# Patient Record
Sex: MALE | Race: WHITE | Employment: FULL TIME | URBAN - METROPOLITAN AREA
[De-identification: names, ages, dates, MRNs, and addresses within clinical notes are randomized per-mention and may not be internally consistent; named-entity substitution may affect disease eponyms.]

---

## 2017-06-27 ENCOUNTER — OFFICE VISIT (OUTPATIENT)
Dept: INTERNAL MEDICINE CLINIC | Age: 52
End: 2017-06-27

## 2017-06-27 VITALS
BODY MASS INDEX: 29.41 KG/M2 | DIASTOLIC BLOOD PRESSURE: 78 MMHG | HEIGHT: 69 IN | OXYGEN SATURATION: 97 % | SYSTOLIC BLOOD PRESSURE: 118 MMHG | TEMPERATURE: 98.2 F | HEART RATE: 69 BPM | RESPIRATION RATE: 16 BRPM | WEIGHT: 198.6 LBS

## 2017-06-27 DIAGNOSIS — R73.01 IFG (IMPAIRED FASTING GLUCOSE): ICD-10-CM

## 2017-06-27 DIAGNOSIS — I10 ESSENTIAL HYPERTENSION: ICD-10-CM

## 2017-06-27 DIAGNOSIS — N18.31 CHRONIC KIDNEY DISEASE (CKD) STAGE G3A/A3, MODERATELY DECREASED GLOMERULAR FILTRATION RATE (GFR) BETWEEN 45-59 ML/MIN/1.73 SQUARE METER AND ALBUMINURIA CREATININE RATIO GREATER THAN 300 MG/G (HCC): Primary | ICD-10-CM

## 2017-06-27 NOTE — PROGRESS NOTES
49 y.o. white male who presents for evaluation. He's here basically asking for referral to nutrition/dietician to learn about renal diet. He's been doing well, continuing on w the wt loss and staying on the exercise program.  No other complaints    Past Medical History:   Diagnosis Date    Arthritis 1/16    djd on t and l spine films YOBANI    Back pain     Bipolar disorder (HCC)     Dr. Stefany Hatch since 1984    Chronic renal insufficiency     Lithium induced, Dr. Ramiro Pickens 2007    Colon adenoma     and diverticulosis 7/15 Dr Abdirizak Aragon    Dyslipidemia     calculated 10 yr risk score was 5.9% (4/14); 5.4% (11/14); 4.3% (5/15); 6.7% (5/16); 7.3% (11/16)    Hypertension     Myofascial pain 2016    Dr Doug Travis Obesity     peak weight 219 lbs, bmi 32.3 from 11/14    OTIS on CPAP     Dr Darrius Renae Proteinuria     minimal 2011    Subacromial bursitis     right Dr. Tolu Claros     Current Outpatient Prescriptions   Medication Sig    OXcarbazepine (TRILEPTAL) 300 mg tablet Take  by mouth.  OLANZapine (ZYPREXA) 10 mg tablet Take 5 mg by mouth nightly.  losartan (COZAAR) 100 mg tablet Take 50 mg by mouth daily.  polyethylene glycol (MIRALAX) 17 gram packet Take 17 g by mouth daily.  multivitamin (ONE A DAY) tablet Take 1 Tab by mouth daily.  Cholecalciferol, Vitamin D3, (VITAMIN D) 2,000 unit Cap Take  by mouth.  lamotrigine (LAMICTAL) 100 mg tablet Take 100 mg by mouth as directed. Pt takes 100mg in the am and 200mg in the pm         No current facility-administered medications for this visit. No Known Allergies    Visit Vitals    /78 (BP 1 Location: Left arm, BP Patient Position: Sitting)    Pulse 69    Temp 98.2 °F (36.8 °C) (Oral)    Resp 16    Ht 5' 9\" (1.753 m)    Wt 198 lb 9.6 oz (90.1 kg)    SpO2 97%    BMI 29.33 kg/m2   no exam was done    Assessment and plan:  1.  Renal.  Will send to nutrition      RTC 11/17 as previously scheduled

## 2017-06-27 NOTE — PROGRESS NOTES
Chief Complaint   Patient presents with    Diet Concern    Other     referral request to nutrition/dietician     Patient stated that he would like to be referred to a dietician regarding kidney disease. 1. Have you been to the ER, urgent care clinic since your last visit? Hospitalized since your last visit? No    2. Have you seen or consulted any other health care providers outside of the [address] [place] [address] since your last visit? Include any pap smears or colon screening.  No

## 2017-06-27 NOTE — MR AVS SNAPSHOT
Visit Information Date & Time Provider Department Dept. Phone Encounter #  
 6/27/2017  8:15 AM Elton Gutierrez MD Internist of [address] [place] [address] [phone] Your Appointments 11/10/2017  8:05 AM  
LAB with [facility] NURSE VISIT Internist of [facility] ([facility]) Appt Note: labs 1yr rd  
 [address] [facility], [address] [facility] [address] [place]  
  
   
 [address], [address]  
  
    
 11/17/2017  8:00 AM  
Office Visit with Elton Gutierrez MD  
[facility] [address] [place] [address] [facility] Appt Note: ov 1year rd  
 [address] [facility], [address] [facility] [address] [place]  
  
   
 [address], [address] Upcoming Health Maintenance Date Due INFLUENZA AGE 9 TO ADULT 8/1/2017 COLONOSCOPY 6/24/2020 DTaP/Tdap/Td series (2 - Td) 11/28/2026 Allergies as of 6/27/2017  Review Complete On: 6/27/2017 By: Smita Romero LPN No Known Allergies Current Immunizations  Reviewed on 12/26/2015 Name Date Influenza Vaccine 9/19/2015, 10/31/2014, 10/30/2013 Tdap 11/28/2016 Not reviewed this visit Vitals BP Pulse Temp Resp Height(growth percentile) Weight(growth percentile) 118/78 (BP 1 Location: Left arm, BP Patient Position: Sitting) 69 98.2 °F (36.8 °C) (Oral) 16 5' 9\" (1.753 m) 198 lb 9.6 oz (90.1 kg) SpO2 BMI Smoking Status 97% 29.33 kg/m2 Never Smoker BMI and BSA Data Body Mass Index Body Surface Area  
 29.33 kg/m 2 2.09 m 2 Preferred Pharmacy Pharmacy Name Phone [facility] [address] [facility], [address] [facility] [phone] Your Updated Medication List  
  
   
This list is accurate as of: 6/27/17  8:33 AM.  Always use your most recent med list.  
  
  
  
  
 LaMICtal 100 mg tablet Generic drug:  lamoTRIgine Take 100 mg by mouth as directed. Pt takes 100mg in the am and 200mg in the pm  
  
 losartan 100 mg tablet Commonly known as:  COZAAR Take 50 mg by mouth daily. MIRALAX 17 gram packet Generic drug:  polyethylene glycol Take 17 g by mouth daily. multivitamin tablet Commonly known as:  ONE A DAY Take 1 Tab by mouth daily. TRILEPTAL 300 mg tablet Generic drug:  OXcarbazepine Take  by mouth. VITAMIN D 2,000 unit Cap capsule Generic drug:  Cholecalciferol (Vitamin D3) Take  by mouth. ZyPREXA 10 mg tablet Generic drug:  OLANZapine Take 5 mg by mouth nightly. Introducing [facility] & HEALTH SERVICES! Dear [facility] Staff: Thank you for requesting a [employer] account. Our records indicate that you already have an active Ob[employer] account. You can access your account anytime at [email or web address] Did you know that you can access your hospital and ER discharge instructions at any time in Obatech? You can also review all of your test results from your hospital stay or ER visit. Additional Information If you have questions, please visit the Frequently Asked Questions section of the [employer] website at [email or web address] Remember, Ob[employer] is NOT to be used for urgent needs. For medical emergencies, dial 911. Now available from your iPhone and Android! Please provide this summary of care documentation to your next provider. Your primary care clinician is listed as Paras Bean. If you have any questions after today's visit, please call [phone].

## 2017-08-10 ENCOUNTER — OFFICE VISIT (OUTPATIENT)
Dept: INTERNAL MEDICINE CLINIC | Age: 52
End: 2017-08-10

## 2017-08-10 VITALS
HEART RATE: 74 BPM | WEIGHT: 200 LBS | RESPIRATION RATE: 14 BRPM | DIASTOLIC BLOOD PRESSURE: 82 MMHG | HEIGHT: 69 IN | OXYGEN SATURATION: 98 % | TEMPERATURE: 98.1 F | SYSTOLIC BLOOD PRESSURE: 122 MMHG | BODY MASS INDEX: 29.62 KG/M2

## 2017-08-10 DIAGNOSIS — N52.9 ERECTILE DYSFUNCTION, UNSPECIFIED ERECTILE DYSFUNCTION TYPE: Primary | ICD-10-CM

## 2017-08-10 RX ORDER — SILDENAFIL 100 MG/1
100 TABLET, FILM COATED ORAL AS NEEDED
Qty: 10 TAB | Refills: 11 | Status: ON HOLD | OUTPATIENT
Start: 2017-08-10 | End: 2021-10-25

## 2017-08-10 NOTE — PROGRESS NOTES
1. Have you been to the ER, urgent care clinic or hospitalized since your last visit? NO.     2. Have you seen or consulted any other health care providers outside of the [facility] since your last visit (Include any pap smears or colon screening)? YES    Kidney specialist.   Stephany Lee     Do you have an Advanced Directive? NO    Would you like information on Advanced Directives?  YES

## 2017-08-10 NOTE — PROGRESS NOTES
47 y.o. white male who presents for evaluation. He has not had sexual relations with anyone for a while until he met someone about a month ago. When they tried to have sex, he had erections which quickly dissipated. No problems when he masturbated prior to meeting her, he has morning erections periodically still. No interpersonal issues w his current partner although he admits to concern regarding her having genital herpes. She apparently takes meds to suppress breakouts. No new meds, stress levels about the same    Past Medical History:   Diagnosis Date    Arthritis 1/16    djd on t and l spine films YOBANI    Back pain     Bipolar disorder (HCC)     Dr. Flex Mcleod since 1984    Chronic renal insufficiency     Lithium induced, Dr. Kyle Nolasco 2007    Colon adenoma     and diverticulosis 7/15 Dr Ray Morin    Dyslipidemia     calculated 10 yr risk score was 5.9% (4/14); 5.4% (11/14); 4.3% (5/15); 6.7% (5/16); 7.3% (11/16)    Erectile dysfunction 8/10/2017    Hypertension     Myofascial pain 2016    Dr Elisha Rubin Obesity     peak weight 219 lbs, bmi 32.3 from 11/14    OITS on CPAP     Dr Tiffanie Starr Proteinuria     minimal 2011    Subacromial bursitis     right Dr. Pratik Swanson     Current Outpatient Prescriptions   Medication Sig    sildenafil citrate (VIAGRA) 100 mg tablet Take 1 Tab by mouth as needed.  OXcarbazepine (TRILEPTAL) 300 mg tablet Take  by mouth.  OLANZapine (ZYPREXA) 10 mg tablet Take 10 mg by mouth nightly.  losartan (COZAAR) 100 mg tablet Take 50 mg by mouth daily.  polyethylene glycol (MIRALAX) 17 gram packet Take 17 g by mouth daily.  multivitamin (ONE A DAY) tablet Take 1 Tab by mouth daily.  Cholecalciferol, Vitamin D3, (VITAMIN D) 2,000 unit Cap Take  by mouth.  lamotrigine (LAMICTAL) 100 mg tablet Take 100 mg by mouth as directed. Pt takes 100mg in the am and 200mg in the pm         No current facility-administered medications for this visit.       No Known Allergies Visit Vitals    /82 (BP 1 Location: Right arm, BP Patient Position: Sitting)    Pulse 74    Temp 98.1 °F (36.7 °C) (Oral)    Resp 14    Ht 5' 9\" (1.753 m)    Wt 200 lb (90.7 kg)    SpO2 98%    BMI 29.53 kg/m2   normal male genitalia, normal cremasteric reflex    Assessment and plan:  1. ED. Long discussion. Trial of viagra after discussing possible sfx, consider cialis if it works and no sfx, we briefly talked about benefits of [facility] pharmacies also. Quick discussion about avoidance measures for hsv exposure      Above conditions discussed at length and patient vocalized understanding.   All questions answered to patient satisfaction

## 2017-08-10 NOTE — MR AVS SNAPSHOT
Visit Information Date & Time Provider Department Dept. Phone Encounter #  
 8/10/2017  8:15 AM Jarrett Ramirez MD Internist of [address] [place] [address] [phone] [id number] Your Appointments 11/10/2017  8:05 AM  
LAB with [facility] NURSE VISIT Internist of [facility] ([facility]) Appt Note: labs 1yr rd  
 [address] [facility], [address] [facility] [address] [place]  
  
   
 [address], [address]  
  
    
 11/17/2017  8:00 AM  
Office Visit with Jarrett Ramirez MD  
Internist of [address] [place] [address] [facility] Appt Note: ov 1year rd  
 [address] [facility], [address] [address] [address] [place]  
  
   
 [address], [address] Upcoming Health Maintenance Date Due INFLUENZA AGE 9 TO ADULT 8/1/2017 COLONOSCOPY 6/24/2020 DTaP/Tdap/Td series (2 - Td) 11/28/2026 Allergies as of 8/10/2017  Review Complete On: 8/10/2017 By: Robert Magana No Known Allergies Current Immunizations  Reviewed on 12/26/2015 Name Date Influenza Vaccine 9/19/2015, 10/31/2014, 10/30/2013 Tdap 11/28/2016 Not reviewed this visit Vitals BP Pulse Temp Resp Height(growth percentile) Weight(growth percentile) 122/82 (BP 1 Location: Right arm, BP Patient Position: Sitting) 74 98.1 °F (36.7 °C) (Oral) 14 5' 9\" (1.753 m) 200 lb (90.7 kg) SpO2 BMI Smoking Status 98% 29.53 kg/m2 Never Smoker Vitals History BMI and BSA Data Body Mass Index Body Surface Area  
 29.53 kg/m 2 2.1 m 2 Preferred Pharmacy Pharmacy Name Phone [facility] [address] [facility], [address] [phone] Your Updated Medication List  
  
   
This list is accurate as of: 8/10/17  9:02 AM.  Always use your most recent med list.  
  
  
  
  
 LaMICtal 100 mg tablet Generic drug:  lamoTRIgine Take 100 mg by mouth as directed. Pt takes 100mg in the am and 200mg in the pm  
  
 losartan 100 mg tablet Commonly known as:  COZAAR Take 50 mg by mouth daily. MIRALAX 17 gram packet Generic drug:  polyethylene glycol Take 17 g by mouth daily. multivitamin tablet Commonly known as:  ONE A DAY Take 1 Tab by mouth daily. TRILEPTAL 300 mg tablet Generic drug:  OXcarbazepine Take  by mouth. VITAMIN D 2,000 unit Cap capsule Generic drug:  Cholecalciferol (Vitamin D3) Take  by mouth. ZyPREXA 10 mg tablet Generic drug:  OLANZapine Take 10 mg by mouth nightly. To-Do List   
 08/30/2017 9:00 AM  
  Appointment with Robel Melgar RD at [facility] Patient Instructions Advance Directives: Care Instructions Your Care Instructions An advance directive is a legal way to state your wishes at the end of your life. It tells your family and your doctor what to do if you can no longer say what you want. There are two main types of advance directives. You can change them any time that your wishes change. · A living will tells your family and your doctor your wishes about life support and other treatment. · A durable power of  for health care lets you name a person to make treatment decisions for you when you can't speak for yourself. This person is called a health care agent. If you do not have an advance directive, decisions about your medical care may be made by a doctor or a  who doesn't know you. It may help to think of an advance directive as a gift to the people who care for you. If you have one, they won't have to make tough decisions by themselves. Follow-up care is a key part of your treatment and safety. Be sure to make and go to all appointments, and call your doctor if you are having problems. It's also a good idea to know your test results and keep a list of the medicines you take. How can you care for yourself at home? · Discuss your wishes with your loved ones and your doctor. This way, there are no surprises. · Many states have a unique form. Or you might use a universal form that has been approved by many states. This kind of form can sometimes be completed and stored online. Your electronic copy will then be available wherever you have a connection to the Internet. In most cases, doctors will respect your wishes even if you have a form from a different state. · You don't need a  to do an advance directive. But you may want to get legal advice. · Think about these questions when you prepare an advance directive: ¨ Who do you want to make decisions about your medical care if you are not able to? Many people choose a family member or close friend. ¨ Do you know enough about life support methods that might be used? If not, talk to your doctor so you understand. ¨ What are you most afraid of that might happen? You might be afraid of having pain, losing your independence, or being kept alive by machines. ¨ Where would you prefer to die? Choices include your home, a hospital, or a nursing home. ¨ Would you like to have information about hospice care to support you and your family? ¨ Do you want to donate organs when you die? ¨ Do you want certain Hoahaoism practices performed before you die? If so, put your wishes in the advance directive. · Read your advance directive every year, and make changes as needed. When should you call for help? Be sure to contact your doctor if you have any questions. Where can you learn more? Go to http://emma-jj.info/. Enter R264 in the search box to learn more about \"Advance Directives: Care Instructions. \" Current as of: November 17, 2016 Content Version: 11.3 © 9016-8475 Spondo.  Care instructions adapted under license by [employer] (which disclaims liability or warranty for this information). If you have questions about a medical condition or this instruction, always ask your healthcare professional. [address] any warranty or liability for your use of this information. Introducing [facility] & HEALTH SERVICES! Dear Sg Gonzalez: Thank you for requesting a [employer] account. Our records indicate that you already have an active [employer] account. You can access your account anytime at [email or web address] Did you know that you can access your hospital and ER discharge instructions at any time in [employer]? You can also review all of your test results from your hospital stay or ER visit. Additional Information If you have questions, please visit the Frequently Asked Questions section of the [employer] website at [email or web address] Remember, [employer] is NOT to be used for urgent needs. For medical emergencies, dial 911. Now available from your iPhone and Android! Please provide this summary of care documentation to your next provider. Your primary care clinician is listed as Sudhakar Nieto. If you have any questions after today's visit, please call [phone].

## 2017-08-10 NOTE — PATIENT INSTRUCTIONS
Advance Directives: Care Instructions  Your Care Instructions  An advance directive is a legal way to state your wishes at the end of your life. It tells your family and your doctor what to do if you can no longer say what you want. There are two main types of advance directives. You can change them any time that your wishes change. · A living will tells your family and your doctor your wishes about life support and other treatment. · A durable power of  for health care lets you name a person to make treatment decisions for you when you can't speak for yourself. This person is called a health care agent. If you do not have an advance directive, decisions about your medical care may be made by a doctor or a  who doesn't know you. It may help to think of an advance directive as a gift to the people who care for you. If you have one, they won't have to make tough decisions by themselves. Follow-up care is a key part of your treatment and safety. Be sure to make and go to all appointments, and call your doctor if you are having problems. It's also a good idea to know your test results and keep a list of the medicines you take. How can you care for yourself at home? · Discuss your wishes with your loved ones and your doctor. This way, there are no surprises. · Many states have a unique form. Or you might use a universal form that has been approved by many states. This kind of form can sometimes be completed and stored online. Your electronic copy will then be available wherever you have a connection to the Internet. In most cases, doctors will respect your wishes even if you have a form from a different state. · You don't need a  to do an advance directive. But you may want to get legal advice. · Think about these questions when you prepare an advance directive:  ¨ Who do you want to make decisions about your medical care if you are not able to?  Many people choose a family member or close friend. ¨ Do you know enough about life support methods that might be used? If not, talk to your doctor so you understand. ¨ What are you most afraid of that might happen? You might be afraid of having pain, losing your independence, or being kept alive by machines. ¨ Where would you prefer to die? Choices include your home, a hospital, or a nursing home. ¨ Would you like to have information about hospice care to support you and your family? ¨ Do you want to donate organs when you die? ¨ Do you want certain Quaker practices performed before you die? If so, put your wishes in the advance directive. · Read your advance directive every year, and make changes as needed. When should you call for help? Be sure to contact your doctor if you have any questions. Where can you learn more? Go to http://emma-jj.info/. Enter R264 in the search box to learn more about \"Advance Directives: Care Instructions. \"  Current as of: November 17, 2016  Content Version: 11.3  © 4170-0478 Healthwise, Incorporated. Care instructions adapted under license by [employer] (which disclaims liability or warranty for this information). If you have questions about a medical condition or this instruction, always ask your healthcare professional. [address] any warranty or liability for your use of this information.

## 2017-09-28 ENCOUNTER — HOSPITAL ENCOUNTER (OUTPATIENT)
Dept: NUTRITION | Age: 52
Discharge: HOME | End: 2017-09-28
Payer: COMMERCIAL

## 2017-09-28 PROCEDURE — 97802 MEDICAL NUTRITION INDIV IN: CPT

## 2017-09-28 NOTE — PROGRESS NOTES
[address] [facility]  [address], [place], [address] - Phone: [phone]     Nutrition Assessment  Medical Nutrition Therapy   Outpatient  Initial Evaluation         Patient Name: Selwyn Vale : 1965   Treatment Diagnosis: Impaired Fasting Glucose; Stage 3B CKD Onset Date:    Referral Source: Ramirez Dixon MD Start of Care [facility]): 2017     Ht:  69 in  cm Wt: 201  lb  kg   BMI: 29.7  BMR male    BMR female      Diagnosis:         Medications of Nutritional Significance:   See med list in chart     Subjective/Assessment: Pt is a 48 yo male who is seeking education for blood sugar control. Pt also has stage 3B CKD. His last A1C was 5.1 Last GFR was 34, Cr: 2.15H, Pt has not been put on any dietary restrictions per doctor or renal disease. Did review high and low potassium foods as well as focuse don lower protein consumption (not exceeding 0.7gm per pound of body wt). Pt works FT as a . He lives alone and eats out /cooks for himself ~50%/50% of the time. He does have a girlfriend which discussed meeting up for outings not envolving food if already eaten dinner. Educated pt on insulin resistance and the rationale for dietary modification and increased activity. Discussed meal timing (follow a timeline), composition, and portion control. Discussed the plate method and how to better balance meals and snacks. Eat 3 meals + 1-2 optional high protein low-no carb snacks each day. (within carbohydrate limits 30-45 gm per meal and 10-15 gm per snack). Discussed the importance of drinking water and Sugar Free beverages only. Complete at least 120 mins of physical activity each week, divided as schedule permits. Pt expressed comprehension, high motivation, and compliance is expected.        Current Eating Patterns:  B: Skips or Donut, coffee w/ splenda  L: lean cuisine, pecan chicken and rice  S: m&Ms or crackers  D: Pasta w/ meat and marinara sauce  Pt drinks 4 bottles of water per day, 3-4 cups coff, non-alcoholis beer, ~1 beer per month, 2-3 diet sodas per day     Handouts/  Information Provided: [x]  Carbohydrates  [x]  Protein  []  Fiber  [x]  Serving Sizes  [x]  Meal and Snack Ideas  [x]  Food Journals [x]  Diabetes  []  Cholesterol  []  Sodium  [x]  Gen Nutr Guidelines  []  SBGM Guidelines  [x]  Others: Paul   Estimate Needs:   Calories: 1800 Protein: 153 Carbs: 162 Fat: 60   Kcal/day  g/day  g/day  g/day         percent: 34 Percent: 36 percent: 30     Nutritional Goal - To promote lifestyle changes to result in:    [x]  weight loss  [x]  Improved diabetic control  []  Decreased cholesterol levels  []  Decreased blood pressure  []  weight maintenance []  Preventing any interactions associated with food allergies  []  Adequate weight gain toward goal weight  []  Other:          Recommendations: 1. Follow consistent and appropriate meal timing; follow a structured timeline  2. Focus on good protein sources; Never eat carbs alone, always pair them with protein,   Carb Limits:   3. Use the plate method for portion contol and balance  4. Exercise for a minimum of 30 min 3-4 times per week, advance to daily.      Information Reviewed with: Patient   Potential Barriers to Learning: None     Dietitian Signature: Suki Burrell MA RD Date: 9/28/2017   Follow-up: 8 NOV @ 8540 Time: 12:43 PM

## 2017-11-10 ENCOUNTER — LAB ONLY (OUTPATIENT)
Dept: INTERNAL MEDICINE CLINIC | Age: 52
End: 2017-11-10

## 2017-11-10 DIAGNOSIS — E78.5 DYSLIPIDEMIA: ICD-10-CM

## 2017-11-10 DIAGNOSIS — I10 ESSENTIAL HYPERTENSION: ICD-10-CM

## 2017-11-10 DIAGNOSIS — R73.01 IFG (IMPAIRED FASTING GLUCOSE): Primary | ICD-10-CM

## 2017-11-15 NOTE — PROGRESS NOTES
47 y.o. white male who presents for RPE    No cardiovascular complaints. He works out doing light toning once a week on average, more motivational than anything else. He did speak with the nutritionist earlier this year and has been trying to stick with the diet. He has largely maintained the wt loss from a year ago     Vitals 11/17/2017 8/10/2017 6/27/2017 11/28/2016 5/26/2016   Weight 209 lb 3.2 oz 200 lb 198 lb 9.6 oz 205 lb 207 lb     No gi or gu complaints. He never ended up using the viagra that we gave, it was costing $75 a pill!     No new psych issues, continues to see Dr Byron Staley sees him yearly    Remains on cpap and doing wel    Past Medical History:   Diagnosis Date    Arthritis 1/16    djd on t and l spine films YOBANI    Back pain     Bipolar disorder ([facility] [address]     Dr. Byron Valero since 1984    Chronic renal insufficiency     Lithium induced, Dr. Esmer Staley 2007    Colon adenoma     and diverticulosis 7/15 Dr Annamaria Cevallos    Dyslipidemia     calculated 10 yr risk score was 5.9% (4/14); 5.4% (11/14); 4.3% (5/15); 6.7% (5/16); 7.3% (11/16)    Erectile dysfunction 8/10/2017    Hypertension     Myofascial pain 2016    Dr Chilango Ferguson    Obesity     peak weight 219 lbs, bmi 32.3 from 11/14    OTIS on CPAP     Dr Rajni Whitehead Proteinuria     minimal 2011    Subacromial bursitis     right Dr. Truong Cordova     Past Surgical History:   Procedure Laterality Date    CARDIAC SURG PROCEDURE UNLIST      ETT negative 2004, 2009    HX COLONOSCOPY      Dr Ananmaria Cevallos 7/15 adenoma and divertics     Social History     Social History    Marital status: UNKNOWN     Spouse name: N/A    Number of children: 1    Years of education: N/A     Occupational History    [address]      Social History Main Topics    Smoking status: Never Smoker    Smokeless tobacco: Never Used    Alcohol use Yes      Comment: social    Drug use: No    Sexual activity: Not on file     Other Topics Concern    Not on file     Social History Narrative     Current Outpatient Prescriptions   Medication Sig    OXcarbazepine (TRILEPTAL) 300 mg tablet Take  by mouth.  OLANZapine (ZYPREXA) 10 mg tablet Take 10 mg by mouth nightly.  losartan (COZAAR) 100 mg tablet Take 50 mg by mouth daily.  polyethylene glycol (MIRALAX) 17 gram packet Take 17 g by mouth daily.  multivitamin (ONE A DAY) tablet Take 1 Tab by mouth daily.  Cholecalciferol, Vitamin D3, (VITAMIN D) 2,000 unit Cap Take  by mouth.  lamotrigine (LAMICTAL) 100 mg tablet Take 100 mg by mouth as directed. Pt takes 100mg in the am and 200mg in the pm        sildenafil citrate (VIAGRA) 100 mg tablet Take 1 Tab by mouth as needed. No current facility-administered medications for this visit. No Known Allergies    REVIEW OF SYSTEMS: colo 2015 Dr Michelle Plata  Ophtho  no vision change or eye pain  Oral  no mouth pain, tongue or tooth problems  Ears  no hearing loss, ear pain, fullness, no swallowing problems  Cardiac  no CP, PND, orthopnea, edema, palpitations or syncope  Chest  no breast masses  Resp  no wheezing, chronic coughing, dyspnea  GI  no heartburn, nausea, vomiting, change in bowel habits, bleeding, hemorrhoids  Urinary  no dysuria, hematuria, flank pain, urgency, frequency  Constitutional  no wt loss, night sweats, unexplained fevers  Neuro  no focal weakness, numbness, paresthesias, incoordination, ataxia, involuntary movements  Endo - no polyuria, polydipsia, nocturia, hot flashes    Visit Vitals    /80 (BP 1 Location: Left arm, BP Patient Position: Sitting)    Pulse 71    Temp 98.1 °F (36.7 °C) (Oral)    Resp 14    Ht 5' 9\" (1.753 m)    Wt 209 lb 3.2 oz (94.9 kg)    SpO2 98%    BMI 30.89 kg/m2   A&O x3  Affect is appropriate. Mood stable  No apparent distress  Anicteric, no JVD, adenopathy or thyromegaly. No carotid bruits or radiated murmur  Lungs clear to auscultation, no wheezes or rales  Heart showed regular rate and rhythm.  No murmur, rubs, gallops  Abdomen soft nontender, no hepatosplenomegaly or masses. Rectal showed guaiac neg brown stool, normal tone  Prostate without asymmetry, nodularity, tenderness  Extremities without edema. Pulses 1-2+ symmetrically    LABS  From 11/09 showed gluc 102, cr 1.80, gfr 44,          chol 215, tg 350, hdl 34, ldl-c 111  From 1/10 showed       alt 30,         chol 200, tg 240, hdl 33, ldl-c 119  From 6/11 showed   gluc 86,   cr 2.30,    alt 17,         chol 198, tg 134, hdl 38, ldl-c 133, wbc 2.8, hb 12.4, plt 190,        psa 0.4  From 12/11 showed gluc 95,   cr 2.01, gfr 39,   ldl-p 1438, chol 201, tg 144, hdl 48, ldl-c 124,            24 hr U pro 474  From 6/12 showed       ldl-p 1611, chol 228, tg 231, hdl 39, ldl-c 143  From 5/14 showed   gluc 96,   cr 1.95, gfr 37, alt 13,         chol 242, tg 450, hdl 32, ldl-c na,   wbc 6.1, hb 13.2, plt 229, ua 1+ pro, tsh 1.83, psa 0.3, vit d 34.7  From 11/14 showed              chol 250, tg 340, hdl 35, ldl-c 147  From 5/15 showed   gluc 97,   cr 2.29, gfr 31,   hba1c 5.7, chol 215, tg 255, hdl 36, ldl-c 128  From 11/15 showed gluc 104, cr 2.14, gfr 35,   hba1c 5.6,            tsh 1.62  From 5/16 showed   gluc 92,   cr 2.11, gfr 34,          chol 252, tg 438, hdl 35, ldl-c na,   wbc 5.2, hb 13.6, plt 233  From 11/16 showed       hba1c 5.1, chol 254, tg 321, hdl 39, ldl-c 151    Labs pending    Patient Active Problem List   Diagnosis Code    Bipolar disorder Dr. Star Sherman F31.9    Dyslipidemia E78.5    Primary hypertension I10    OTIS on CPAP Dr Caroline Moss G47.33, Z99.89    Colon adenoma 7/15 Dr Vania Viramontes D12.6    IFG (impaired fasting glucose) R73.01    Obesity (BMI 30-39. 9) E66.9    Chronic kidney disease (CKD) Lesley Bal Dr Kathrin Schirmer N18.3    Erectile dysfunction N52.9     Assessment and plan:  1. BPD. Continue current and f/u Dr. Russ Isabel  2. CRI and mild proteinuria. Continue cozaar and f/u Dr. Kathrin Schirmer  3. Dyslipidemia.   Previously gave him the [employer] sheet, inc exercise as above  4. HTN. On ARB  5. Obesity. Dietary nad lifestyle measures. Brief discussion on demetrio supp, not interested due to concerns about sfx  6. Prediabetes. As per #5  7. OTIS on cpap. F/U Dr Lali Urbina  8. Colon adenoma. Fiber, colo 2020  9. Flu given, pvx in future        RPE 11/18    Above conditions discussed at length and patient vocalized understanding.   All questions answered to patient satifaction

## 2017-11-17 ENCOUNTER — OFFICE VISIT (OUTPATIENT)
Dept: INTERNAL MEDICINE CLINIC | Age: 52
End: 2017-11-17

## 2017-11-17 VITALS
BODY MASS INDEX: 30.98 KG/M2 | OXYGEN SATURATION: 98 % | SYSTOLIC BLOOD PRESSURE: 108 MMHG | WEIGHT: 209.2 LBS | DIASTOLIC BLOOD PRESSURE: 80 MMHG | HEART RATE: 71 BPM | HEIGHT: 69 IN | TEMPERATURE: 98.1 F | RESPIRATION RATE: 14 BRPM

## 2017-11-17 DIAGNOSIS — G47.33 OSA ON CPAP: ICD-10-CM

## 2017-11-17 DIAGNOSIS — Z00.00 PHYSICAL EXAM: Primary | ICD-10-CM

## 2017-11-17 DIAGNOSIS — E78.5 DYSLIPIDEMIA: ICD-10-CM

## 2017-11-17 DIAGNOSIS — N52.9 ERECTILE DYSFUNCTION, UNSPECIFIED ERECTILE DYSFUNCTION TYPE: ICD-10-CM

## 2017-11-17 DIAGNOSIS — F31.78 BIPOLAR DISORDER, IN FULL REMISSION, MOST RECENT EPISODE MIXED (HCC): ICD-10-CM

## 2017-11-17 DIAGNOSIS — D12.6 COLON ADENOMA: ICD-10-CM

## 2017-11-17 DIAGNOSIS — R73.01 IFG (IMPAIRED FASTING GLUCOSE): ICD-10-CM

## 2017-11-17 DIAGNOSIS — E66.9 OBESITY (BMI 30-39.9): ICD-10-CM

## 2017-11-17 DIAGNOSIS — Z23 ENCOUNTER FOR IMMUNIZATION: ICD-10-CM

## 2017-11-17 DIAGNOSIS — Z99.89 OSA ON CPAP: ICD-10-CM

## 2017-11-17 DIAGNOSIS — N18.31 CHRONIC KIDNEY DISEASE (CKD) STAGE G3A/A3, MODERATELY DECREASED GLOMERULAR FILTRATION RATE (GFR) BETWEEN 45-59 ML/MIN/1.73 SQUARE METER AND ALBUMINURIA CREATININE RATIO GREATER THAN 300 MG/G (HCC): ICD-10-CM

## 2017-11-17 DIAGNOSIS — I10 PRIMARY HYPERTENSION: ICD-10-CM

## 2017-11-17 NOTE — PROGRESS NOTES
1. Have you been to the ER, urgent care clinic or hospitalized since your last visit? NO.     2. Have you seen or consulted any other health care providers outside of the [facility] since your last visit (Include any pap smears or colon screening)? NO      Do you have an Advanced Directive? NO    Would you like information on Advanced Directives?  NO

## 2017-11-17 NOTE — PROGRESS NOTES
VO from Dr. Jerez Memory for Regular Influenza. Regular Influenza given in Left Deltoid. No pain or reactions noted at injection site.

## 2017-11-17 NOTE — MR AVS SNAPSHOT
Visit Information Date & Time Provider Department Dept. Phone Encounter #  
 11/17/2017  8:00 AM Blanco Mas MD [facility]  Follow-up Instructions Return in about 1 year (around 11/17/2018). Your Appointments 11/6/2018  7:55 AM  
LAB with [facility] NURSE VISIT [facility] ([address]) Appt Note: labs 1yr rd  
 [address] [facility], [address] [place] [address] [facility] [address] [place]  
  
   
 [address], [facility]  
  
    
 11/20/2018  8:20 AM  
PHYSICAL with Blanco Mas MD  
[facility] [address] [facility]) Appt Note: ov 1yr rd; ov rpe 1yr  rd  
 [address] [facility], [address] [facility] [address] [place]  
  
   
 [address], [facility] Upcoming Health Maintenance Date Due Influenza Age 5 to Adult 8/1/2017 COLONOSCOPY 6/24/2020 DTaP/Tdap/Td series (2 - Td) 11/28/2026 Allergies as of 11/17/2017  Review Complete On: 11/17/2017 By: Blanco Mas MD  
 No Known Allergies Current Immunizations  Reviewed on 11/17/2017 Name Date Influenza Vaccine 9/19/2015, 10/31/2014, 10/30/2013 Influenza Vaccine (Quad) PF 11/17/2017  8:44 AM  
 Tdap 11/28/2016 Reviewed by Blanco Mas MD on 11/17/2017 at  8:46 AM  
You Were Diagnosed With   
  
 Codes Comments Physical exam    -  Primary ICD-10-CM: Z00.00 ICD-9-CM: V70.9 Bipolar disorder, in full remission, most recent episode mixed [facility])     ICD-10-CM: F31.78 
ICD-9-CM: 296.66 Dyslipidemia     ICD-10-CM: E78.5 ICD-9-CM: 272.4 Primary hypertension     ICD-10-CM: I10 
ICD-9-CM: 401.9 OTIS on CPAP     ICD-10-CM: G47.33, Z99.89 ICD-9-CM: 327.23, V46.8 Colon adenoma     ICD-10-CM: D12.6 ICD-9-CM: 211.3 IFG (impaired fasting glucose)     ICD-10-CM: R73.01 
ICD-9-CM: 790.21 Obesity (BMI 30-39. 9)     ICD-10-CM: E66.9 ICD-9-CM: 278.00 Chronic kidney disease (CKD) stage G3a/A3, moderately decreased glomerular filtration rate (GFR) between 45-59 mL/min/1.73 square meter and albuminuria creatinine ratio greater than 300 mg/g     ICD-10-CM: N18.3 ICD-9-CM: 278. 3 Erectile dysfunction, unspecified erectile dysfunction type     ICD-10-CM: N52.9 ICD-9-CM: 607.84 Encounter for immunization     ICD-10-CM: L09 ICD-9-CM: V03.89 Vitals BP Pulse Temp Resp Height(growth percentile) Weight(growth percentile) 108/80 (BP 1 Location: Left arm, BP Patient Position: Sitting) 71 98.1 °F (36.7 °C) (Oral) 14 5' 9\" (1.753 m) 209 lb 3.2 oz (94.9 kg) SpO2 BMI Smoking Status 98% 30.89 kg/m2 Never Smoker Vitals History BMI and BSA Data Body Mass Index Body Surface Area  
 30.89 kg/m 2 2.15 m 2 Preferred Pharmacy Pharmacy Name Phone [facility], [address] [place] [address] [phone] Your Updated Medication List  
  
   
This list is accurate as of: 11/17/17  8:51 AM.  Always use your most recent med list.  
  
  
  
  
 LaMICtal 100 mg tablet Generic drug:  lamoTRIgine Take 100 mg by mouth as directed. Pt takes 100mg in the am and 200mg in the pm  
  
 losartan 100 mg tablet Commonly known as:  COZAAR Take 50 mg by mouth daily. MIRALAX 17 gram packet Generic drug:  polyethylene glycol Take 17 g by mouth daily. multivitamin tablet Commonly known as:  ONE A DAY Take 1 Tab by mouth daily. sildenafil citrate 100 mg tablet Commonly known as:  VIAGRA Take 1 Tab by mouth as needed. TRILEPTAL 300 mg tablet Generic drug:  OXcarbazepine Take  by mouth. VITAMIN D 2,000 unit Cap capsule Generic drug:  Cholecalciferol (Vitamin D3) Take  by mouth. ZyPREXA 10 mg tablet Generic drug:  OLANZapine Take 10 mg by mouth nightly. We Performed the Following INFLUENZA VIRUS VAC QUAD,SPLIT,PRESV FREE SYRINGE IM [id number] CPT(R)] Follow-up Instructions Return in about 1 year (around 11/17/2018). To-Do List   
 05/17/2018 Lab:  CBC W/O DIFF   
  
 05/17/2018 Lab:  LIPID PANEL   
  
 05/17/2018 Lab:  METABOLIC PANEL, COMPREHENSIVE   
  
 05/17/2018 Lab:  PSA, DIAGNOSTIC (PROSTATE SPECIFIC AG)   
  
 05/19/2018 Lab:  HEMOGLOBIN A1C W/O EAG Introducing \[facility] [place] [facility]\"" & [facility]! Dear Kwan Alexander: Thank you for requesting a Globial account. Our records indicate that you already have an active Globial account. You can access your account anytime at [email or web address] Did you know that you can access your hospital and ER discharge instructions at any time in Globial? You can also review all of your test results from your hospital stay or ER visit. Additional Information If you have questions, please visit the Frequently Asked Questions section of the Globial website at [email or web address] Remember, Globial is NOT to be used for urgent needs. For medical emergencies, dial 911. Now available from your iPhone and Android! Please provide this summary of care documentation to your next provider. Your primary care clinician is listed as Drinda Epley. If you have any questions after today's visit, please call [phone].

## 2017-11-21 ENCOUNTER — TELEPHONE (OUTPATIENT)
Dept: INTERNAL MEDICINE CLINIC | Age: 52
End: 2017-11-21

## 2017-11-21 NOTE — TELEPHONE ENCOUNTER
Called and spoke with patient and given all results below. Medical records, please mail patient a copy of his recent labs. He has [employer] and it doesn't look like they have been scanned in yet, but Dr. Kristel Almazan had to have them in order to send this message.

## 2017-11-21 NOTE — PROGRESS NOTES
Addendum:    From 11/17 showed gluc 90, cr 2.31, gfr 32, alt 24, chol 278, tg 342, hdl 38, ldl-c 182, wbc 6.3, hb 13.6, plt 253      Chol MUCH worse - diet/exercise  Glu ok  Cr stable, cmp and cbc normal    pls call results

## 2017-11-21 NOTE — TELEPHONE ENCOUNTER
Blanca Moore MD at 11/17/17 0800        Status: Signed            Addendum:     From 11/17 showed gluc 90, cr 2.31, gfr 32, alt 24, chol 278, tg 342, hdl 38, ldl-c 182, wbc 6.3, hb 13.6, plt 253        Chol MUCH worse - diet/exercise  Glu ok  Cr stable, cmp and cbc normal     pls call results

## 2018-11-06 ENCOUNTER — APPOINTMENT (OUTPATIENT)
Dept: INTERNAL MEDICINE CLINIC | Age: 53
End: 2018-11-06

## 2018-11-06 ENCOUNTER — HOSPITAL ENCOUNTER (OUTPATIENT)
Dept: LAB | Age: 53
Discharge: HOME | End: 2018-11-06
Payer: COMMERCIAL

## 2018-11-06 DIAGNOSIS — Z00.00 PHYSICAL EXAM: ICD-10-CM

## 2018-11-06 LAB
ALBUMIN SERPL-MCNC: 3.8 G/DL (ref 3.4–5)
ALBUMIN/GLOB SERPL: 1.4 {RATIO} (ref 0.8–1.7)
ALP SERPL-CCNC: 130 U/L (ref 45–117)
ALT SERPL-CCNC: 36 U/L (ref 16–61)
ANION GAP SERPL CALC-SCNC: 7 MMOL/L (ref 3–18)
AST SERPL-CCNC: 18 U/L (ref 15–37)
BILIRUB SERPL-MCNC: 0.4 MG/DL (ref 0.2–1)
BUN SERPL-MCNC: 28 MG/DL (ref 7–18)
BUN/CREAT SERPL: 11 (ref 12–20)
CALCIUM SERPL-MCNC: 8.9 MG/DL (ref 8.5–10.1)
CHLORIDE SERPL-SCNC: 109 MMOL/L (ref 100–108)
CHOLEST SERPL-MCNC: 250 MG/DL
CO2 SERPL-SCNC: 28 MMOL/L (ref 21–32)
CREAT SERPL-MCNC: 2.44 MG/DL (ref 0.6–1.3)
ERYTHROCYTE [DISTWIDTH] IN BLOOD BY AUTOMATED COUNT: 14.2 % (ref 11.6–14.5)
GLOBULIN SER CALC-MCNC: 2.8 G/DL (ref 2–4)
GLUCOSE SERPL-MCNC: 102 MG/DL (ref 74–99)
HCT VFR BLD AUTO: 41 % (ref 36–48)
HDLC SERPL-MCNC: 37 MG/DL (ref 40–60)
HDLC SERPL: 6.8 {RATIO} (ref 0–5)
HGB BLD-MCNC: 12.7 G/DL (ref 13–16)
LDLC SERPL CALC-MCNC: ABNORMAL MG/DL (ref 0–100)
LIPID PROFILE,FLP: ABNORMAL
MCH RBC QN AUTO: 27.4 PG (ref 24–34)
MCHC RBC AUTO-ENTMCNC: 31 G/DL (ref 31–37)
MCV RBC AUTO: 88.6 FL (ref 74–97)
PLATELET # BLD AUTO: 268 K/UL (ref 135–420)
PMV BLD AUTO: 10.2 FL (ref 9.2–11.8)
POTASSIUM SERPL-SCNC: 4.7 MMOL/L (ref 3.5–5.5)
PROT SERPL-MCNC: 6.6 G/DL (ref 6.4–8.2)
PSA SERPL-MCNC: 0.4 NG/ML (ref 0–4)
RBC # BLD AUTO: 4.63 M/UL (ref 4.7–5.5)
SODIUM SERPL-SCNC: 144 MMOL/L (ref 136–145)
TRIGL SERPL-MCNC: 436 MG/DL (ref ?–150)
VLDLC SERPL CALC-MCNC: ABNORMAL MG/DL
WBC # BLD AUTO: 5.9 K/UL (ref 4.6–13.2)

## 2018-11-06 PROCEDURE — 85027 COMPLETE CBC AUTOMATED: CPT | Performed by: INTERNAL MEDICINE

## 2018-11-06 PROCEDURE — 84153 ASSAY OF PSA TOTAL: CPT | Performed by: INTERNAL MEDICINE

## 2018-11-06 PROCEDURE — 80053 COMPREHEN METABOLIC PANEL: CPT | Performed by: INTERNAL MEDICINE

## 2018-11-06 PROCEDURE — 36415 COLL VENOUS BLD VENIPUNCTURE: CPT | Performed by: INTERNAL MEDICINE

## 2018-11-06 PROCEDURE — 80061 LIPID PANEL: CPT | Performed by: INTERNAL MEDICINE

## 2018-11-15 NOTE — PROGRESS NOTES
47 y.o. white male who presents for RPE No cardiovascular complaints. He works out at home doing some [employer] sporadically, not much cardio to speak of outside of biking in the summer. He is trying to stick with the precepts given to him by the dietician a couple years back but not much wt loss as noted below. Vitals 11/20/2018 11/17/2017 8/10/2017 6/27/2017 11/28/2016 Weight 222 lb 209 lb 3.2 oz 200 lb 198 lb 9.6 oz 205 lb No gi or gu complaints. Specifically no bleeding anywhere No new psych issues, continues changed to Dr Lee Stager and no new meds. Dr. Robel Hogan sees him yearly Remains on cpap and doing well Past Medical History:  
Diagnosis Date  Arthritis 1/16  
 djd on t and l spine films YOBANI  Back pain  Bipolar disorder ([facility] [address] Dr. Linsey Baker since 1984  Chronic renal insufficiency Lithium induced, Dr. Robel Hogan 2007  Colon adenoma   
 and diverticulosis 7/15 Dr Bushra Hicks  Dyslipidemia   
 calculated 10 yr risk score was 5.9% (4/14); 5.4% (11/14); 4.3% (5/15); 6.7% (5/16); 7.3% (11/16)  Erectile dysfunction 8/10/2017  Hypertension  Myofascial pain 2016 Dr Valarie Morton  Obesity IF 11/18 start weight 222 lbs  OTIS on CPAP Dr Margo Hoover Proteinuria   
 minimal 2011  Subacromial bursitis   
 right Dr. Noble Sheffield Past Surgical History:  
Procedure Laterality Date  CARDIAC SURG PROCEDURE UNLIST    
 ETT negative 2004, 2009  HX COLONOSCOPY Dr Bushra Hicks 7/15 adenoma and divertics Social History Socioeconomic History  Marital status:  Spouse name: Not on file  Number of children: 1  Years of education: Not on file  Highest education level: Not on file Social Needs  Financial resource strain: Not on file  Food insecurity - worry: Not on file  Food insecurity - inability: Not on file  Transportation needs - medical: Not on file  Transportation needs - non-medical: Not on file Occupational History  Occupation: [address]  
Tobacco Use  Smoking status: Never Smoker  Smokeless tobacco: Never Used Substance and Sexual Activity  Alcohol use: Yes Comment: social  
 Drug use: No  
 Sexual activity: Not on file Other Topics Concern  Not on file Social History Narrative  Not on file Current Outpatient Medications Medication Sig  
 OXcarbazepine (TRILEPTAL) 300 mg tablet Take  by mouth.  OLANZapine (ZYPREXA) 10 mg tablet Take 10 mg by mouth nightly.  losartan (COZAAR) 100 mg tablet Take 50 mg by mouth daily.  polyethylene glycol (MIRALAX) 17 gram packet Take 17 g by mouth daily.  multivitamin (ONE A DAY) tablet Take 1 Tab by mouth daily.  Cholecalciferol, Vitamin D3, (VITAMIN D) 2,000 unit Cap Take  by mouth.  lamoTRIgine (LAMICTAL) 100 mg tablet Take 100 mg by mouth as directed. Pt takes 100mg in the am and 200mg in the pm 
 
  
 sildenafil citrate (VIAGRA) 100 mg tablet Take 1 Tab by mouth as needed. (Patient taking differently: Take 100 mg by mouth as needed.) No current facility-administered medications for this visit. No Known Allergies REVIEW OF SYSTEMS: colo 2015 Dr Chhaya Riley Ophtho  no vision change or eye pain Oral  no mouth pain, tongue or tooth problems Ears  no hearing loss, ear pain, fullness, no swallowing problems Cardiac  no CP, PND, orthopnea, edema, palpitations or syncope Chest  no breast masses Resp  no wheezing, chronic coughing, dyspnea GI  no heartburn, nausea, vomiting, change in bowel habits, bleeding, hemorrhoids Urinary  no dysuria, hematuria, flank pain, urgency, frequency Constitutional  no wt loss, night sweats, unexplained fevers Neuro  no focal weakness, numbness, paresthesias, incoordination, ataxia, involuntary movements Endo - no polyuria, polydipsia, nocturia, hot flashes Visit Vitals /88 Pulse 71 Temp 98.8 °F (37.1 °C) (Oral) Resp 14 Ht 5' 9\" (1.753 m) Wt 222 lb (100.7 kg) SpO2 95% BMI 32.78 kg/m² A&O x3 Affect is appropriate. Mood stable No apparent distress Anicteric, no JVD, adenopathy or thyromegaly. No carotid bruits or radiated murmur Lungs clear to auscultation, no wheezes or rales Heart showed regular rate and rhythm. No murmur, rubs, gallops Abdomen soft nontender, no hepatosplenomegaly or masses. Rectal showed guaiac neg brown stool, normal tone Prostate without asymmetry, nodularity, tenderness Extremities without edema. Pulses 1-2+ symmetrically LABS From 11/09 showed gluc 102, cr 1.80, gfr 44,          chol 215, tg 350, hdl 34, ldl-c 111 From 1/10 showed       alt 30,         chol 200, tg 240, hdl 33, ldl-c 119 From 6/11 showed   gluc 86,   cr 2.30,    alt 17,         chol 198, tg 134, hdl 38, ldl-c 133, wbc 2.8, hb 12.4, plt 190,        psa 0.4 From 12/11 showed gluc 95,   cr 2.01, gfr 39,   ldl-p 1438, chol 201, tg 144, hdl 48, ldl-c 124,            24 hr U pro 474 From 6/12 showed       ldl-p 1611, chol 228, tg 231, hdl 39, ldl-c 143 From 5/14 showed   gluc 96,   cr 1.95, gfr 37, alt 13,         chol 242, tg 450, hdl 32, ldl-c na,   wbc 6.1, hb 13.2, plt 229, ua 1+ pro, tsh 1.83, psa 0.3, vit d 34.7 From 11/14 showed              chol 250, tg 340, hdl 35, ldl-c 147 From 5/15 showed   gluc 97,   cr 2.29, gfr 31,   hba1c 5.7, chol 215, tg 255, hdl 36, ldl-c 128 From 11/15 showed gluc 104, cr 2.14, gfr 35,   hba1c 5.6,            tsh 1.62 From 5/16 showed   gluc 92,   cr 2.11, gfr 34,          chol 252, tg 438, hdl 35, ldl-c na,   wbc 5.2, hb 13.6, plt 233 From 11/16 showed       hba1c 5.1, chol 254, tg 321, hdl 39, ldl-c 151 From 11/17 showed gluc 90,   cr 2.31, gfr 32, alt 24,         chol 278, tg 342, hdl 38, ldl-c 182, wbc 6.3, hb 13.6, plt 253 Results for orders placed or performed during the hospital encounter of 11/06/18 CBC W/O DIFF Result Value Ref Range WBC 5.9 4.6 - 13.2 K/uL  
 RBC 4.63 (L) 4.70 - 5.50 M/uL  
 HGB 12.7 (L) 13.0 - 16.0 g/dL HCT 41.0 36.0 - 48.0 % MCV 88.6 74.0 - 97.0 FL  
 MCH 27.4 24.0 - 34.0 PG  
 MCHC 31.0 31.0 - 37.0 g/dL  
 RDW 14.2 11.6 - 14.5 % PLATELET 168 319 - 010 K/uL MPV 10.2 9.2 - 11.8 FL  
LIPID PANEL Result Value Ref Range LIPID PROFILE Cholesterol, total 250 (H) <200 MG/DL Triglyceride 436 (H) <150 MG/DL  
 HDL Cholesterol 37 (L) 40 - 60 MG/DL  
 LDL, calculated  0 - 100 MG/DL LDL AND VLDL CHOLESTEROL NOT CALCULATED WHEN TRIGLYCERIDES >400 MG/DL OR HDL CHOLESTEROL <20 MG/DL VLDL, calculated  MG/DL Calculation not valid with this patient's other Lipid values. CHOL/HDL Ratio 6.8 (H) 0 - 5.0 METABOLIC PANEL, COMPREHENSIVE Result Value Ref Range Sodium 144 136 - 145 mmol/L Potassium 4.7 3.5 - 5.5 mmol/L Chloride 109 (H) 100 - 108 mmol/L  
 CO2 28 21 - 32 mmol/L Anion gap 7 3.0 - 18 mmol/L Glucose 102 (H) 74 - 99 mg/dL BUN 28 (H) 7.0 - 18 MG/DL Creatinine 2.44 (H) 0.6 - 1.3 MG/DL  
 BUN/Creatinine ratio 11 (L) 12 - 20 GFR est AA 34 (L) >60 ml/min/1.73m2 GFR est non-AA 28 (L) >60 ml/min/1.73m2 Calcium 8.9 8.5 - 10.1 MG/DL Bilirubin, total 0.4 0.2 - 1.0 MG/DL  
 ALT (SGPT) 36 16 - 61 U/L  
 AST (SGOT) 18 15 - 37 U/L Alk. phosphatase 130 (H) 45 - 117 U/L Protein, total 6.6 6.4 - 8.2 g/dL Albumin 3.8 3.4 - 5.0 g/dL Globulin 2.8 2.0 - 4.0 g/dL A-G Ratio 1.4 0.8 - 1.7 PSA, DIAGNOSTIC (PROSTATE SPECIFIC AG) Result Value Ref Range Prostate Specific Ag 0.4 0.0 - 4.0 ng/mL Patient Active Problem List  
Diagnosis Code  Bipolar disorder Dr. Levi Marie F31.9  Dyslipidemia E78.5  Primary hypertension I10  
 OTIS on CPAP Dr Don Ormond G47.33, Z99.89  
 Colon adenoma 7/15 Dr Miko Marroquin D12.6  
 IFG (impaired fasting glucose) R73.01  
 Obesity (BMI 30-39. 9) E66.9  Chronic kidney disease (CKD) stage G3a/A3 Dr Flower Chauhan N18.3  Erectile dysfunction N52.9 Assessment and plan: 1. BPD. Continue current and f/u Dr. Richard Guerin 2. CRI and mild proteinuria. Continue cozaar and f/u Dr. Jose Antonio Blue 3. Dyslipidemia. Previously gave him the [facility] diet sheet, inc exercise as above 4. HTN. On ARB 5. Obesity. Intermittent fasting discussed at length. Explained the concepts in detail. Went over possible physiologic changes that could occur and how that would possibly impact his situation in a positive way. Discussed 16:8 program in particular. We also went over the differences between hunger and manuela hypoglycemia. Look up low insulin index foods. He will do some more research and consider implementing in the near future, standard handout given 6. Prediabetes. As per #5 7. OTIS on cpap. F/U Dr Gabriela Cole 
8. Colon adenoma. Fiber, colo 2020 9. Anemia. Recheck in 3 mos and proceed with eval if persistent RPE 11/18 Above conditions discussed at length and patient vocalized understanding. All questions answered to patient satisfaction

## 2018-11-19 NOTE — PROGRESS NOTES
1. Have you been to the ER, urgent care clinic or hospitalized since your last visit? NO.  
 
2. Have you seen or consulted any other health care providers outside of the [address] [place] [address] since your last visit (Include any pap smears or colon screening)? NO Do you have an Advanced Directive? NO Would you like information on Advanced Directives? YES Chief Complaint Patient presents with  Physical  
  1 year follow up with labs Brijesh Brenner is a 47 y.o. male who presents for routine immunizations. Per verbal order from [address]. Influenza given in left deltoid. He denies any symptoms , reactions or allergies that would exclude them from being immunized today. Risks and adverse reactions were discussed and the VIS was given to them. All questions were addressed. He was observed for 15 min post injection. There were no reactions observed.  
 
Vikki Jones LPN

## 2018-11-20 ENCOUNTER — OFFICE VISIT (OUTPATIENT)
Dept: INTERNAL MEDICINE CLINIC | Age: 53
End: 2018-11-20

## 2018-11-20 VITALS
WEIGHT: 222 LBS | RESPIRATION RATE: 14 BRPM | TEMPERATURE: 98.8 F | HEIGHT: 69 IN | HEART RATE: 71 BPM | DIASTOLIC BLOOD PRESSURE: 88 MMHG | BODY MASS INDEX: 32.88 KG/M2 | OXYGEN SATURATION: 95 % | SYSTOLIC BLOOD PRESSURE: 118 MMHG

## 2018-11-20 DIAGNOSIS — N18.31 CHRONIC KIDNEY DISEASE (CKD) STAGE G3A/A3, MODERATELY DECREASED GLOMERULAR FILTRATION RATE (GFR) BETWEEN 45-59 ML/MIN/1.73 SQUARE METER AND ALBUMINURIA CREATININE RATIO GREATER THAN 300 MG/G (HCC): ICD-10-CM

## 2018-11-20 DIAGNOSIS — E78.5 DYSLIPIDEMIA: ICD-10-CM

## 2018-11-20 DIAGNOSIS — E66.9 OBESITY (BMI 30-39.9): ICD-10-CM

## 2018-11-20 DIAGNOSIS — Z00.00 PHYSICAL EXAM: Primary | ICD-10-CM

## 2018-11-20 DIAGNOSIS — F31.78 BIPOLAR DISORDER, IN FULL REMISSION, MOST RECENT EPISODE MIXED (HCC): ICD-10-CM

## 2018-11-20 DIAGNOSIS — G47.33 OSA ON CPAP: ICD-10-CM

## 2018-11-20 DIAGNOSIS — D64.9 ANEMIA, UNSPECIFIED TYPE: ICD-10-CM

## 2018-11-20 DIAGNOSIS — Z99.89 OSA ON CPAP: ICD-10-CM

## 2018-11-20 DIAGNOSIS — Z23 ENCOUNTER FOR IMMUNIZATION: ICD-10-CM

## 2018-11-20 DIAGNOSIS — R73.01 IFG (IMPAIRED FASTING GLUCOSE): ICD-10-CM

## 2018-11-20 DIAGNOSIS — I10 PRIMARY HYPERTENSION: ICD-10-CM

## 2018-11-20 DIAGNOSIS — D12.6 COLON ADENOMA: ICD-10-CM

## 2018-11-20 NOTE — PATIENT INSTRUCTIONS
Advance Directives: Care Instructions Your Care Instructions An advance directive is a legal way to state your wishes at the end of your life. It tells your family and your doctor what to do if you can no longer say what you want. There are two main types of advance directives. You can change them any time that your wishes change. · A living will tells your family and your doctor your wishes about life support and other treatment. · A durable power of  for health care lets you name a person to make treatment decisions for you when you can't speak for yourself. This person is called a health care agent. If you do not have an advance directive, decisions about your medical care may be made by a doctor or a  who doesn't know you. It may help to think of an advance directive as a gift to the people who care for you. If you have one, they won't have to make tough decisions by themselves. Follow-up care is a key part of your treatment and safety. Be sure to make and go to all appointments, and call your doctor if you are having problems. It's also a good idea to know your test results and keep a list of the medicines you take. How can you care for yourself at home? · Discuss your wishes with your loved ones and your doctor. This way, there are no surprises. · Many states have a unique form. Or you might use a universal form that has been approved by many states. This kind of form can sometimes be completed and stored online. Your electronic copy will then be available wherever you have a connection to the Internet. In most cases, doctors will respect your wishes even if you have a form from a different state. · You don't need a  to do an advance directive. But you may want to get legal advice. · Think about these questions when you prepare an advance directive: 
?  Who do you want to make decisions about your medical care if you are not able to? Many people choose a family member or close friend. ? Do you know enough about life support methods that might be used? If not, talk to your doctor so you understand. ? What are you most afraid of that might happen? You might be afraid of having pain, losing your independence, or being kept alive by machines. ? Where would you prefer to die? Choices include your home, a hospital, or a nursing home. ? Would you like to have information about hospice care to support you and your family? ? Do you want to donate organs when you die? ? Do you want certain Faith practices performed before you die? If so, put your wishes in the advance directive. · Read your advance directive every year, and make changes as needed. When should you call for help? Be sure to contact your doctor if you have any questions. Where can you learn more? Go to http://emmaFLENSjj.info/. Enter R264 in the search box to learn more about \"Advance Directives: Care Instructions. \" Current as of: April 19, 2018 Content Version: 11.8 © 1249-2481 [email or web address]. Care instructions adapted under license by [employer] (which disclaims liability or warranty for this information). If you have questions about a medical condition or this instruction, always ask your healthcare professional. [address] any warranty or liability for your use of this information. Vaccine Information Statement Influenza (Flu) Vaccine (Inactivated or Recombinant): What you need to know Many Vaccine Information Statements are available in Tamazight and other languages. See www.immunize.org/vis Hojas de Información Sobre Vacunas están disponibles en Español y en muchos otros idiomas. Visite www.immunize.org/vis 1. Why get vaccinated? Influenza (flu) is a contagious disease that spreads around the [place] every year, usually between October and May. Flu is caused by influenza viruses, and is spread mainly by coughing, sneezing, and close contact. Anyone can get flu. Flu strikes suddenly and can last several days. Symptoms vary by age, but can include: 
 fever/chills  sore throat  muscle aches  fatigue  cough  headache  runny or stuffy nose Flu can also lead to pneumonia and blood infections, and cause diarrhea and seizures in children. If you have a medical condition, such as heart or lung disease, flu can make it worse. Flu is more dangerous for some people. Infants and young children, people 72years of age and older, pregnant women, and people with certain health conditions or a weakened immune system are at greatest risk. Each year thousands of people in the [facility] die from flu, and many more are hospitalized. Flu vaccine can: 
 keep you from getting flu, 
 make flu less severe if you do get it, and 
 keep you from spreading flu to your family and other people. 2. Inactivated and recombinant flu vaccines A dose of flu vaccine is recommended every flu season. Children 6 months through 6years of age may need two doses during the same flu season. Everyone else needs only one dose each flu season. Some inactivated flu vaccines contain a very small amount of a mercury-based preservative called thimerosal. Studies have not shown thimerosal in vaccines to be harmful, but flu vaccines that do not contain thimerosal are available. There is no live flu virus in flu shots. They cannot cause the flu. There are many flu viruses, and they are always changing. Each year a new flu vaccine is made to protect against three or four viruses that are likely to cause disease in the upcoming flu season. But even when the vaccine doesnt exactly match these viruses, it may still provide some protection Flu vaccine cannot prevent: 
 flu that is caused by a virus not covered by the vaccine, or 
  illnesses that look like flu but are not. It takes about 2 weeks for protection to develop after vaccination, and protection lasts through the flu season. 3. Some people should not get this vaccine Tell the person who is giving you the vaccine:  If you have any severe, life-threatening allergies. If you ever had a life-threatening allergic reaction after a dose of flu vaccine, or have a severe allergy to any part of this vaccine, you may be advised not to get vaccinated. Most, but not all, types of flu vaccine contain a small amount of egg protein.  If you ever had Guillain-Barré Syndrome (also called GBS). Some people with a history of GBS should not get this vaccine. This should be discussed with your doctor.  If you are not feeling well. It is usually okay to get flu vaccine when you have a mild illness, but you might be asked to come back when you feel better. 4. Risks of a vaccine reaction With any medicine, including vaccines, there is a chance of reactions. These are usually mild and go away on their own, but serious reactions are also possible. Most people who get a flu shot do not have any problems with it. Minor problems following a flu shot include:  
 soreness, redness, or swelling where the shot was given  hoarseness  sore, red or itchy eyes  cough  fever  aches  headache  itching  fatigue If these problems occur, they usually begin soon after the shot and last 1 or 2 days. More serious problems following a flu shot can include the following:  There may be a small increased risk of Guillain-Barré Syndrome (GBS) after inactivated flu vaccine. This risk has been estimated at 1 or 2 additional cases per million people vaccinated. This is much lower than the risk of severe complications from flu, which can be prevented by flu vaccine.    
 
 Young children who get the flu shot along with pneumococcal vaccine (PCV13) and/or DTaP vaccine at the same time might be slightly more likely to have a seizure caused by fever. Ask your doctor for more information. Tell your doctor if a child who is getting flu vaccine has ever had a seizure. Problems that could happen after any injected vaccine:  People sometimes faint after a medical procedure, including vaccination. Sitting or lying down for about 15 minutes can help prevent fainting, and injuries caused by a fall. Tell your doctor if you feel dizzy, or have vision changes or ringing in the ears.  Some people get severe pain in the shoulder and have difficulty moving the arm where a shot was given. This happens very rarely.  Any medication can cause a severe allergic reaction. Such reactions from a vaccine are very rare, estimated at about 1 in a million doses, and would happen within a few minutes to a few hours after the vaccination. As with any medicine, there is a very remote chance of a vaccine causing a serious injury or death. The safety of vaccines is always being monitored. For more information, visit: www.cdc.gov/vaccinesafety/ 
 
 
The [facility] Vaccine Injury Compensation Program (VICP) is a federal program that was created to compensate people who may have been injured by certain vaccines. Persons who believe they may have been injured by a vaccine can learn about the program and about filing a claim by calling 9-782.974.2027 or visiting the [employer] website at www.[email or web address].gov/vaccinecompensation. There is a time limit to file a claim for compensation. 7. How can I learn more?  Ask your healthcare provider. He or she can give you the vaccine package insert or suggest other sources of information.  Call your local or state health department.  Contact the Centers for Disease Control and Prevention (CDC): 
- Call 3-402.100.7085 (1-800-CDC-INFO) or 
- Visit CDCs website at www.cdc.gov/flu Vaccine Information Statement Inactivated Influenza Vaccine 8/7/2015 
42 GISEL Patton [id number] [facility] and [employer] Centers for Disease Control and Prevention Office Use Only

## 2019-02-19 ENCOUNTER — OFFICE VISIT (OUTPATIENT)
Dept: INTERNAL MEDICINE CLINIC | Age: 54
End: 2019-02-19

## 2019-02-19 VITALS
HEIGHT: 69 IN | DIASTOLIC BLOOD PRESSURE: 86 MMHG | TEMPERATURE: 97.8 F | OXYGEN SATURATION: 98 % | WEIGHT: 214 LBS | SYSTOLIC BLOOD PRESSURE: 116 MMHG | RESPIRATION RATE: 14 BRPM | HEART RATE: 61 BPM | BODY MASS INDEX: 31.7 KG/M2

## 2019-02-19 DIAGNOSIS — R10.9 FLANK PAIN: Primary | ICD-10-CM

## 2019-02-19 LAB
BILIRUB UR QL STRIP: NEGATIVE
GLUCOSE UR-MCNC: NEGATIVE MG/DL
KETONES P FAST UR STRIP-MCNC: NEGATIVE MG/DL
PH UR STRIP: 6 [PH] (ref 4.6–8)
PROT UR QL STRIP: NORMAL
SP GR UR STRIP: 1.01 (ref 1–1.03)
UA UROBILINOGEN AMB POC: NORMAL (ref 0.2–1)
URINALYSIS CLARITY POC: CLEAR
URINALYSIS COLOR POC: NORMAL
URINE BLOOD POC: NEGATIVE
URINE LEUKOCYTES POC: NEGATIVE
URINE NITRITES POC: NEGATIVE

## 2019-02-19 NOTE — PROGRESS NOTES
47 y.o. WHITE OR  male who presents for evaluation. He is here just to be checked out because he had a severe left lower quadrant abdominal pain that started this morning. Described it as a sharp pain about 6/10 intensity. He talked to a coworker about it who told him that he should go get checked by us. Concerns about possibly kidney stone? Reports no personal history of nephrolithiasis. No associated dysuria, hematuria, flank pain, ureteral colic. No nausea, vomiting, diarrhea, GI bleeding, fevers. He actually feels fine this afternoon, the symptoms resolved after a few minutes.   He is known to have diverticulosis on last colonoscopy by Dr. Ridge Esparza    Past Medical History:   Diagnosis Date    Arthritis 1/16    djd on t and l spine films YOBANI    Back pain     Bipolar disorder ([facility] [address]     Dr. Lakia Castaneda since 1984    Chronic renal insufficiency     Lithium induced, Dr. Tiffanie Brown 2007    Colon adenoma     and diverticulosis 7/15 Dr Ridge Esparza    Dyslipidemia     calculated 10 yr risk score was 5.9% (4/14); 5.4% (11/14); 4.3% (5/15); 6.7% (5/16); 7.3% (11/16)    Erectile dysfunction 8/10/2017    Hypertension     Myofascial pain 2016    Dr Donald Echavarria    Obesity     IF 11/18 start weight 222 lbs    OTIS on CPAP     Dr Gonzalez Barefoot Proteinuria     minimal 2011    Subacromial bursitis     right Dr. Faiza Dorsey     Past Surgical History:   Procedure Laterality Date    CARDIAC SURG PROCEDURE UNLIST      ETT negative 2004, 2009    HX COLONOSCOPY      Dr Ridge Esparza 7/15 adenoma and divertics     Social History     Socioeconomic History    Marital status:      Spouse name: Not on file    Number of children: 1    Years of education: Not on file    Highest education level: Not on file   Social Needs    Financial resource strain: Not on file    Food insecurity - worry: Not on file    Food insecurity - inability: Not on file   [employer] needs - medical: Not on file   [employer] needs - non-medical: Not on file   Occupational History    Occupation: [address]   Tobacco Use    Smoking status: Never Smoker    Smokeless tobacco: Never Used   Substance and Sexual Activity    Alcohol use: Yes     Comment: social    Drug use: No    Sexual activity: Not on file   Other Topics Concern    Not on file   Social History Narrative    Not on file     Current Outpatient Medications   Medication Sig    OXcarbazepine (TRILEPTAL) 300 mg tablet Take  by mouth.  OLANZapine (ZYPREXA) 10 mg tablet Take 10 mg by mouth nightly.  losartan (COZAAR) 100 mg tablet Take 50 mg by mouth daily.  polyethylene glycol (MIRALAX) 17 gram packet Take 17 g by mouth daily.  multivitamin (ONE A DAY) tablet Take 1 Tab by mouth daily.  Cholecalciferol, Vitamin D3, (VITAMIN D) 2,000 unit Cap Take  by mouth.  lamoTRIgine (LAMICTAL) 100 mg tablet Take 100 mg by mouth as directed. Pt takes 100mg in the am and 200mg in the pm        sildenafil citrate (VIAGRA) 100 mg tablet Take 1 Tab by mouth as needed. (Patient taking differently: Take 100 mg by mouth as needed.)     No current facility-administered medications for this visit. No Known Allergies    Visit Vitals  /86   Pulse 61   Temp 97.8 °F (36.6 °C) (Oral)   Resp 14   Ht 5' 9\" (1.753 m)   Wt 214 lb (97.1 kg)   SpO2 98%   BMI 31.60 kg/m²   A&O x3  Affect is appropriate. Mood stable  No apparent distress  Anicteric, no JVD, adenopathy or thyromegaly. No carotid bruits or radiated murmur  Lungs clear to auscultation, no wheezes or rales  Heart showed regular rate and rhythm. No murmur, rubs, gallops  Abdomen soft nontender, no hepatosplenomegaly or masses.  and rectal deferred  Extremities without edema.   Pulses 1-2+ symmetrically    Results for orders placed or performed in visit on 02/19/19   AMB POC URINALYSIS DIP STICK AUTO W/O MICRO   Result Value Ref Range    Color (UA POC) Light Yellow     Clarity (UA POC) Clear     Glucose (UA POC) Negative Negative    Bilirubin (UA POC) Negative Negative    Ketones (UA POC) Negative Negative    Specific gravity (UA POC) 1.010 1.001 - 1.035    Blood (UA POC) Negative Negative    pH (UA POC) 6.0 4.6 - 8.0    Protein (UA POC) 2+ Negative    Urobilinogen (UA POC) 0.2 mg/dL 0.2 - 1    Nitrites (UA POC) Negative Negative    Leukocyte esterase (UA POC) Negative Negative     Assessment and plan:  1. Transient left lower quadrant pain etiology unclear. We discussed observation versus proceeding with workup which includes blood work, CT, etc.  For now, since his symptoms have resolved completely, he just wants to observe. Call if recurring or worsening symptoms        Above conditions discussed at length and patient vocalized understanding.   All questions answered to patient satisfaction

## 2019-02-19 NOTE — PROGRESS NOTES
1. Have you been to the ER, urgent care clinic or hospitalized since your last visit? NO.     2. Have you seen or consulted any other health care providers outside of the [address] [place] [address] since your last visit (Include any pap smears or colon screening)? NO      Chief Complaint   Patient presents with    Abdominal Pain     intermittent LLQ pain onset this morning. Pt denies any urinary pain or urgency.      Other     pt requesting to have creatinine, Potassium and phosphorus levels checked

## 2019-02-27 ENCOUNTER — APPOINTMENT (OUTPATIENT)
Dept: INTERNAL MEDICINE CLINIC | Age: 54
End: 2019-02-27

## 2019-02-27 ENCOUNTER — HOSPITAL ENCOUNTER (OUTPATIENT)
Dept: LAB | Age: 54
Discharge: HOME | End: 2019-02-27
Payer: COMMERCIAL

## 2019-02-27 DIAGNOSIS — Z00.00 PHYSICAL EXAM: ICD-10-CM

## 2019-02-27 LAB
ERYTHROCYTE [DISTWIDTH] IN BLOOD BY AUTOMATED COUNT: 14.5 % (ref 11.6–14.5)
HCT VFR BLD AUTO: 38.9 % (ref 36–48)
HGB BLD-MCNC: 12.5 G/DL (ref 13–16)
MCH RBC QN AUTO: 27.8 PG (ref 24–34)
MCHC RBC AUTO-ENTMCNC: 32.1 G/DL (ref 31–37)
MCV RBC AUTO: 86.6 FL (ref 74–97)
PLATELET # BLD AUTO: 243 K/UL (ref 135–420)
PMV BLD AUTO: 10 FL (ref 9.2–11.8)
RBC # BLD AUTO: 4.49 M/UL (ref 4.7–5.5)
WBC # BLD AUTO: 4.2 K/UL (ref 4.6–13.2)

## 2019-02-27 PROCEDURE — 36415 COLL VENOUS BLD VENIPUNCTURE: CPT

## 2019-02-27 PROCEDURE — 85027 COMPLETE CBC AUTOMATED: CPT

## 2019-11-19 ENCOUNTER — HOSPITAL ENCOUNTER (OUTPATIENT)
Dept: LAB | Age: 54
Discharge: HOME | End: 2019-11-19
Payer: COMMERCIAL

## 2019-11-19 ENCOUNTER — APPOINTMENT (OUTPATIENT)
Dept: INTERNAL MEDICINE CLINIC | Age: 54
End: 2019-11-19

## 2019-11-19 DIAGNOSIS — Z00.00 PHYSICAL EXAM: ICD-10-CM

## 2019-11-19 DIAGNOSIS — D64.9 ANEMIA, UNSPECIFIED TYPE: ICD-10-CM

## 2019-11-19 LAB
ALBUMIN SERPL-MCNC: 3.9 G/DL (ref 3.4–5)
ALBUMIN/GLOB SERPL: 1.4 {RATIO} (ref 0.8–1.7)
ALP SERPL-CCNC: 131 U/L (ref 45–117)
ALT SERPL-CCNC: 32 U/L (ref 16–61)
ANION GAP SERPL CALC-SCNC: 5 MMOL/L (ref 3–18)
AST SERPL-CCNC: 13 U/L (ref 10–38)
BILIRUB SERPL-MCNC: 0.4 MG/DL (ref 0.2–1)
BUN SERPL-MCNC: 39 MG/DL (ref 7–18)
BUN/CREAT SERPL: 14 (ref 12–20)
CALCIUM SERPL-MCNC: 9.4 MG/DL (ref 8.5–10.1)
CHLORIDE SERPL-SCNC: 110 MMOL/L (ref 100–111)
CO2 SERPL-SCNC: 28 MMOL/L (ref 21–32)
CREAT SERPL-MCNC: 2.83 MG/DL (ref 0.6–1.3)
ERYTHROCYTE [DISTWIDTH] IN BLOOD BY AUTOMATED COUNT: 14.4 % (ref 11.6–14.5)
GLOBULIN SER CALC-MCNC: 2.7 G/DL (ref 2–4)
GLUCOSE SERPL-MCNC: 105 MG/DL (ref 74–99)
HCT VFR BLD AUTO: 39.7 % (ref 36–48)
HGB BLD-MCNC: 12.5 G/DL (ref 13–16)
MCH RBC QN AUTO: 28 PG (ref 24–34)
MCHC RBC AUTO-ENTMCNC: 31.5 G/DL (ref 31–37)
MCV RBC AUTO: 89 FL (ref 74–97)
PLATELET # BLD AUTO: 270 K/UL (ref 135–420)
PMV BLD AUTO: 10.4 FL (ref 9.2–11.8)
POTASSIUM SERPL-SCNC: 4.4 MMOL/L (ref 3.5–5.5)
PROT SERPL-MCNC: 6.6 G/DL (ref 6.4–8.2)
RBC # BLD AUTO: 4.46 M/UL (ref 4.7–5.5)
SODIUM SERPL-SCNC: 143 MMOL/L (ref 136–145)
WBC # BLD AUTO: 6 K/UL (ref 4.6–13.2)

## 2019-11-19 PROCEDURE — 80053 COMPREHEN METABOLIC PANEL: CPT

## 2019-11-19 PROCEDURE — 85027 COMPLETE CBC AUTOMATED: CPT

## 2019-11-19 PROCEDURE — 36415 COLL VENOUS BLD VENIPUNCTURE: CPT

## 2019-11-19 PROCEDURE — 80061 LIPID PANEL: CPT

## 2019-11-20 LAB
CHOLEST SERPL-MCNC: 297 MG/DL
HDLC SERPL-MCNC: 39 MG/DL (ref 40–60)
HDLC SERPL: 7.6 {RATIO} (ref 0–5)
LDLC SERPL CALC-MCNC: 192 MG/DL (ref 0–100)
LIPID PROFILE,FLP: ABNORMAL
TRIGL SERPL-MCNC: 330 MG/DL (ref ?–150)
VLDLC SERPL CALC-MCNC: 66 MG/DL

## 2019-12-06 NOTE — PROGRESS NOTES
49 y.o. white male who presents for RPE    No cardiovascular complaints. He was trying to keep the workouts going but not doing a good job of late. He saw the dietician some time back and is willing to go back. Weight is going back up as below     Vitals 12/9/2019 2/19/2019 11/20/2018 11/17/2017 8/10/2017   Weight 224 lb 214 lb 222 lb 209 lb 3.2 oz 200 lb     No gi or gu complaints. Specifically no bleeding anywhere    No new psych issues, continues to see Dr Howard Matson and no new meds.       Dr. Carlos Valero sees him yearly and he does have significant proteinuria and describes foaminess in the ruine    Remains on cpap and doing well    Past Medical History:   Diagnosis Date    Arthritis 1/16    djd on t and l spine films YOBANI    Back pain     Bipolar disorder ([facility] [address]     Dr. Patrice Gilman since 1984    Chronic renal insufficiency     Lithium induced, Dr. Carlos Valero 2007    Colon adenoma     and diverticulosis 7/15 Dr Brenda Garcia    Dyslipidemia     calculated 10 yr risk score was 5.9% (4/14); 5.4% (11/14); 4.3% (5/15); 6.7% (5/16); 7.3% (11/16)    Erectile dysfunction 8/10/2017    Hypertension     Myofascial pain 2016    Dr Madeline Fierro    Obesity     IF 11/18 start weight 222 lbs    OTIS on CPAP     Dr Sergio Fox Proteinuria     minimal 2011    Subacromial bursitis     right Dr. Kamilah Hackett     Past Surgical History:   Procedure Laterality Date    CARDIAC SURG PROCEDURE UNLIST      ETT negative 2004, 2009    HX COLONOSCOPY      Dr Brenda Garcia 7/15 adenoma and divertics     Social History     Socioeconomic History    Marital status:      Spouse name: Not on file    Number of children: 1    Years of education: Not on file    Highest education level: Not on file   Occupational History    Occupation: [employer] Financial resource strain: Not on file    Food insecurity:     Worry: Not on file     Inability: Not on file   [employer] needs:     Medical: Not on file     Non-medical: Not on file   Tobacco Use    Smoking status: Never Smoker    Smokeless tobacco: Never Used   Substance and Sexual Activity    Alcohol use: Yes     Comment: social    Drug use: No    Sexual activity: Not Currently   Lifestyle    Physical activity:     Days per week: Not on file     Minutes per session: Not on file    Stress: Not on file   Relationships    Social connections:     Talks on phone: Not on file     Gets together: Not on file     Attends Mandaeism service: Not on file     Active member of club or organization: Not on file     Attends meetings of clubs or organizations: Not on file     Relationship status: Not on file    Intimate partner violence:     Fear of current or ex partner: Not on file     Emotionally abused: Not on file     Physically abused: Not on file     Forced sexual activity: Not on file   Other Topics Concern    Not on file   Social History Narrative    Not on file     Current Outpatient Medications   Medication Sig    atorvastatin (LIPITOR) 10 mg tablet Take 1 Tab by mouth daily.  OXcarbazepine (TRILEPTAL) 300 mg tablet Take  by mouth.  OLANZapine (ZYPREXA) 10 mg tablet Take 10 mg by mouth nightly.  losartan (COZAAR) 100 mg tablet Take 50 mg by mouth daily.  polyethylene glycol (MIRALAX) 17 gram packet Take 17 g by mouth daily.  multivitamin (ONE A DAY) tablet Take 1 Tab by mouth daily.  Cholecalciferol, Vitamin D3, (VITAMIN D) 2,000 unit Cap Take  by mouth.  lamoTRIgine (LAMICTAL) 100 mg tablet Take 100 mg by mouth as directed. Pt takes 100mg in the am and 200mg in the pm        sildenafil citrate (VIAGRA) 100 mg tablet Take 1 Tab by mouth as needed. (Patient taking differently: Take 100 mg by mouth as needed.)     No current facility-administered medications for this visit.       No Known Allergies    REVIEW OF SYSTEMS[place] [facility]  Ophtho  no vision change or eye pain  Oral  no mouth pain, tongue or tooth problems  Ears  no hearing loss, ear pain, fullness, no swallowing problems  Cardiac  no CP, PND, orthopnea, edema, palpitations or syncope  Chest  no breast masses  Resp  no wheezing, chronic coughing, dyspnea  GI  no heartburn, nausea, vomiting, change in bowel habits, bleeding, hemorrhoids  Urinary  no dysuria, hematuria, flank pain, urgency, frequency    Visit Vitals  /80 (BP 1 Location: Left arm, BP Patient Position: Sitting)   Pulse 90   Temp 98.1 °F (36.7 °C) (Oral)   Resp 16   Ht 5' 9\" (1.753 m)   Wt 224 lb (101.6 kg)   SpO2 96%   BMI 33.08 kg/m²   A&O x3  Affect is appropriate. Mood stable  No apparent distress  Anicteric, no JVD, adenopathy or thyromegaly. No carotid bruits or radiated murmur  Lungs clear to auscultation, no wheezes or rales  Heart showed regular rate and rhythm. No murmur, rubs, gallops  Abdomen soft nontender, no hepatosplenomegaly or masses. Extremities without edema.   Pulses 1-2+ symmetrically    LABS  From 11/09 showed gluc 102, cr 1.80, gfr 44,          chol 215, tg 350, hdl 34, ldl-c 111  From 1/10 showed       alt 30,         chol 200, tg 240, hdl 33, ldl-c 119  From 6/11 showed   gluc 86,   cr 2.30,    alt 17,         chol 198, tg 134, hdl 38, ldl-c 133, wbc 2.8, hb 12.4, plt 190,        psa 0.4  From 12/11 showed gluc 95,   cr 2.01, gfr 39,   ldl-p 1438, chol 201, tg 144, hdl 48, ldl-c 124,            24 hr U pro 474  From 6/12 showed       ldl-p 1611, chol 228, tg 231, hdl 39, ldl-c 143  From 5/14 showed   gluc 96,   cr 1.95, gfr 37, alt 13,         chol 242, tg 450, hdl 32, ldl-c na,   wbc 6.1, hb 13.2, plt 229, psa 0.30 ,ua 1+ pro, tsh 1.83,  vit d 34.7  From 11/14 showed              chol 250, tg 340, hdl 35, ldl-c 147  From 5/15 showed   gluc 97,   cr 2.29, gfr 31,   hba1c 5.7, chol 215, tg 255, hdl 36, ldl-c 128  From 11/15 showed gluc 104, cr 2.14, gfr 35,   hba1c 5.6,             tsh 1.62  From 5/16 showed   gluc 92,   cr 2.11, gfr 34,          chol 252, tg 438, hdl 35, ldl-c na,   wbc 5.2, hb 13.6, plt 233  From 11/16 showed       hba1c 5.1, chol 254, tg 321, hdl 39, ldl-c 151  From 11/17 showed gluc 90,   cr 2.31, gfr 32, alt 24,         chol 278, tg 342, hdl 38, ldl-c 182, wbc 6.3, hb 13.6, plt 253  From 11/18 showed gluc 102, cr 2.44, gfr 28, alt 36,         chol 250, tg 436, hdl 37, ldl-c na,   wbc 5.9, hb 12.7, plt 268, psa 0.40    Results for orders placed or performed during the hospital encounter of 11/19/19   CBC W/O DIFF   Result Value Ref Range    WBC 6.0 4.6 - 13.2 K/uL    RBC 4.46 (L) 4.70 - 5.50 M/uL    HGB 12.5 (L) 13.0 - 16.0 g/dL    HCT 39.7 36.0 - 48.0 %    MCV 89.0 74.0 - 97.0 FL    MCH 28.0 24.0 - 34.0 PG    MCHC 31.5 31.0 - 37.0 g/dL    RDW 14.4 11.6 - 14.5 %    PLATELET 549 070 - 124 K/uL    MPV 10.4 9.2 - 11.8 FL   LIPID PANEL   Result Value Ref Range    LIPID PROFILE          Cholesterol, total 297 (H) <200 MG/DL    Triglyceride 330 (H) <150 MG/DL    HDL Cholesterol 39 (L) 40 - 60 MG/DL    LDL, calculated 192 (H) 0 - 100 MG/DL    VLDL, calculated 66 MG/DL    CHOL/HDL Ratio 7.6 (H) 0 - 5.0     METABOLIC PANEL, COMPREHENSIVE   Result Value Ref Range    Sodium 143 136 - 145 mmol/L    Potassium 4.4 3.5 - 5.5 mmol/L    Chloride 110 100 - 111 mmol/L    CO2 28 21 - 32 mmol/L    Anion gap 5 3.0 - 18 mmol/L    Glucose 105 (H) 74 - 99 mg/dL    BUN 39 (H) 7.0 - 18 MG/DL    Creatinine 2.83 (H) 0.6 - 1.3 MG/DL    BUN/Creatinine ratio 14 12 - 20      GFR est AA 29 (L) >60 ml/min/1.73m2    GFR est non-AA 24 (L) >60 ml/min/1.73m2    Calcium 9.4 8.5 - 10.1 MG/DL    Bilirubin, total 0.4 0.2 - 1.0 MG/DL    ALT (SGPT) 32 16 - 61 U/L    AST (SGOT) 13 10 - 38 U/L    Alk.  phosphatase 131 (H) 45 - 117 U/L    Protein, total 6.6 6.4 - 8.2 g/dL    Albumin 3.9 3.4 - 5.0 g/dL    Globulin 2.7 2.0 - 4.0 g/dL    A-G Ratio 1.4 0.8 - 1.7       Patient Active Problem List   Diagnosis Code    Bipolar disorder Dr. Ajit Holliday F31.9    Dyslipidemia E78.5    Primary hypertension I10    OTIS on CPAP Dr Rosario Selby G47.33, Z99.89    Colon adenoma 7/15 Dr Saira Bee D12.6    IFG (impaired fasting glucose) R73.01    Obesity (BMI 30-39. 9) E66.9    Chronic kidney disease (CKD) John Galarza N18.3    Erectile dysfunction N52.9     Assessment and plan:  1. BPD. Continue current and f/u Dr. Ricardo Ellis  2. CRI and proteinuria. Continue cozaar and f/u Dr. Kika Galarza  3. Dyslipidemia. Long discussion. Check Upr/cr. Described mechanism of rising chol with worsening proteinuria. Start lipitor after disucssing potential benefits and sfx. Recheck in 4mos  4. HTN. On ARB  5. Obesity. Lifestyle and dietary measures. Portion control reiterated. He is aware of IF. Will send back to dietician  6. Prediabetes. As per #5  7. OTIS on cpap. F/U Dr Lexi Bailey  8. Colon adenoma. Fiber, colo 2020  9. Anemia. Check serologies  10. PVX ton be given next visit      RTC 4/20    Above conditions discussed at length and patient vocalized understanding.   All questions answered to patient satisfaction      ADDENDUM  From 5/19 showed 24h U 1887  From 9/19 showed 24h U 2237

## 2019-12-09 ENCOUNTER — OFFICE VISIT (OUTPATIENT)
Dept: INTERNAL MEDICINE CLINIC | Age: 54
End: 2019-12-09

## 2019-12-09 ENCOUNTER — HOSPITAL ENCOUNTER (OUTPATIENT)
Dept: LAB | Age: 54
Discharge: HOME | End: 2019-12-09
Payer: COMMERCIAL

## 2019-12-09 VITALS
RESPIRATION RATE: 16 BRPM | SYSTOLIC BLOOD PRESSURE: 140 MMHG | HEART RATE: 90 BPM | HEIGHT: 69 IN | BODY MASS INDEX: 33.18 KG/M2 | OXYGEN SATURATION: 96 % | WEIGHT: 224 LBS | TEMPERATURE: 98.1 F | DIASTOLIC BLOOD PRESSURE: 80 MMHG

## 2019-12-09 DIAGNOSIS — E78.5 DYSLIPIDEMIA: Primary | ICD-10-CM

## 2019-12-09 DIAGNOSIS — E66.9 OBESITY (BMI 30-39.9): ICD-10-CM

## 2019-12-09 DIAGNOSIS — R80.9 PROTEINURIA, UNSPECIFIED TYPE: ICD-10-CM

## 2019-12-09 DIAGNOSIS — R73.01 IFG (IMPAIRED FASTING GLUCOSE): ICD-10-CM

## 2019-12-09 DIAGNOSIS — D64.9 ANEMIA, UNSPECIFIED TYPE: ICD-10-CM

## 2019-12-09 DIAGNOSIS — F31.78 BIPOLAR DISORDER, IN FULL REMISSION, MOST RECENT EPISODE MIXED (HCC): ICD-10-CM

## 2019-12-09 DIAGNOSIS — N18.31 CHRONIC KIDNEY DISEASE (CKD) STAGE G3A/A3, MODERATELY DECREASED GLOMERULAR FILTRATION RATE (GFR) BETWEEN 45-59 ML/MIN/1.73 SQUARE METER AND ALBUMINURIA CREATININE RATIO GREATER THAN 300 MG/G (HCC): ICD-10-CM

## 2019-12-09 LAB
CREAT UR-MCNC: 41 MG/DL (ref 30–125)
PROT UR-MCNC: 116 MG/DL
PROT/CREAT UR-RTO: 2.8

## 2019-12-09 PROCEDURE — 84156 ASSAY OF PROTEIN URINE: CPT

## 2019-12-09 RX ORDER — ATORVASTATIN CALCIUM 10 MG/1
10 TABLET, FILM COATED ORAL DAILY
Qty: 30 TAB | Refills: 5 | Status: SHIPPED | OUTPATIENT
Start: 2019-12-09 | End: 2020-06-09

## 2019-12-09 NOTE — PROGRESS NOTES
Chief Complaint   Patient presents with    Complete Physical     Yearly physical with labs. Health Maintenance Due   Topic Date Due    Shingrix Vaccine Age 50> (1 of 2) 07/25/2016     1. Have you been to the ER, urgent care clinic or hospitalized since your last visit? YES. Urgent Care November 2019 for facial swelling. 2. Have you seen or consulted any other health care providers outside of the [address] [place] [address] since your last visit (Include any pap smears or colon screening)? NO      Do you have an Advanced Directive? NO    Would you like information on Advanced Directives? NO    Learning Assessment 12/9/2019   PRIMARY LEARNER Patient   HIGHEST LEVEL OF EDUCATION - PRIMARY LEARNER  > 4 YEARS OF COLLEGE   BARRIERS PRIMARY LEARNER NONE   CO-LEARNER CAREGIVER No   PRIMARY LANGUAGE ENGLISH   LEARNER PREFERENCE PRIMARY DEMONSTRATION     READING   ANSWERED BY patient   RELATIONSHIP SELF     Abuse Screening Questionnaire 12/9/2019   Do you ever feel afraid of your partner? N   Are you in a relationship with someone who physically or mentally threatens you? N   Is it safe for you to go home? Y     3 most recent PHQ Screens 12/9/2019   Little interest or pleasure in doing things Not at all   Feeling down, depressed, irritable, or hopeless Not at all   Total Score PHQ 2 0     Fall Risk Assessment, last 12 mths 12/9/2019   Able to walk? Yes   Fall in past 12 months?  No

## 2019-12-11 ENCOUNTER — TELEPHONE (OUTPATIENT)
Dept: INTERNAL MEDICINE CLINIC | Age: 54
End: 2019-12-11

## 2019-12-12 NOTE — TELEPHONE ENCOUNTER
Patient was given message and stated this is incorrect.  Pt stated it is highly unlikely for for him to have normal protein in urine because the nephrologist has told him his numbers were abnormal.   Please advise

## 2019-12-12 NOTE — TELEPHONE ENCOUNTER
I see that - the nephrologist sent the reports  It's the exact same test so i'm not sure why we got the result we got  In any case, it doesn't change the plan - start lipitor as we discussed

## 2019-12-12 NOTE — TELEPHONE ENCOUNTER
pls call    Results for orders placed or performed during the hospital encounter of 12/09/19   PROTEIN/CREATININE RATIO, URINE   Result Value Ref Range    Protein, urine random 116 (H) <11.9 mg/dL    Creatinine, urine 41.00 30 - 125 mg/dL    Protein/Creat.  urine Ratio 2.8     '  Normal protein in urine

## 2019-12-13 ENCOUNTER — TELEPHONE (OUTPATIENT)
Dept: INTERNAL MEDICINE CLINIC | Age: 54
End: 2019-12-13

## 2019-12-13 NOTE — TELEPHONE ENCOUNTER
Pt called wanting to know if RD saw nephrologist lab results. I told him that Rd had put a message in that he did see results so not sure why discrepancy between the two offices but that it doesn't change the plan, to start the Lipitor has Rd discussed with pt. He voiced understanding. He did state that there was another urine sample in bin when he put his in and maybe the two samples could have gotten mixed up. I told him the nurses usually write name of pt on specimen container before they hand it over so not sure. He said he didn't notice if it had a name on it or not but that it sounds like it wouldn't change anything RD told him to do.

## 2020-06-09 DIAGNOSIS — E78.5 DYSLIPIDEMIA: ICD-10-CM

## 2020-06-09 RX ORDER — ATORVASTATIN CALCIUM 10 MG/1
TABLET, FILM COATED ORAL
Qty: 90 TAB | Refills: 3 | Status: SHIPPED | OUTPATIENT
Start: 2020-06-09 | End: 2020-12-17 | Stop reason: DRUGHIGH

## 2020-12-10 ENCOUNTER — APPOINTMENT (OUTPATIENT)
Dept: INTERNAL MEDICINE CLINIC | Age: 55
End: 2020-12-10

## 2020-12-10 ENCOUNTER — HOSPITAL ENCOUNTER (OUTPATIENT)
Dept: LAB | Age: 55
Discharge: HOME | End: 2020-12-10
Payer: COMMERCIAL

## 2020-12-10 DIAGNOSIS — D64.9 ANEMIA, UNSPECIFIED TYPE: ICD-10-CM

## 2020-12-10 DIAGNOSIS — E78.5 DYSLIPIDEMIA: ICD-10-CM

## 2020-12-10 DIAGNOSIS — R73.01 IFG (IMPAIRED FASTING GLUCOSE): ICD-10-CM

## 2020-12-10 LAB
ALBUMIN SERPL-MCNC: 3.9 G/DL (ref 3.4–5)
ALBUMIN/GLOB SERPL: 1.4 {RATIO} (ref 0.8–1.7)
ALP SERPL-CCNC: 127 U/L (ref 45–117)
ALT SERPL-CCNC: 23 U/L (ref 16–61)
ANION GAP SERPL CALC-SCNC: 5 MMOL/L (ref 3–18)
AST SERPL-CCNC: 17 U/L (ref 10–38)
BILIRUB SERPL-MCNC: 0.3 MG/DL (ref 0.2–1)
BUN SERPL-MCNC: 41 MG/DL (ref 7–18)
BUN/CREAT SERPL: 12 (ref 12–20)
CALCIUM SERPL-MCNC: 9.2 MG/DL (ref 8.5–10.1)
CHLORIDE SERPL-SCNC: 108 MMOL/L (ref 100–111)
CHOLEST SERPL-MCNC: 231 MG/DL
CO2 SERPL-SCNC: 28 MMOL/L (ref 21–32)
CREAT SERPL-MCNC: 3.3 MG/DL (ref 0.6–1.3)
ERYTHROCYTE [DISTWIDTH] IN BLOOD BY AUTOMATED COUNT: 13.8 % (ref 11.6–14.5)
GLOBULIN SER CALC-MCNC: 2.7 G/DL (ref 2–4)
GLUCOSE SERPL-MCNC: 87 MG/DL (ref 74–99)
HCT VFR BLD AUTO: 38.1 % (ref 36–48)
HDLC SERPL-MCNC: 41 MG/DL (ref 40–60)
HDLC SERPL: 5.6 {RATIO} (ref 0–5)
HGB BLD-MCNC: 12.3 G/DL (ref 13–16)
LDLC SERPL CALC-MCNC: 117.6 MG/DL (ref 0–100)
LIPID PROFILE,FLP: ABNORMAL
MCH RBC QN AUTO: 27.5 PG (ref 24–34)
MCHC RBC AUTO-ENTMCNC: 32.3 G/DL (ref 31–37)
MCV RBC AUTO: 85.2 FL (ref 74–97)
PLATELET # BLD AUTO: 274 K/UL (ref 135–420)
PMV BLD AUTO: 9.9 FL (ref 9.2–11.8)
POTASSIUM SERPL-SCNC: 4.4 MMOL/L (ref 3.5–5.5)
PROT SERPL-MCNC: 6.6 G/DL (ref 6.4–8.2)
RBC # BLD AUTO: 4.47 M/UL (ref 4.7–5.5)
SODIUM SERPL-SCNC: 141 MMOL/L (ref 136–145)
TRIGL SERPL-MCNC: 362 MG/DL (ref ?–150)
VLDLC SERPL CALC-MCNC: 72.4 MG/DL
WBC # BLD AUTO: 6.6 K/UL (ref 4.6–13.2)

## 2020-12-10 PROCEDURE — 80061 LIPID PANEL: CPT

## 2020-12-10 PROCEDURE — 36415 COLL VENOUS BLD VENIPUNCTURE: CPT

## 2020-12-10 PROCEDURE — 80053 COMPREHEN METABOLIC PANEL: CPT

## 2020-12-10 PROCEDURE — 85027 COMPLETE CBC AUTOMATED: CPT

## 2020-12-13 NOTE — PROGRESS NOTES
48 y.o. white male who presents for RPE    No cardiovascular complaints. He has not gone back to [place] but is working out at home, doing mostly weights 3x/week and also taking walks. Doing much better with portions with resulting wt loss as below     Vitals 12/17/2020 12/9/2019 2/19/2019 11/20/2018   Weight 215 lb 224 lb 214 lb 222 lb     No gi or gu complaints. Still no bleeding to report. He is scheduled for colo in Feb through Dr Efren Storm group    No new psych issues, continues to see Dr Genesis Sarmiento and no new meds.       Dr. Kirsty Chambers sees him yearly     Remains on cpap and doing well    We started lipitor after the last visit and no sfx to report    He apparently developed lumbar radiculitis on the right mainly and saw Dr Ubaldo Sy who confirmed it on emg    Past Medical History:   Diagnosis Date    Arthritis 1/16    djd on t and l spine films YOBANI    Back pain     Bipolar disorder ([facility] [address]     Dr. Phil Goddard since 1984    Chronic renal insufficiency     Lithium induced, Dr. Kirsty Chambers 2007    Colon adenoma     and diverticulosis 7/15 Dr Vale Hollingsworth    Dyslipidemia     calculated 10 yr risk score was 5.9% (4/14); 5.4% (11/14); 4.3% (5/15); 6.7% (5/16); 7.3% (11/16)    Erectile dysfunction 8/10/2017    Hypertension     Lumbar radiculitis 2020    on emg Dr Edith Grimes Myofascial pain 2016    Dr José Luis Everett    Obesity     IF 11/18 start weight 222 lbs    OTIS on CPAP     Dr Donita Peters Proteinuria     minimal 2011    Subacromial bursitis     right Dr. Kolton Gordon     Past Surgical History:   Procedure Laterality Date    CARDIAC SURG PROCEDURE UNLIST      ETT negative 2004, 2009    HX COLONOSCOPY      Dr Vale Hollingsworth 7/15 adenoma and divertics     Social History     Socioeconomic History    Marital status:      Spouse name: Not on file    Number of children: 1    Years of education: Not on file    Highest education level: Not on file   Occupational History    Occupation: [employer] resource strain: Not on file    Food insecurity     Worry: Not on file     Inability: Not on file    Transportation needs     Medical: Not on file     Non-medical: Not on file   Tobacco Use    Smoking status: Never Smoker    Smokeless tobacco: Never Used   Substance and Sexual Activity    Alcohol use: Yes     Comment: social    Drug use: No    Sexual activity: Not Currently   Lifestyle    Physical activity     Days per week: Not on file     Minutes per session: Not on file    Stress: Not on file   Relationships    Social connections     Talks on phone: Not on file     Gets together: Not on file     Attends Holiness service: Not on file     Active member of club or organization: Not on file     Attends meetings of clubs or organizations: Not on file     Relationship status: Not on file    Intimate partner violence     Fear of current or ex partner: Not on file     Emotionally abused: Not on file     Physically abused: Not on file     Forced sexual activity: Not on file   Other Topics Concern    Not on file   Social History Narrative    Not on file     Current Outpatient Medications   Medication Sig    atorvastatin (LIPITOR) 20 mg tablet Take 1 Tab by mouth daily.  OXcarbazepine (TRILEPTAL) 300 mg tablet Take  by mouth.  OLANZapine (ZYPREXA) 10 mg tablet Take 10 mg by mouth nightly.  losartan (COZAAR) 100 mg tablet Take 50 mg by mouth daily.  polyethylene glycol (MIRALAX) 17 gram packet Take 17 g by mouth daily.  multivitamin (ONE A DAY) tablet Take 1 Tab by mouth daily.  lamoTRIgine (LAMICTAL) 100 mg tablet Take 100 mg by mouth as directed. Pt takes 100mg in the am and 200mg in the pm        sildenafil citrate (VIAGRA) 100 mg tablet Take 1 Tab by mouth as needed. (Patient taking differently: Take 100 mg by mouth as needed.)    Cholecalciferol, Vitamin D3, (VITAMIN D) 2,000 unit Cap Take  by mouth. No current facility-administered medications for this visit.       No Known Allergies    REVIEW OF SYSTEMS: colo 2015 Dr Buffy Kohler  Ophthvince  no vision change or eye pain  Oral  no mouth pain, tongue or tooth problems  Ears  no hearing loss, ear pain, fullness, no swallowing problems  Cardiac  no CP, PND, orthopnea, edema, palpitations or syncope  Chest  no breast masses  Resp  no wheezing, chronic coughing, dyspnea  GI  no heartburn, nausea, vomiting, change in bowel habits, bleeding, hemorrhoids  Urinary  no dysuria, hematuria, flank pain, urgency, frequency    Visit Vitals  /88   Pulse 81   Temp 97.2 °F (36.2 °C) (Temporal)   Resp 16   Ht 5' 9\" (1.753 m)   Wt 215 lb (97.5 kg)   SpO2 97%   BMI 31.75 kg/m²   A&O x3  Affect is appropriate. Mood stable  No apparent distress  Anicteric, no JVD, adenopathy or thyromegaly. No carotid bruits or radiated murmur  Lungs clear to auscultation, no wheezes or rales  Heart showed regular rate and rhythm. No murmur, rubs, gallops  Abdomen soft nontender, no hepatosplenomegaly or masses. Extremities without edema.   Pulses 1-2+ symmetrically    LABS  From 11/09 showed gluc 102, cr 1.80, gfr 44,          chol 215, tg 350, hdl 34, ldl-c 111  From 1/10 showed       alt 30,         chol 200, tg 240, hdl 33, ldl-c 119  From 6/11 showed   gluc 86,   cr 2.30,    alt 17,         chol 198, tg 134, hdl 38, ldl-c 133, wbc 2.8, hb 12.4, plt 190, psa 0.4  From 12/11 showed gluc 95,   cr 2.01, gfr 39,   ldl-p 1438, chol 201, tg 144, hdl 48, ldl-c 124,              24 hr Upr 474  From 6/12 showed       ldl-p 1611, chol 228, tg 231, hdl 39, ldl-c 143  From 5/14 showed   gluc 96,   cr 1.95, gfr 37, alt 13,         chol 242, tg 450, hdl 32, ldl-c na,   wbc 6.1, hb 13.2, plt 229, psa 0.30, tsh 1.83,  vit d 34.7  From 11/14 showed              chol 250, tg 340, hdl 35, ldl-c 147  From 5/15 showed   gluc 97,   cr 2.29, gfr 31,   hba1c 5.7, chol 215, tg 255, hdl 36, ldl-c 128  From 11/15 showed gluc 104, cr 2.14, gfr 35,   hba1c 5.6,           tsh 1.62  From 5/16 showed   gluc 92,   cr 2.11, gfr 34,          chol 252, tg 438, hdl 35, ldl-c na,   wbc 5.2, hb 13.6, plt 233  From 11/16 showed       hba1c 5.1, chol 254, tg 321, hdl 39, ldl-c 151  From 11/17 showed gluc 90,   cr 2.31, gfr 32, alt 24,         chol 278, tg 342, hdl 38, ldl-c 182, wbc 6.3, hb 13.6, plt 253  From 11/18 showed gluc 102, cr 2.44, gfr 28, alt 36,         chol 250, tg 436, hdl 37, ldl-c na,   wbc 5.9, hb 12.7, plt 268, psa 0.40  From 5/19 showed                                   24h Upr 1887  From 9/19 showed                           24h Upr 2237  From 11/29 showed gluc 105, cr 2.83, gfr 24, alt 32,         chol 297, tg 330, hdl 39, ldl-c 192, wbc 6.0, hb 12.5, plt 270    Results for orders placed or performed during the hospital encounter of 12/10/20   LIPID PANEL   Result Value Ref Range    LIPID PROFILE          Cholesterol, total 231 (H) <200 MG/DL    Triglyceride 362 (H) <150 MG/DL    HDL Cholesterol 41 40 - 60 MG/DL    LDL, calculated 117.6 (H) 0 - 100 MG/DL    VLDL, calculated 72.4 MG/DL    CHOL/HDL Ratio 5.6 (H) 0 - 5.0     METABOLIC PANEL, COMPREHENSIVE   Result Value Ref Range    Sodium 141 136 - 145 mmol/L    Potassium 4.4 3.5 - 5.5 mmol/L    Chloride 108 100 - 111 mmol/L    CO2 28 21 - 32 mmol/L    Anion gap 5 3.0 - 18 mmol/L    Glucose 87 74 - 99 mg/dL    BUN 41 (H) 7.0 - 18 MG/DL    Creatinine 3.30 (H) 0.6 - 1.3 MG/DL    BUN/Creatinine ratio 12 12 - 20      GFR est AA 24 (L) >60 ml/min/1.73m2    GFR est non-AA 20 (L) >60 ml/min/1.73m2    Calcium 9.2 8.5 - 10.1 MG/DL    Bilirubin, total 0.3 0.2 - 1.0 MG/DL    ALT (SGPT) 23 16 - 61 U/L    AST (SGOT) 17 10 - 38 U/L    Alk.  phosphatase 127 (H) 45 - 117 U/L    Protein, total 6.6 6.4 - 8.2 g/dL    Albumin 3.9 3.4 - 5.0 g/dL    Globulin 2.7 2.0 - 4.0 g/dL    A-G Ratio 1.4 0.8 - 1.7     CBC W/O DIFF   Result Value Ref Range    WBC 6.6 4.6 - 13.2 K/uL    RBC 4.47 (L) 4.70 - 5.50 M/uL    HGB 12.3 (L) 13.0 - 16.0 g/dL    HCT 38.1 36.0 - 48.0 %    MCV 85.2 74.0 - 97.0 FL    MCH 27.5 24.0 - 34.0 PG    MCHC 32.3 31.0 - 37.0 g/dL    RDW 13.8 11.6 - 14.5 %    PLATELET 675 018 - 231 K/uL    MPV 9.9 9.2 - 11.8 FL     Patient Active Problem List   Diagnosis Code    Bipolar disorder Dr. Farzaneh Baker F31.9    Dyslipidemia E78.5    Primary hypertension I10    OTIS on CPAP Dr Agueda Cole G47.33, Z99.89    Colon adenoma 7/15 Dr Zenaida Majano D12.6    IFG (impaired fasting glucose) R73.01    Obesity (BMI 30-39. 9) E66.9    Chronic kidney disease (CKD) stage G3a/A3 Dr Slade Mcneal N18.31    Erectile dysfunction N52.9     Assessment and plan:  1. BPD. Continue current and f/u Dr. Shelby Smith  2. CRI and proteinuria. Continue cozaar and f/u Dr. Slade Mcneal  3. Dyslipidemia. Inc lipitor to 20 to try to get to target; will add vascepa in future as pt not interested at this time  4. HTN. On ARB  5. Obesity. Congratulated him on his success thus far. Continue lifestyle and dietary measures, portion control  6. IFG. Improved with the wt loss  7. OTIS on cpap. F/U Dr Agueda Cole  8. Colon adenoma. Fiber, colo 2020  9. Anemia. Check serologies  10. PVX to be given next visit      RTC 6/21    Above conditions discussed at length and patient vocalized understanding.   All questions answered to patient satisfaction

## 2020-12-16 NOTE — PROGRESS NOTES
Adri Kamaljit presents today for   Chief Complaint   Patient presents with   [facility] Complete Physical    Labs     12-10-20              Coordination of Care:  1. Have you been to the ER, urgent care clinic since your last visit? Hospitalized since your last visit? no    2. Have you seen or consulted any other health care providers outside of the [address] [place] [address] since your last visit? Include any pap smears or colon screening.  no

## 2020-12-17 ENCOUNTER — HOSPITAL ENCOUNTER (OUTPATIENT)
Dept: LAB | Age: 55
Discharge: HOME | End: 2020-12-17
Payer: COMMERCIAL

## 2020-12-17 ENCOUNTER — OFFICE VISIT (OUTPATIENT)
Dept: INTERNAL MEDICINE CLINIC | Age: 55
End: 2020-12-17
Payer: COMMERCIAL

## 2020-12-17 VITALS
HEIGHT: 69 IN | DIASTOLIC BLOOD PRESSURE: 88 MMHG | TEMPERATURE: 97.2 F | RESPIRATION RATE: 16 BRPM | HEART RATE: 81 BPM | BODY MASS INDEX: 31.84 KG/M2 | SYSTOLIC BLOOD PRESSURE: 132 MMHG | OXYGEN SATURATION: 97 % | WEIGHT: 215 LBS

## 2020-12-17 DIAGNOSIS — G47.33 OSA ON CPAP: ICD-10-CM

## 2020-12-17 DIAGNOSIS — R73.01 IFG (IMPAIRED FASTING GLUCOSE): ICD-10-CM

## 2020-12-17 DIAGNOSIS — D64.9 ANEMIA, UNSPECIFIED TYPE: ICD-10-CM

## 2020-12-17 DIAGNOSIS — D12.6 COLON ADENOMA: ICD-10-CM

## 2020-12-17 DIAGNOSIS — N18.31 CHRONIC KIDNEY DISEASE (CKD) STAGE G3A/A3, MODERATELY DECREASED GLOMERULAR FILTRATION RATE (GFR) BETWEEN 45-59 ML/MIN/1.73 SQUARE METER AND ALBUMINURIA CREATININE RATIO GREATER THAN 300 MG/G (HCC): ICD-10-CM

## 2020-12-17 DIAGNOSIS — Z00.00 PHYSICAL EXAM: Primary | ICD-10-CM

## 2020-12-17 DIAGNOSIS — Z99.89 OSA ON CPAP: ICD-10-CM

## 2020-12-17 DIAGNOSIS — I10 PRIMARY HYPERTENSION: ICD-10-CM

## 2020-12-17 DIAGNOSIS — E66.9 OBESITY (BMI 30-39.9): ICD-10-CM

## 2020-12-17 DIAGNOSIS — E78.5 DYSLIPIDEMIA: ICD-10-CM

## 2020-12-17 DIAGNOSIS — F31.78 BIPOLAR DISORDER, IN FULL REMISSION, MOST RECENT EPISODE MIXED (HCC): ICD-10-CM

## 2020-12-17 LAB
FERRITIN SERPL-MCNC: 169 NG/ML (ref 8–388)
FOLATE SERPL-MCNC: 13.8 NG/ML (ref 3.1–17.5)
HBA1C MFR BLD: 5.3 % (ref 4.2–5.6)
IRON SATN MFR SERPL: 21 % (ref 20–50)
IRON SERPL-MCNC: 64 UG/DL (ref 50–175)
RETICS/RBC NFR AUTO: 1.3 % (ref 0.5–2.3)
TIBC SERPL-MCNC: 310 UG/DL (ref 250–450)
VIT B12 SERPL-MCNC: 424 PG/ML (ref 211–911)

## 2020-12-17 PROCEDURE — 82746 ASSAY OF FOLIC ACID SERUM: CPT

## 2020-12-17 PROCEDURE — 99396 PREV VISIT EST AGE 40-64: CPT | Performed by: INTERNAL MEDICINE

## 2020-12-17 PROCEDURE — 83036 HEMOGLOBIN GLYCOSYLATED A1C: CPT

## 2020-12-17 PROCEDURE — 83540 ASSAY OF IRON: CPT

## 2020-12-17 PROCEDURE — 84155 ASSAY OF PROTEIN SERUM: CPT

## 2020-12-17 PROCEDURE — 85045 AUTOMATED RETICULOCYTE COUNT: CPT

## 2020-12-17 PROCEDURE — 82728 ASSAY OF FERRITIN: CPT

## 2020-12-17 RX ORDER — ATORVASTATIN CALCIUM 20 MG/1
20 TABLET, FILM COATED ORAL DAILY
Qty: 90 TAB | Refills: 3 | Status: SHIPPED | OUTPATIENT
Start: 2020-12-17 | End: 2021-12-01 | Stop reason: DRUGHIGH

## 2020-12-17 NOTE — Clinical Note
W labs Patient denies CP, SOB Review of systems otherwise (-)    acetaminophen   Tablet .. 650 milliGRAM(s) Oral every 6 hours PRN  ALBUTerol    90 MICROgram(s) HFA Inhaler 2 Puff(s) Inhalation every 6 hours PRN  aMIOdarone    Tablet 200 milliGRAM(s) Oral daily  aspirin enteric coated 81 milliGRAM(s) Oral daily  calcium acetate 667 milliGRAM(s) Oral three times a day with meals  chlorhexidine 2% Cloths 1 Application(s) Topical daily  dextrose 5%. 1000 milliLiter(s) IV Continuous <Continuous>  glycopyrrolate 9 MICROgram(s)/formoterol 4.8 MICROgram(s) Inhaler 2 Puff(s) Inhalation two times a day  insulin glargine Injectable (LANTUS) 10 Unit(s) SubCutaneous at bedtime  insulin lispro (HumaLOG) corrective regimen sliding scale   SubCutaneous three times a day before meals  insulin lispro (HumaLOG) corrective regimen sliding scale   SubCutaneous at bedtime  insulin lispro Injectable (HumaLOG) 3 Unit(s) SubCutaneous three times a day before meals  ketotifen 0.025% Ophthalmic Solution 1 Drop(s) Both EYES two times a day  methimazole 5 milliGRAM(s) Oral daily  metoprolol tartrate 100 milliGRAM(s) Oral two times a day  pantoprazole    Tablet 40 milliGRAM(s) Oral before breakfast  simvastatin 40 milliGRAM(s) Oral at bedtime  tiotropium 18 MICROgram(s) Capsule 1 Capsule(s) Inhalation daily  traMADol 25 milliGRAM(s) Oral every 12 hours PRN                            13.4   8.13  )-----------( 207      ( 24 Jun 2020 06:46 )             42.2       Hemoglobin: 13.4 g/dL (06-24 @ 06:46)  Hemoglobin: 13.4 g/dL (06-23 @ 07:31)  Hemoglobin: 13.1 g/dL (06-22 @ 06:12)  Hemoglobin: 12.5 g/dL (06-21 @ 06:17)  Hemoglobin: 12.7 g/dL (06-20 @ 11:56)      06-24    131<L>  |  91<L>  |  64<H>  ----------------------------<  99  4.6   |  23  |  8.82<H>    Ca    9.0      24 Jun 2020 06:46  Phos  5.9     06-24  Mg     2.0     06-24    TPro  7.4  /  Alb  3.1<L>  /  TBili  0.6  /  DBili  x   /  AST  26  /  ALT  30  /  AlkPhos  73  06-24    Creatinine Trend: 8.82<--, 10.60<--, 9.06<--, 6.66<--, 8.05<--, 5.99<--    COAGS: PT/INR - ( 24 Jun 2020 06:46 )   PT: 48.5 sec;   INR: 4.11 ratio         PTT - ( 24 Jun 2020 06:46 )  PTT:34.6 sec          T(C): 37.2 (06-24-20 @ 07:54), Max: 37.2 (06-24-20 @ 07:54)  HR: 76 (06-24-20 @ 07:54) (70 - 85)  BP: 122/75 (06-24-20 @ 07:54) (122/75 - 134/64)  RR: 18 (06-24-20 @ 07:54) (18 - 20)  SpO2: 93% (06-24-20 @ 07:54) (93% - 97%)  Wt(kg): --    I&O's Summary    Gen: Appears well in NAD  HEENT:  (-)icterus (-)pallor  CV: N S1 S2 1/6 DEJAH (+)2 Pulses B/l  Resp:  Clear to ausculatation B/L, normal effort  GI: (+) BS Soft, NT, ND  Lymph:  (+)Edema, (-)obvious lymphadenopathy  Skin: Warm to touch, Normal turgor  Psych: Appropriate mood and affect      ASSESSMENT/PLAN: 69y Male  CAD s/p multiple stents, s/p CABG (9/2019 course c/b Afib and LT pleural effusion), Normal LV systolic function, severe Diastolic, RV dysfx CHF, HTN, HLD, DM2, Afib on coumadin and ESRD on HD (Tu, Thurs, Sat at [facility]), hyperthyroidism, presented to ER for pain and swelling of left leg since 3 days.    - LE dopplers negative for dVT   - Continue with coumadin for goal INR 2-3    - continue with fluid removal with HD  - HD per renal  - f/u echo done this AM    Murphy Colin MD, [facility]  BEEPER [phone]

## 2020-12-18 LAB
ALBUMIN SERPL ELPH-MCNC: 3.6 G/DL (ref 2.9–4.4)
ALBUMIN/GLOB SERPL: 1.6 {RATIO} (ref 0.7–1.7)
ALPHA1 GLOB SERPL ELPH-MCNC: 0.2 G/DL (ref 0–0.4)
ALPHA2 GLOB SERPL ELPH-MCNC: 0.7 G/DL (ref 0.4–1)
B-GLOBULIN SERPL ELPH-MCNC: 0.9 G/DL (ref 0.7–1.3)
GAMMA GLOB SERPL ELPH-MCNC: 0.5 G/DL (ref 0.4–1.8)
GLOBULIN SER CALC-MCNC: 2.2 G/DL (ref 2.2–3.9)
M PROTEIN SERPL ELPH-MCNC: ABNORMAL G/DL
PROT SERPL-MCNC: 5.8 G/DL (ref 6–8.5)

## 2020-12-19 ENCOUNTER — TELEPHONE (OUTPATIENT)
Dept: INTERNAL MEDICINE CLINIC | Age: 55
End: 2020-12-19

## 2020-12-19 NOTE — TELEPHONE ENCOUNTER
Results for orders placed or performed during the hospital encounter of 12/17/20   PROTEIN ELECTROPHORESIS   Result Value Ref Range    Protein, total 5.8 (L) 6.0 - 8.5 g/dL    Albumin 3.6 2.9 - 4.4 g/dL    Alpha-1-globulin 0.2 0.0 - 0.4 g/dL    ALPHA-2 GLOBULIN 0.7 0.4 - 1.0 g/dL    Beta globulin 0.9 0.7 - 1.3 g/dL    Gamma globulin 0.5 0.4 - 1.8 g/dL    M-Brayden Not Observed Not Observed g/dL    Globulin, total 2.2 2.2 - 3.9 g/dL    A/G ratio 1.6 0.7 - 1.7     FERRITIN   Result Value Ref Range    Ferritin 169 8 - 388 NG/ML   RETICULOCYTE COUNT   Result Value Ref Range    Reticulocyte count 1.3 0.5 - 2.3 %   HEMOGLOBIN A1C W/O EAG   Result Value Ref Range    Hemoglobin A1c 5.3 4.2 - 5.6 %   VITAMIN B12 & FOLATE   Result Value Ref Range    Vitamin B12 424 211 - 911 pg/mL    Folate 13.8 3.10 - 17.50 ng/mL   IRON PROFILE   Result Value Ref Range    Iron 64 50 - 175 ug/dL    TIBC 310 250 - 450 ug/dL    Iron % saturation 21 20 - 50 %     pls call   All normal  Will recheck cbc next visit

## 2021-02-24 ENCOUNTER — HOSPITAL ENCOUNTER (OUTPATIENT)
Dept: LAB | Age: 56
Discharge: HOME | End: 2021-02-24
Payer: COMMERCIAL

## 2021-02-24 LAB
BUN SERPL-MCNC: 27 MG/DL (ref 7–18)
CREAT SERPL-MCNC: 2.93 MG/DL (ref 0.6–1.3)
POTASSIUM SERPL-SCNC: 4.2 MMOL/L (ref 3.5–5.5)

## 2021-02-24 PROCEDURE — 84520 ASSAY OF UREA NITROGEN: CPT

## 2021-02-24 PROCEDURE — 36415 COLL VENOUS BLD VENIPUNCTURE: CPT

## 2021-02-24 PROCEDURE — 82565 ASSAY OF CREATININE: CPT

## 2021-02-24 PROCEDURE — 84132 ASSAY OF SERUM POTASSIUM: CPT

## 2021-06-14 ENCOUNTER — TELEPHONE (OUTPATIENT)
Dept: INTERNAL MEDICINE CLINIC | Age: 56
End: 2021-06-14

## 2021-06-14 DIAGNOSIS — N18.31 CHRONIC KIDNEY DISEASE (CKD) STAGE G3A/A3, MODERATELY DECREASED GLOMERULAR FILTRATION RATE (GFR) BETWEEN 45-59 ML/MIN/1.73 SQUARE METER AND ALBUMINURIA CREATININE RATIO GREATER THAN 300 MG/G (HCC): Primary | ICD-10-CM

## 2021-06-17 ENCOUNTER — HOSPITAL ENCOUNTER (OUTPATIENT)
Dept: LAB | Age: 56
Discharge: HOME | End: 2021-06-17
Payer: COMMERCIAL

## 2021-06-17 ENCOUNTER — APPOINTMENT (OUTPATIENT)
Dept: INTERNAL MEDICINE CLINIC | Age: 56
End: 2021-06-17

## 2021-06-17 DIAGNOSIS — E78.5 DYSLIPIDEMIA: ICD-10-CM

## 2021-06-17 DIAGNOSIS — R73.01 IFG (IMPAIRED FASTING GLUCOSE): ICD-10-CM

## 2021-06-17 LAB
ALBUMIN SERPL-MCNC: 3.6 G/DL (ref 3.4–5)
ALBUMIN/GLOB SERPL: 1.4 {RATIO} (ref 0.8–1.7)
ALP SERPL-CCNC: 141 U/L (ref 45–117)
ALT SERPL-CCNC: 42 U/L (ref 16–61)
AST SERPL-CCNC: 26 U/L (ref 10–38)
BILIRUB DIRECT SERPL-MCNC: <0.1 MG/DL (ref 0–0.2)
BILIRUB SERPL-MCNC: 0.3 MG/DL (ref 0.2–1)
CHOLEST SERPL-MCNC: 208 MG/DL
GLOBULIN SER CALC-MCNC: 2.5 G/DL (ref 2–4)
HBA1C MFR BLD: 5.1 % (ref 4.2–5.6)
HDLC SERPL-MCNC: 45 MG/DL (ref 40–60)
HDLC SERPL: 4.6 {RATIO} (ref 0–5)
LDLC SERPL CALC-MCNC: 101 MG/DL (ref 0–100)
LIPID PROFILE,FLP: ABNORMAL
PROT SERPL-MCNC: 6.1 G/DL (ref 6.4–8.2)
TRIGL SERPL-MCNC: 310 MG/DL (ref ?–150)
VLDLC SERPL CALC-MCNC: 62 MG/DL

## 2021-06-17 PROCEDURE — 80061 LIPID PANEL: CPT

## 2021-06-17 PROCEDURE — 80076 HEPATIC FUNCTION PANEL: CPT

## 2021-06-17 PROCEDURE — 83036 HEMOGLOBIN GLYCOSYLATED A1C: CPT

## 2021-06-21 NOTE — PROGRESS NOTES
48 y.o. white male who presents for f/u    No cardiovascular complaints. He has not gone back to [facility] but is doing some light weights and walking 30 min daily at home    Continuing to lose weight as portion control and food choices continue to improve     Vitals 6/24/2021 12/17/2020 12/9/2019 2/19/2019   Weight 200 lb 215 lb 224 lb 214 lb     No gi or gu complaints. No new psych issues, continues to see Dr Fercho Chi and no new meds. Dr. La Quiles sees him yearly but he is asking for 2nd opinion from diff nephrologist     Remains on cpap and doing well    No pro on inc dosing of lipitor    Past Medical History:   Diagnosis Date    Back pain     Bipolar disorder ([address]     Dr. Tracy Briones since 1984    CKD (chronic kidney disease) 2007    Lithium induced, Dr. La Quiles    Colon adenoma     and diverticulosis 7/15 Dr Michelle Perez    Degenerative arthritis of spine 01/2016    on t and l spine films YOBANI    Dyslipidemia     calculated 10 yr risk score was 5.9% (4/14); 5.4% (11/14); 4.3% (5/15); 6.7% (5/16); 7.3% (11/16)    Erectile dysfunction 8/10/2017    Hypertension     Lumbar radiculitis 2020    on emg Dr Paz Carrillo Myofascial pain 2016    Dr Sia Morris    Obesity     IF 11/18 start weight 222 lbs    OTIS on CPAP     Dr Irene Martinez Proteinuria     minimal 2011    Subacromial bursitis     right Dr. Mari Naylor     Past Surgical History:   Procedure Laterality Date   Brandy Perez 7/15 adenoma and divertics; Dr Dani Valladares 2/25/21 divetics and adenomas    PA CARDIAC SURG PROCEDURE UNLIST      ETT negative 2004, 2009     Social History     Socioeconomic History    Marital status:      Spouse name: Not on file    Number of children: 1    Years of education: Not on file    Highest education level: Not on file   Occupational History    Occupation: [address]   Tobacco Use    Smoking status: Never Smoker    Smokeless tobacco: Never Used   Substance and Sexual Activity    Alcohol use:  Yes Comment: social    Drug use: No    Sexual activity: Not Currently   Other Topics Concern    Not on file   Social History Narrative    Not on file     Social Determinants of Health     Financial Resource Strain:     Difficulty of Paying Living Expenses:    Food Insecurity:     Worried About Running Out of Food in the Last Year:     [address] in the Last Year:    Transportation Needs:     Lack of Transportation (Medical):  Lack of Transportation (Non-Medical):    Physical Activity:     Days of Exercise per Week:     Minutes of Exercise per Session:    Stress:     Feeling of Stress :    Social Connections:     Frequency of Communication with Friends and Family:     Frequency of Social Gatherings with Friends and Family:     Attends [employer] Services:     Active Member of Clubs or Organizations:     Attends Club or Organization Meetings:     Marital Status:    Intimate Partner Violence:     Fear of Current or Ex-Partner:     Emotionally Abused:     Physically Abused:     Sexually Abused:      Current Outpatient Medications   Medication Sig    atorvastatin (LIPITOR) 20 mg tablet Take 1 Tab by mouth daily.  OXcarbazepine (TRILEPTAL) 300 mg tablet Take  by mouth.  OLANZapine (ZYPREXA) 10 mg tablet Take 10 mg by mouth nightly.  polyethylene glycol (MIRALAX) 17 gram packet Take 17 g by mouth daily.  Cholecalciferol, Vitamin D3, (VITAMIN D) 2,000 unit Cap Take  by mouth.  lamoTRIgine (LAMICTAL) 100 mg tablet Take 100 mg by mouth as directed. Pt takes 100mg in the am and 200mg in the pm        sildenafil citrate (VIAGRA) 100 mg tablet Take 1 Tab by mouth as needed. (Patient not taking: Reported on 6/24/2021)    losartan (COZAAR) 100 mg tablet Take 50 mg by mouth daily. (Patient not taking: Reported on 6/24/2021)    multivitamin (ONE A DAY) tablet Take 1 Tab by mouth daily.  (Patient not taking: Reported on 6/24/2021)     No current facility-administered medications for this visit. No Known Allergies    REVIEW OF SYSTEMS: colo 2/21 Dr Gaviota Laureano  Ophtho  no vision change or eye pain  Oral  no mouth pain, tongue or tooth problems  Ears  no hearing loss, ear pain, fullness, no swallowing problems  Cardiac  no CP, PND, orthopnea, edema, palpitations or syncope  Chest  no breast masses  Resp  no wheezing, chronic coughing, dyspnea  GI  no heartburn, nausea, vomiting, change in bowel habits, bleeding, hemorrhoids  Urinary  no dysuria, hematuria, flank pain, urgency, frequency    Visit Vitals  /82   Pulse 60   Temp 97.5 °F (36.4 °C) (Temporal)   Resp 12   Ht 5' 9\" (1.753 m)   Wt 200 lb (90.7 kg)   SpO2 100%   BMI 29.53 kg/m²   A&O x3  Affect is appropriate. Mood stable  No apparent distress  Anicteric, no JVD, adenopathy or thyromegaly. No carotid bruits or radiated murmur  Lungs clear to auscultation, no wheezes or rales  Heart showed regular rate and rhythm. No murmur, rubs, gallops  Abdomen soft nontender, no hepatosplenomegaly or masses. Extremities without edema.   Pulses 1-2+ symmetrically    LABS  From 11/09 showed gluc 102, cr 1.80, gfr 44,          chol 215, tg 350, hdl 34, ldl-c 111  From 1/10 showed       alt 30,         chol 200, tg 240, hdl 33, ldl-c 119  From 6/11 showed   gluc 86,   cr 2.30,    alt 17,         chol 198, tg 134, hdl 38, ldl-c 133, wbc 2.8, hb 12.4, plt 190, psa 0.4  From 12/11 showed gluc 95,   cr 2.01, gfr 39,   ldl-p 1438, chol 201, tg 144, hdl 48, ldl-c 124,              24 hr Upr 474  From 6/12 showed       ldl-p 1611, chol 228, tg 231, hdl 39, ldl-c 143  From 5/14 showed   gluc 96,   cr 1.95, gfr 37, alt 13,         chol 242, tg 450, hdl 32, ldl-c na,   wbc 6.1, hb 13.2, plt 229, psa 0.30, tsh 1.83,  vit d 34.7  From 11/14 showed              chol 250, tg 340, hdl 35, ldl-c 147  From 5/15 showed   gluc 97,   cr 2.29, gfr 31,   hba1c 5.7, chol 215, tg 255, hdl 36, ldl-c 128  From 11/15 showed gluc 104, cr 2.14, gfr 35,   hba1c 5.6, tsh 1.62  From 5/16 showed   gluc 92,   cr 2.11, gfr 34,          chol 252, tg 438, hdl 35, ldl-c na,   wbc 5.2, hb 13.6, plt 233  From 11/16 showed       hba1c 5.1, chol 254, tg 321, hdl 39, ldl-c 151  From 11/17 showed gluc 90,   cr 2.31, gfr 32, alt 24,         chol 278, tg 342, hdl 38, ldl-c 182, wbc 6.3, hb 13.6, plt 253  From 11/18 showed gluc 102, cr 2.44, gfr 28, alt 36,         chol 250, tg 436, hdl 37, ldl-c na,   wbc 5.9, hb 12.7, plt 268, psa 0.40  From 5/19 showed                                   24h Upr 1887  From 9/19 showed                           24h Upr 2237  From 11/19 showed gluc 105, cr 2.83, gfr 24, alt 32,         chol 297, tg 330, hdl 39, ldl-c 192, wbc 6.0, hb 12.5, plt 270  From 12/20 showed gluc 87,   cr 3.30, gfr 20, alt 23, hba1c 5.3, chol 231, tg 362, hdl 41, ldl-c 118, wbc 6.6, hb 12.3, plt 274,             fe 64, %sat 21, ferritin 169, b12 424, fol 13.8  From 2/21 showed     cr 2.93    Results for orders placed or performed during the hospital encounter of 06/17/21   HEMOGLOBIN A1C W/O EAG   Result Value Ref Range    Hemoglobin A1c 5.1 4.2 - 5.6 %   LIPID PANEL   Result Value Ref Range    LIPID PROFILE          Cholesterol, total 208 (H) <200 MG/DL    Triglyceride 310 (H) <150 MG/DL    HDL Cholesterol 45 40 - 60 MG/DL    LDL, calculated 101 (H) 0 - 100 MG/DL    VLDL, calculated 62 MG/DL    CHOL/HDL Ratio 4.6 0 - 5.0     HEPATIC FUNCTION PANEL   Result Value Ref Range    Protein, total 6.1 (L) 6.4 - 8.2 g/dL    Albumin 3.6 3.4 - 5.0 g/dL    Globulin 2.5 2.0 - 4.0 g/dL    A-G Ratio 1.4 0.8 - 1.7      Bilirubin, total 0.3 0.2 - 1.0 MG/DL    Bilirubin, direct <0.1 0.0 - 0.2 MG/DL    Alk.  phosphatase 141 (H) 45 - 117 U/L    AST (SGOT) 26 10 - 38 U/L    ALT (SGPT) 42 16 - 61 U/L     We reviewed the patient's labs from the last several visits to point out trends in the numbers            Patient Active Problem List   Diagnosis Code    Bipolar disorder Dr. Gomez Pap F31.9    Dyslipidemia E78.5    Primary hypertension I10    OTIS on CPAP Dr Colton Hendrix G47.33, Z99.89    Colon adenoma 7/15 Dr Mattie Norwood D12.6    IFG (impaired fasting glucose) R73.01    Obesity (BMI 30-39. 9) E66.9    Chronic kidney disease (CKD) stage G3a/A3 Dr Kenyatta Shah N18.31    Erectile dysfunction N52.9     Assessment and plan:  1. BPD. Continue current and f/u Dr. Phil Whitaker  2. CRI and proteinuria. Continue cozaar and will refer to Dr Mary Gandhi  3. Dyslipidemia. Will continue current, declined icosapent concerned about cost  4. HTN. On ARB  5. Obesity. Congratulated him on his continued success. Continue lifestyle and dietary measures, portion control  6. IFG. Improved with the wt loss  7. OTIS on cpap. F/U Dr Colton Hendrix  8. Colon adenoma. Fiber, colo 2020  9. Anemia. Stable and follow  10. PVX given  11. In passing, he c/o seborrhea so ketoconazole given    RTC 12/21    Above conditions discussed at length and patient vocalized understanding.   All questions answered to patient satisfaction

## 2021-06-24 ENCOUNTER — HOSPITAL ENCOUNTER (OUTPATIENT)
Dept: LAB | Age: 56
Discharge: HOME | End: 2021-06-24
Payer: COMMERCIAL

## 2021-06-24 ENCOUNTER — OFFICE VISIT (OUTPATIENT)
Dept: INTERNAL MEDICINE CLINIC | Age: 56
End: 2021-06-24
Payer: COMMERCIAL

## 2021-06-24 VITALS
WEIGHT: 200 LBS | SYSTOLIC BLOOD PRESSURE: 128 MMHG | RESPIRATION RATE: 12 BRPM | BODY MASS INDEX: 29.62 KG/M2 | OXYGEN SATURATION: 100 % | HEART RATE: 60 BPM | HEIGHT: 69 IN | TEMPERATURE: 97.5 F | DIASTOLIC BLOOD PRESSURE: 82 MMHG

## 2021-06-24 DIAGNOSIS — F31.78 BIPOLAR DISORDER, IN FULL REMISSION, MOST RECENT EPISODE MIXED (HCC): ICD-10-CM

## 2021-06-24 DIAGNOSIS — E78.5 DYSLIPIDEMIA: ICD-10-CM

## 2021-06-24 DIAGNOSIS — Z11.59 NEED FOR HEPATITIS C SCREENING TEST: ICD-10-CM

## 2021-06-24 DIAGNOSIS — G47.33 OSA ON CPAP: ICD-10-CM

## 2021-06-24 DIAGNOSIS — R73.01 IFG (IMPAIRED FASTING GLUCOSE): ICD-10-CM

## 2021-06-24 DIAGNOSIS — Z99.89 OSA ON CPAP: ICD-10-CM

## 2021-06-24 DIAGNOSIS — D12.6 COLON ADENOMA: ICD-10-CM

## 2021-06-24 DIAGNOSIS — Z23 ENCOUNTER FOR IMMUNIZATION: ICD-10-CM

## 2021-06-24 DIAGNOSIS — N18.31 CHRONIC KIDNEY DISEASE (CKD) STAGE G3A/A3, MODERATELY DECREASED GLOMERULAR FILTRATION RATE (GFR) BETWEEN 45-59 ML/MIN/1.73 SQUARE METER AND ALBUMINURIA CREATININE RATIO GREATER THAN 300 MG/G (HCC): ICD-10-CM

## 2021-06-24 DIAGNOSIS — N18.9 CHRONIC KIDNEY DISEASE, UNSPECIFIED CKD STAGE: ICD-10-CM

## 2021-06-24 DIAGNOSIS — E66.9 OBESITY (BMI 30-39.9): ICD-10-CM

## 2021-06-24 DIAGNOSIS — N18.9 CHRONIC KIDNEY DISEASE, UNSPECIFIED CKD STAGE: Primary | ICD-10-CM

## 2021-06-24 DIAGNOSIS — L21.9 SEBORRHEA: ICD-10-CM

## 2021-06-24 DIAGNOSIS — I10 PRIMARY HYPERTENSION: ICD-10-CM

## 2021-06-24 LAB
ANION GAP SERPL CALC-SCNC: 7 MMOL/L (ref 3–18)
BASOPHILS # BLD: 0 K/UL (ref 0–0.1)
BASOPHILS NFR BLD: 1 % (ref 0–2)
BUN SERPL-MCNC: 38 MG/DL (ref 7–18)
BUN/CREAT SERPL: 12 (ref 12–20)
CALCIUM SERPL-MCNC: 9.2 MG/DL (ref 8.5–10.1)
CHLORIDE SERPL-SCNC: 109 MMOL/L (ref 100–111)
CO2 SERPL-SCNC: 27 MMOL/L (ref 21–32)
CREAT SERPL-MCNC: 3.2 MG/DL (ref 0.6–1.3)
DIFFERENTIAL METHOD BLD: ABNORMAL
EOSINOPHIL # BLD: 0.1 K/UL (ref 0–0.4)
EOSINOPHIL NFR BLD: 2 % (ref 0–5)
ERYTHROCYTE [DISTWIDTH] IN BLOOD BY AUTOMATED COUNT: 13.8 % (ref 11.6–14.5)
GLUCOSE SERPL-MCNC: 95 MG/DL (ref 74–99)
HCT VFR BLD AUTO: 38.5 % (ref 36–48)
HGB BLD-MCNC: 12 G/DL (ref 13–16)
LYMPHOCYTES # BLD: 0.9 K/UL (ref 0.9–3.6)
LYMPHOCYTES NFR BLD: 17 % (ref 21–52)
MCH RBC QN AUTO: 26.6 PG (ref 24–34)
MCHC RBC AUTO-ENTMCNC: 31.2 G/DL (ref 31–37)
MCV RBC AUTO: 85.4 FL (ref 74–97)
MONOCYTES # BLD: 0.4 K/UL (ref 0.05–1.2)
MONOCYTES NFR BLD: 7 % (ref 3–10)
NEUTS SEG # BLD: 4 K/UL (ref 1.8–8)
NEUTS SEG NFR BLD: 73 % (ref 40–73)
PLATELET # BLD AUTO: 269 K/UL (ref 135–420)
PMV BLD AUTO: 10.2 FL (ref 9.2–11.8)
POTASSIUM SERPL-SCNC: 4.9 MMOL/L (ref 3.5–5.5)
RBC # BLD AUTO: 4.51 M/UL (ref 4.35–5.65)
SODIUM SERPL-SCNC: 143 MMOL/L (ref 136–145)
WBC # BLD AUTO: 5.5 K/UL (ref 4.6–13.2)

## 2021-06-24 PROCEDURE — 85025 COMPLETE CBC W/AUTO DIFF WBC: CPT

## 2021-06-24 PROCEDURE — 90732 PPSV23 VACC 2 YRS+ SUBQ/IM: CPT | Performed by: INTERNAL MEDICINE

## 2021-06-24 PROCEDURE — 90471 IMMUNIZATION ADMIN: CPT | Performed by: INTERNAL MEDICINE

## 2021-06-24 PROCEDURE — 99214 OFFICE O/P EST MOD 30 MIN: CPT | Performed by: INTERNAL MEDICINE

## 2021-06-24 PROCEDURE — 80048 BASIC METABOLIC PNL TOTAL CA: CPT

## 2021-06-24 RX ORDER — KETOCONAZOLE 20 MG/ML
5 SHAMPOO TOPICAL AS NEEDED
Qty: 1 BOTTLE | Refills: 5 | Status: SHIPPED | OUTPATIENT
Start: 2021-06-24

## 2021-06-24 NOTE — PROGRESS NOTES
Basshaylie Jonas presents today for   Chief Complaint   Patient presents with    Follow-up     6 month    Hypertension    Cholesterol Problem              Coordination of Care:  1. Have you been to the ER, urgent care clinic since your last visit? Hospitalized since your last visit? YES    2. Have you seen or consulted any other health care providers outside of the [address] [place] [address] since your last visit? Include any pap smears or colon screening.  YES

## 2021-08-17 ENCOUNTER — TRANSCRIBE ORDER (OUTPATIENT)
Dept: SCHEDULING | Age: 56
End: 2021-08-17

## 2021-08-17 DIAGNOSIS — N18.4 CKD (CHRONIC KIDNEY DISEASE), STAGE IV (HCC): Primary | ICD-10-CM

## 2021-09-20 ENCOUNTER — HOSPITAL ENCOUNTER (OUTPATIENT)
Dept: LAB | Age: 56
Discharge: HOME | End: 2021-09-20
Payer: COMMERCIAL

## 2021-09-20 ENCOUNTER — HOSPITAL ENCOUNTER (OUTPATIENT)
Dept: ULTRASOUND IMAGING | Age: 56
Discharge: HOME | End: 2021-09-20
Attending: INTERNAL MEDICINE
Payer: COMMERCIAL

## 2021-09-20 DIAGNOSIS — N18.4 CKD (CHRONIC KIDNEY DISEASE), STAGE IV (HCC): ICD-10-CM

## 2021-09-20 LAB
ALBUMIN SERPL-MCNC: 3.4 G/DL (ref 3.4–5)
ANION GAP SERPL CALC-SCNC: 5 MMOL/L (ref 3–18)
APPEARANCE UR: CLEAR
BACTERIA URNS QL MICRO: NEGATIVE /HPF
BILIRUB UR QL: NEGATIVE
BUN SERPL-MCNC: 35 MG/DL (ref 7–18)
BUN/CREAT SERPL: 10 (ref 12–20)
CALCIUM SERPL-MCNC: 8.7 MG/DL (ref 8.5–10.1)
CHLORIDE SERPL-SCNC: 110 MMOL/L (ref 100–111)
CO2 SERPL-SCNC: 27 MMOL/L (ref 21–32)
COLOR UR: YELLOW
CREAT SERPL-MCNC: 3.52 MG/DL (ref 0.6–1.3)
CREAT UR-MCNC: 28 MG/DL (ref 30–125)
EPITH CASTS URNS QL MICRO: NORMAL /LPF (ref 0–5)
ERYTHROCYTE [DISTWIDTH] IN BLOOD BY AUTOMATED COUNT: 13.4 % (ref 11.6–14.5)
GLUCOSE SERPL-MCNC: 106 MG/DL (ref 74–99)
GLUCOSE UR STRIP.AUTO-MCNC: NEGATIVE MG/DL
HCT VFR BLD AUTO: 34.8 % (ref 36–48)
HGB BLD-MCNC: 11.1 G/DL (ref 13–16)
HGB UR QL STRIP: NEGATIVE
KETONES UR QL STRIP.AUTO: NEGATIVE MG/DL
LEUKOCYTE ESTERASE UR QL STRIP.AUTO: NEGATIVE
MCH RBC QN AUTO: 27.2 PG (ref 24–34)
MCHC RBC AUTO-ENTMCNC: 31.9 G/DL (ref 31–37)
MCV RBC AUTO: 85.3 FL (ref 78–100)
NITRITE UR QL STRIP.AUTO: NEGATIVE
PH UR STRIP: 6 [PH] (ref 5–8)
PHOSPHATE SERPL-MCNC: 4.6 MG/DL (ref 2.5–4.9)
PLATELET # BLD AUTO: 276 K/UL (ref 135–420)
PMV BLD AUTO: 9.5 FL (ref 9.2–11.8)
POTASSIUM SERPL-SCNC: 4.5 MMOL/L (ref 3.5–5.5)
PROT UR STRIP-MCNC: 300 MG/DL
PROT UR-MCNC: 140 MG/DL
PROT/CREAT UR-RTO: 5
RBC # BLD AUTO: 4.08 M/UL (ref 4.35–5.65)
RBC #/AREA URNS HPF: NORMAL /HPF (ref 0–5)
SODIUM SERPL-SCNC: 142 MMOL/L (ref 136–145)
SP GR UR REFRACTOMETRY: 1.01 (ref 1–1.03)
UROBILINOGEN UR QL STRIP.AUTO: 0.2 EU/DL (ref 0.2–1)
WBC # BLD AUTO: 5.7 K/UL (ref 4.6–13.2)
WBC URNS QL MICRO: NORMAL /HPF (ref 0–4)

## 2021-09-20 PROCEDURE — 84156 ASSAY OF PROTEIN URINE: CPT

## 2021-09-20 PROCEDURE — 85027 COMPLETE CBC AUTOMATED: CPT

## 2021-09-20 PROCEDURE — 81001 URINALYSIS AUTO W/SCOPE: CPT

## 2021-09-20 PROCEDURE — 76770 US EXAM ABDO BACK WALL COMP: CPT

## 2021-09-20 PROCEDURE — 36415 COLL VENOUS BLD VENIPUNCTURE: CPT

## 2021-09-20 PROCEDURE — 80069 RENAL FUNCTION PANEL: CPT

## 2021-09-20 PROCEDURE — 82570 ASSAY OF URINE CREATININE: CPT

## 2021-09-21 ENCOUNTER — HOSPITAL ENCOUNTER (OUTPATIENT)
Dept: LAB | Age: 56
Discharge: HOME | End: 2021-09-21
Payer: COMMERCIAL

## 2021-09-21 LAB
COLLECT DURATION TIME UR: 24 HR
COLLECT DURATION TIME UR: 24 HR
CREAT 24H UR-MRATE: 720 MG/24HR (ref 600–2500)
PROT 24H UR-MRATE: 3870 MG/24HR
SPECIMEN VOL ?TM UR: 3000 ML
SPECIMEN VOL ?TM UR: 3000 ML

## 2021-09-21 PROCEDURE — 83935 ASSAY OF URINE OSMOLALITY: CPT

## 2021-09-21 PROCEDURE — 36415 COLL VENOUS BLD VENIPUNCTURE: CPT

## 2021-09-22 LAB — OSMOLALITY UR: 145 MOSMOL/KG

## 2021-09-29 ENCOUNTER — TRANSCRIBE ORDER (OUTPATIENT)
Dept: INTERVENTIONAL RADIOLOGY/VASCULAR | Age: 56
End: 2021-09-29

## 2021-09-29 DIAGNOSIS — N18.4 CKD (CHRONIC KIDNEY DISEASE), STAGE IV (HCC): Primary | ICD-10-CM

## 2021-09-30 NOTE — PROGRESS NOTES
Spoke to patient to schedule appointment for medical renal biopsy. Verified antigoagulation use and pt denies. Instructed to be NPO at midnight. Instructed to arrive at 0700 for 0830 appointment. Pt will come to get his Covid-19 test done on either the 21st or 22nd of Oct. --- Pt informed RN that he was covid positive Aug 25th and has since recovered. Pt was concerned if his test comes back positive again how would it affect his procedure. RN informed him that the information will be passed along to the departments involved and will determine course of action when needed. Provided opportunity for questions and all were answered. Pt given RN contact information for any future questions.

## 2021-10-20 ENCOUNTER — HOSPITAL ENCOUNTER (OUTPATIENT)
Dept: PREADMISSION TESTING | Age: 56
Discharge: HOME | End: 2021-10-20
Payer: COMMERCIAL

## 2021-10-20 PROCEDURE — U0003 INFECTIOUS AGENT DETECTION BY NUCLEIC ACID (DNA OR RNA); SEVERE ACUTE RESPIRATORY SYNDROME CORONAVIRUS 2 (SARS-COV-2) (CORONAVIRUS DISEASE [COVID-19]), AMPLIFIED PROBE TECHNIQUE, MAKING USE OF HIGH THROUGHPUT TECHNOLOGIES AS DESCRIBED BY CMS-2020-01-R: HCPCS

## 2021-10-21 LAB — SARS-COV-2, NAA: NOT DETECTED

## 2021-10-25 ENCOUNTER — HOSPITAL ENCOUNTER (OUTPATIENT)
Dept: CT IMAGING | Age: 56
Discharge: HOME | End: 2021-10-25
Attending: INTERNAL MEDICINE | Admitting: RADIOLOGY
Payer: COMMERCIAL

## 2021-10-25 VITALS
SYSTOLIC BLOOD PRESSURE: 137 MMHG | HEART RATE: 76 BPM | TEMPERATURE: 98.7 F | BODY MASS INDEX: 28.23 KG/M2 | DIASTOLIC BLOOD PRESSURE: 84 MMHG | OXYGEN SATURATION: 97 % | HEIGHT: 69 IN | WEIGHT: 190.6 LBS | RESPIRATION RATE: 16 BRPM

## 2021-10-25 DIAGNOSIS — N18.4 CKD (CHRONIC KIDNEY DISEASE), STAGE IV (HCC): ICD-10-CM

## 2021-10-25 LAB
ANION GAP SERPL CALC-SCNC: 4 MMOL/L (ref 3–18)
APTT PPP: 31.9 SEC (ref 23–36.4)
BUN SERPL-MCNC: 39 MG/DL (ref 7–18)
BUN/CREAT SERPL: 11 (ref 12–20)
CALCIUM SERPL-MCNC: 9.2 MG/DL (ref 8.5–10.1)
CHLORIDE SERPL-SCNC: 113 MMOL/L (ref 100–111)
CO2 SERPL-SCNC: 25 MMOL/L (ref 21–32)
CREAT SERPL-MCNC: 3.44 MG/DL (ref 0.6–1.3)
ERYTHROCYTE [DISTWIDTH] IN BLOOD BY AUTOMATED COUNT: 13.3 % (ref 11.6–14.5)
GLUCOSE SERPL-MCNC: 100 MG/DL (ref 74–99)
HCT VFR BLD AUTO: 36.3 % (ref 36–48)
HGB BLD-MCNC: 11.6 G/DL (ref 13–16)
INR PPP: 1 (ref 0.8–1.2)
MCH RBC QN AUTO: 26.7 PG (ref 24–34)
MCHC RBC AUTO-ENTMCNC: 32 G/DL (ref 31–37)
MCV RBC AUTO: 83.6 FL (ref 78–100)
PLATELET # BLD AUTO: 277 K/UL (ref 135–420)
PMV BLD AUTO: 9.5 FL (ref 9.2–11.8)
POTASSIUM SERPL-SCNC: 4.2 MMOL/L (ref 3.5–5.5)
PROTHROMBIN TIME: 12.8 SEC (ref 11.5–15.2)
RBC # BLD AUTO: 4.34 M/UL (ref 4.35–5.65)
SODIUM SERPL-SCNC: 142 MMOL/L (ref 136–145)
WBC # BLD AUTO: 5.7 K/UL (ref 4.6–13.2)

## 2021-10-25 PROCEDURE — 85027 COMPLETE CBC AUTOMATED: CPT

## 2021-10-25 PROCEDURE — 74011000250 HC RX REV CODE- 250: Performed by: RADIOLOGY

## 2021-10-25 PROCEDURE — 74011250636 HC RX REV CODE- 250/636: Performed by: INTERNAL MEDICINE

## 2021-10-25 PROCEDURE — 50200 RENAL BIOPSY PERQ: CPT

## 2021-10-25 PROCEDURE — 74011250637 HC RX REV CODE- 250/637: Performed by: PHYSICIAN ASSISTANT

## 2021-10-25 PROCEDURE — 85730 THROMBOPLASTIN TIME PARTIAL: CPT

## 2021-10-25 PROCEDURE — 88305 TISSUE EXAM BY PATHOLOGIST: CPT

## 2021-10-25 PROCEDURE — 80048 BASIC METABOLIC PNL TOTAL CA: CPT

## 2021-10-25 PROCEDURE — 85610 PROTHROMBIN TIME: CPT

## 2021-10-25 RX ORDER — NALOXONE HYDROCHLORIDE 0.4 MG/ML
0.1 INJECTION, SOLUTION INTRAMUSCULAR; INTRAVENOUS; SUBCUTANEOUS
Status: DISCONTINUED | OUTPATIENT
Start: 2021-10-25 | End: 2021-10-25 | Stop reason: HOSPADM

## 2021-10-25 RX ORDER — MIDAZOLAM HYDROCHLORIDE 1 MG/ML
.5-4 INJECTION, SOLUTION INTRAMUSCULAR; INTRAVENOUS
Status: DISCONTINUED | OUTPATIENT
Start: 2021-10-25 | End: 2021-10-25 | Stop reason: HOSPADM

## 2021-10-25 RX ORDER — LIDOCAINE HYDROCHLORIDE 10 MG/ML
1-20 INJECTION INFILTRATION; PERINEURAL
Status: DISCONTINUED | OUTPATIENT
Start: 2021-10-25 | End: 2021-10-25 | Stop reason: HOSPADM

## 2021-10-25 RX ORDER — FLUMAZENIL 0.1 MG/ML
0.2 INJECTION INTRAVENOUS
Status: DISCONTINUED | OUTPATIENT
Start: 2021-10-25 | End: 2021-10-25 | Stop reason: HOSPADM

## 2021-10-25 RX ORDER — SODIUM CHLORIDE 450 MG/100ML
20 INJECTION, SOLUTION INTRAVENOUS CONTINUOUS
Status: DISCONTINUED | OUTPATIENT
Start: 2021-10-25 | End: 2021-10-25 | Stop reason: HOSPADM

## 2021-10-25 RX ORDER — HYDROCODONE BITARTRATE AND ACETAMINOPHEN 5; 325 MG/1; MG/1
1-2 TABLET ORAL
Status: DISCONTINUED | OUTPATIENT
Start: 2021-10-25 | End: 2021-10-25 | Stop reason: HOSPADM

## 2021-10-25 RX ORDER — SODIUM CHLORIDE 9 MG/ML
20 INJECTION, SOLUTION INTRAVENOUS CONTINUOUS
Status: DISCONTINUED | OUTPATIENT
Start: 2021-10-25 | End: 2021-10-25 | Stop reason: HOSPADM

## 2021-10-25 RX ORDER — FENTANYL CITRATE 50 UG/ML
25-200 INJECTION, SOLUTION INTRAMUSCULAR; INTRAVENOUS
Status: DISCONTINUED | OUTPATIENT
Start: 2021-10-25 | End: 2021-10-25 | Stop reason: HOSPADM

## 2021-10-25 RX ADMIN — HYDROCODONE BITARTRATE AND ACETAMINOPHEN 1 TABLET: 5; 325 TABLET ORAL at 10:58

## 2021-10-25 RX ADMIN — SODIUM CHLORIDE 20 ML/HR: 900 INJECTION, SOLUTION INTRAVENOUS at 07:40

## 2021-10-25 RX ADMIN — LIDOCAINE HYDROCHLORIDE 7 ML: 10 INJECTION, SOLUTION INFILTRATION; PERINEURAL at 09:18

## 2021-10-25 NOTE — PROGRESS NOTES
Pt awake,  States pain gone,  Assisted up to restroom  Voided without incident,  States urine clear yellow.   Dressing to right flank area remains clean dry and intact

## 2021-10-25 NOTE — DISCHARGE INSTRUCTIONS
Patient Education        Needle Biopsy of the Kidney: What to Expect at Home  Your Recovery     A kidney biopsy is a test to take a sample (biopsy) of kidney. The doctor puts a long needle through your back (flank) into the kidney. Another doctor will look at the kidney tissue with a microscope to check for problems. After the test, you will be told to lie down on your back for several hours. After this, you should avoid strenuous activity for the next 2 to 3 days. It's normal to feel some soreness in the area of the biopsy for 2 to 3 days. You may have a small amount of bleeding on the bandage after the test. You may notice some blood in your urine after the test. This should go away within 12 to 24 hours. If it doesn't, call your doctor. This care sheet gives you a general idea about how long it will take for you to recover. But each person recovers at a different pace. Follow the steps below to feel better as quickly as possible. How can you care for yourself at home? Activity    · Avoid lifting anything that would make you strain. This may include heavy grocery bags and milk containers, a heavy briefcase or backpack, cat litter or dog food bags, a vacuum , or a child.     · Avoid strenuous activities, such as bicycle riding, jogging, weight lifting, or aerobic exercise, until your doctor says it is okay.     · Try to walk each day. Start by walking a little more than you did the day before. Bit by bit, increase the amount you walk. Walking boosts blood flow and helps prevent pneumonia and constipation.     · Rest when you feel tired. Getting enough sleep will help you recover.     · Ask your doctor when it is okay for you to have sex. Diet    · You can eat your normal diet. If your stomach is upset, try bland, low-fat foods like plain rice, broiled chicken, toast, and yogurt.     · Drink plenty of fluids to avoid becoming dehydrated.    Medicines    · Your doctor will tell you if and when you can restart your medicines. He or she will also give you instructions about taking any new medicines.     · If you take aspirin or some other blood thinner, ask your doctor if and when to start taking it again. Make sure that you understand exactly what your doctor wants you to do.     · Do not take aspirin or anti-inflammatory medicines for a week after the biopsy. Incision care    · Keep a bandage over the puncture site for the first 1 or 2 days. Follow-up care is a key part of your treatment and safety. Be sure to make and go to all appointments, and call your doctor if you are having problems. It's also a good idea to know your test results and keep a list of the medicines you take. When should you call for help? Call 911 anytime you think you may need emergency care. For example, call if:    · You passed out (lost consciousness). Call your doctor now or seek immediate medical care if:    · You have signs of infection, such as:  ? Increased pain, swelling, warmth, or redness. ? Red streaks leading from the puncture site. ? Pus draining from the puncture site. ? A fever.     · Bright red blood has soaked through the bandage over the puncture site.     · You have new or worse pain at the puncture site. Watch closely for any changes in your health, and call your doctor if:    · You have blood in your urine for more than 1 day. Where can you learn more? Go to http://www.gray.com/  Enter S675 in the search box to learn more about \"Needle Biopsy of the Kidney: What to Expect at Home. \"  Current as of: December 17, 2020               Content Version: 13.0  © 2006-2021 [employer]. Care instructions adapted under license by [employer] (which disclaims liability or warranty for this information).  If you have questions about a medical condition or this instruction, always ask your healthcare professional. Shirley Farley disclaims any warranty or liability for your use of this information. DISCHARGE SUMMARY from Nurse    PATIENT INSTRUCTIONS:    Report the following to your surgeon:  · Excessive pain, swelling, redness or odor of or around the surgical area  · Temperature over 100.5  · Nausea and vomiting lasting longer than 4 hours or if unable to take medications  · Any signs of decreased circulation or nerve impairment to extremity: change in color, persistent  numbness, tingling, coldness or increase pain  · Any questions    *  Please update this list whenever your medications are discontinued, doses are      changed, or new medications (including over-the-counter products) are added. *  Please carry medication information at all times in case of emergency situations. These are general instructions for a healthy lifestyle:    No smoking/ No tobacco products/ Avoid exposure to second hand smoke  Surgeon General's Warning:  Quitting smoking now greatly reduces serious risk to your health. Obesity, smoking, and sedentary lifestyle greatly increases your risk for illness    A healthy diet, regular physical exercise & weight monitoring are important for maintaining a healthy lifestyle    You may be retaining fluid if you have a history of heart failure or if you experience any of the following symptoms:  Weight gain of 3 pounds or more overnight or 5 pounds in a week, increased swelling in our hands or feet or shortness of breath while lying flat in bed. Please call your doctor as soon as you notice any of these symptoms; do not wait until your next office visit. The discharge information has been reviewed with the patient and sister. The patient and sister verbalized understanding.   Discharge medications reviewed with the patient and sister and appropriate educational materials and side effects teaching were provided.   ___________________________________________________________________________________________________________________________________

## 2021-10-25 NOTE — PROGRESS NOTES
TRANSFER - OUT REPORT:    Verbal report given to BERNADETTE Mccartney (name) on Callum Fontenot  being transferred to Care Unit (unit) for routine progression of care       Report consisted of patients Situation, Background, Assessment and   Recommendations(SBAR). Information from the following report(s) SBAR, Procedure Summary, MAR and Cardiac Rhythm NSR was reviewed with the receiving nurse. Lines:   Peripheral IV 10/25/21 Anterior;Right Wrist (Active)        Opportunity for questions and clarification was provided.       Patient transported with:   Registered Nurse     Bandaid to Right flank    Tomeka Owen RN

## 2021-10-25 NOTE — PROGRESS NOTES
0945  Pt returned to care unit post procedure, pt aaox3,  Skin warm and dry,  Pt has bandaid intact to right flank area, site clean and dry.   Pt without complaints at this time  1015 pt assisted up to bathroom   Voided without incident,  Assisted back to bed,  Pt states urine clear yellow in color

## 2021-10-25 NOTE — PROGRESS NOTES
Patient takes no anticoagulants, and has had no prior issues with sedation medications. Education regarding procedure and sedation provided; pt states understanding. Consent confirmed. Patient resting comfortably. Will continue to monitor.     Teodora Caal RN

## 2021-10-25 NOTE — PROGRESS NOTES
Discharge inst given and reviewed with pt and sister,  Both verbalize understanding, arm bands removed and shredded,  Pt discharged via wheelchair to car in care of sister,pt denies any pain or distress at time of discharge

## 2021-10-25 NOTE — PROCEDURES
Interventional Radiology Brief Procedure Note    Interventional Radiologist: Sukh Leon MD    Pre-operative Diagnosis: chronic kidney disease    Post-operative Diagnosis: Same as pre-operative diagnosis    Procedure(s) Performed:  CT guide biopsy     Anesthesia:  Local     Findings:  Informed consent. CT guided biopsy of right kidney performed. See final dictated report for full details.     Complications: No immediate    Estimated Blood Loss:  Minimal    Specimens: 4 cores    Sukh Leon MD  [facility]  10/25/2021

## 2021-12-01 ENCOUNTER — TELEPHONE (OUTPATIENT)
Dept: INTERNAL MEDICINE CLINIC | Age: 56
End: 2021-12-01

## 2021-12-01 DIAGNOSIS — E78.5 DYSLIPIDEMIA: Primary | ICD-10-CM

## 2021-12-01 RX ORDER — ATORVASTATIN CALCIUM 40 MG/1
40 TABLET, FILM COATED ORAL DAILY
Qty: 90 TABLET | Refills: 3 | Status: SHIPPED | OUTPATIENT
Start: 2021-12-01

## 2021-12-01 NOTE — TELEPHONE ENCOUNTER
Patient called and said that at his last Office Visit with Dr. Jo Leung, they had discussed possibly upping the dosage of the following medication:     - atorvastatin (LIPITOR) 20 mg tablet    Patient wasn't sure what the dosage would be upped to but is requesting if it can go ahead and be raised if possible. Patient also states that he is trying to loose a little weight. He can be reached at [phone] if needed.     Please advise, thank you

## 2022-01-02 NOTE — PROGRESS NOTES
55 y.o. white male who presents for evaluation    No cardiovascular complaints. He is doing some light weights and walking 30 min daily    Continuing to lose weight as portion control and food choices continue to improve     Vitals 1/10/2022   Weight 191 lb     Vitals 6/24/2021 12/17/2020 12/9/2019 2/19/2019   Weight 200 lb 215 lb 224 lb 214 lb     No gi or gu complaints. No new psych issues, continues to see Dr Christoph Miller on cpap and doing well    He saw Dr Jenni Gutierrez for 2nd opinion and bx did show FSGS.   He had been off bp meds but the losartan was restarted    Past Medical History:   Diagnosis Date    Bipolar disorder [facility])     Dr. Zia Pineda since 1984    CKD (chronic kidney disease) 2007    Dr. Tobin Magaña; Dr Jenni Gutierrez 2021 bx FSGS    Colon adenoma     and diverticulosis 7/15 Dr Christa Boyer    COVID-19 virus infection 09/2021    Degenerative arthritis of spine 01/2016    on t and l spine films YOBANI    Dyslipidemia     calculated 10 yr risk score was 5.9% (4/14); 5.4% (11/14); 4.3% (5/15); 6.7% (5/16); 7.3% (11/16)    Erectile dysfunction 8/10/2017    Hypertension     IFG (impaired fasting glucose) 11/23/2015    Lumbar radiculitis 2020    on emg Dr Halima Ozuna Myofascial pain 2016    Dr Bin Martino    Obesity     IF 11/18 start weight 222 lbs    OTIS on CPAP     Dr Chucho Conroy Proteinuria 2011    Subacromial bursitis     right Dr. Benjamin Velez     Past Surgical History:   Procedure Laterality Date   Latesha Boyer 7/15 adenoma and divertics; Dr Roel Joe 2/25/21 divetics and adenomas    GA CARDIAC SURG PROCEDURE UNLIST      ETT negative 2004, 2009     Social History     Socioeconomic History    Marital status:      Spouse name: Not on file    Number of children: 1    Years of education: Not on file    Highest education level: Not on file   Occupational History    Occupation: [address]   Tobacco Use    Smoking status: Never Smoker    Smokeless tobacco: Never Used   Vaping Use    Vaping Use: Never used   Substance and Sexual Activity    Alcohol use: Yes     Comment: social    Drug use: No    Sexual activity: Not Currently   Other Topics Concern    Not on file   Social History Narrative    Not on file     Social Determinants of Health     Financial Resource Strain:     Difficulty of Paying Living Expenses: Not on file   Food Insecurity:     Worried About Running Out of Food in the Last Year: Not on file    Erick of Food in the Last Year: Not on file   Transportation Needs:     Lack of Transportation (Medical): Not on file    Lack of Transportation (Non-Medical): Not on file   Physical Activity:     Days of Exercise per Week: Not on file    Minutes of Exercise per Session: Not on file   Stress:     Feeling of Stress : Not on file   Social Connections:     Frequency of Communication with Friends and Family: Not on file    Frequency of Social Gatherings with Friends and Family: Not on file    Attends Cheondo[employer] Services: Not on file    Active Member of [address] [place] [address] or Organizations: Not on file    Attends Club or Organization Meetings: Not on file    Marital Status: Not on file   Intimate Partner Violence:     Fear of Current or Ex-Partner: Not on file    Emotionally Abused: Not on file    Physically Abused: Not on file    Sexually Abused: Not on file   Housing Stability:     Unable to Pay for Housing in the Last Year: Not on file    Number of [place] in the Last Year: Not on file    Unstable Housing in the Last Year: Not on file     Current Outpatient Medications   Medication Sig    losartan (COZAAR) 25 mg tablet Take 25 mg by mouth nightly.  atorvastatin (LIPITOR) 40 mg tablet Take 1 Tablet by mouth daily. Indications: excessive fat in the blood    ketoconazole (NIZORAL) 2 % shampoo Apply 5 mL to affected area as needed for Itching.  OXcarbazepine (TRILEPTAL) 300 mg tablet Take  by mouth.     OLANZapine (ZyPREXA) 5 mg tablet Take 5 mg by mouth two (2) times a day. Takes one in am , one in pm    polyethylene glycol (MIRALAX) 17 gram packet Take 17 g by mouth daily.  Cholecalciferol, Vitamin D3, (VITAMIN D) 2,000 unit Cap Take  by mouth.  lamoTRIgine (LAMICTAL) 100 mg tablet Take 100 mg by mouth as directed. Pt takes 200mg in the am and 200mg in the pm     No current facility-administered medications for this visit. No Known Allergies    REVIEW OF SYSTEMS: colo 2/21 Dr Kayleigh Whittaker  Ophtho - no vision change or eye pain  Oral - no mouth pain, tongue or tooth problems  Ears - no hearing loss, ear pain, fullness, no swallowing problems  Cardiac - no CP, PND, orthopnea, edema, palpitations or syncope  Chest - no breast masses  Resp - no wheezing, chronic coughing, dyspnea  GI - no heartburn, nausea, vomiting, change in bowel habits, bleeding, hemorrhoids  Urinary - no dysuria, hematuria, flank pain, urgency, frequency    Visit Vitals  /72   Pulse 81   Temp 97 °F (36.1 °C) (Temporal)   Resp 16   Ht 5' 9\" (1.753 m)   Wt 191 lb (86.6 kg)   SpO2 96%   BMI 28.21 kg/m²   A&O x3  Affect is appropriate. Mood stable  No apparent distress  Anicteric, no JVD, adenopathy or thyromegaly. No carotid bruits or radiated murmur  Lungs clear to auscultation, no wheezes or rales  Heart showed regular rate and rhythm. No murmur, rubs, gallops  Abdomen soft nontender, no hepatosplenomegaly or masses. Extremities without edema.   Pulses 1-2+ symmetrically    LABS  From 11/09 showed gluc 102, cr 1.80, gfr 44,          chol 215, tg 350, hdl 34, ldl-c 111  From 1/10 showed       alt 30,         chol 200, tg 240, hdl 33, ldl-c 119  From 6/11 showed   gluc 86,   cr 2.30,    alt 17,         chol 198, tg 134, hdl 38, ldl-c 133, wbc 2.8, hb 12.4, plt 190, psa 0.40  From 12/11 showed gluc 95,   cr 2.01, gfr 39,   ldl-p 1438, chol 201, tg 144, hdl 48, ldl-c 124,              24hUpr 474  From 6/12 showed       ldl-p 1611, chol 228, tg 231, hdl 39, ldl-c 143  From 5/14 showed   gluc 96,   cr 1.95, gfr 37, alt 13,         chol 242, tg 450, hdl 32, ldl-c na,   wbc 6.1, hb 13.2, plt 229, psa 0.30, tsh 1.83,  vit d 34.7  From 11/14 showed              chol 250, tg 340, hdl 35, ldl-c 147  From 5/15 showed   gluc 97,   cr 2.29, gfr 31,   hba1c 5.7, chol 215, tg 255, hdl 36, ldl-c 128  From 11/15 showed gluc 104, cr 2.14, gfr 35,   hba1c 5.6,           tsh 1.62  From 5/16 showed   gluc 92,   cr 2.11, gfr 34,          chol 252, tg 438, hdl 35, ldl-c na,   wbc 5.2, hb 13.6, plt 233  From 11/16 showed       hba1c 5.1, chol 254, tg 321, hdl 39, ldl-c 151  From 11/17 showed gluc 90,   cr 2.31, gfr 32, alt 24,         chol 278, tg 342, hdl 38, ldl-c 182, wbc 6.3, hb 13.6, plt 253  From 11/18 showed gluc 102, cr 2.44, gfr 28, alt 36,         chol 250, tg 436, hdl 37, ldl-c na,   wbc 5.9, hb 12.7, plt 268, psa 0.40  From 5/19 showed                                   24h Upr 1887  From 9/19 showed                           24h Upr 2237  From 11/19 showed gluc 105, cr 2.83, gfr 24, alt 32,         chol 297, tg 330, hdl 39, ldl-c 192, wbc 6.0, hb 12.5, plt 270  From 12/20 showed gluc 87,   cr 3.30, gfr 20, alt 23, hba1c 5.3, chol 231, tg 362, hdl 41, ldl-c 118, wbc 6.6, hb 12.3, plt 274,             fe 64, %sat 21, ferritin 169, b12 424, fol 13.8  From 2/21 showed     cr 2.93  From 6/21 showed       alt 42, hba1c 5.1, chol 200, tg 310, hdl 45, ldl-c 101  From 10/21 showed gluc 100, cr 3.44, gfr 19,         wbc 5.7, hb 11.6, plt 277,             24hUpr 3870    Results for orders placed or performed during the hospital encounter of 01/03/22   HEPATITIS C AB   Result Value Ref Range    Hepatitis C virus Ab 0.02 <0.80 Index    Hep C virus Ab Interp.  Negative NEG      Hep C  virus Ab comment         CBC W/O DIFF   Result Value Ref Range    WBC 6.1 4.6 - 13.2 K/uL    RBC 4.25 (L) 4.35 - 5.65 M/uL    HGB 11.6 (L) 13.0 - 16.0 g/dL    HCT 37.0 36.0 - 48.0 %    MCV 87.1 78.0 - 100.0 FL    MCH 27.3 24.0 - 34.0 PG    MCHC 31.4 31.0 - 37.0 g/dL    RDW 13.6 11.6 - 14.5 %    PLATELET 587 048 - 456 K/uL    MPV 10.1 9.2 - 11.8 FL    NRBC 0.0 0  WBC    ABSOLUTE NRBC 0.00 0.00 - 6.70 K/uL   METABOLIC PANEL, COMPREHENSIVE   Result Value Ref Range    Sodium 141 136 - 145 mmol/L    Potassium 4.4 3.5 - 5.5 mmol/L    Chloride 109 100 - 111 mmol/L    CO2 26 21 - 32 mmol/L    Anion gap 6 3.0 - 18 mmol/L    Glucose 92 74 - 99 mg/dL    BUN 37 (H) 7.0 - 18 MG/DL    Creatinine 3.81 (H) 0.6 - 1.3 MG/DL    BUN/Creatinine ratio 10 (L) 12 - 20      GFR est AA 20 (L) >60 ml/min/1.73m2    GFR est non-AA 17 (L) >60 ml/min/1.73m2    Calcium 9.1 8.5 - 10.1 MG/DL    Bilirubin, total 0.6 0.2 - 1.0 MG/DL    ALT (SGPT) 24 16 - 61 U/L    AST (SGOT) 18 10 - 38 U/L    Alk.  phosphatase 145 (H) 45 - 117 U/L    Protein, total 6.9 6.4 - 8.2 g/dL    Albumin 3.9 3.4 - 5.0 g/dL    Globulin 3.0 2.0 - 4.0 g/dL    A-G Ratio 1.3 0.8 - 1.7     LIPID PANEL   Result Value Ref Range    LIPID PROFILE          Cholesterol, total 190 <200 MG/DL    Triglyceride 203 (H) <150 MG/DL    HDL Cholesterol 58 40 - 60 MG/DL    LDL, calculated 91.4 0 - 100 MG/DL    VLDL, calculated 40.6 MG/DL    CHOL/HDL Ratio 3.3 0 - 5.0     IRON PROFILE   Result Value Ref Range    Iron 64 50 - 175 ug/dL    TIBC 301 250 - 450 ug/dL    Iron % saturation 21 20 - 50 %   HEMOGLOBIN A1C W/O EAG   Result Value Ref Range    Hemoglobin A1c 5.2 4.2 - 5.6 %   RETICULOCYTE COUNT   Result Value Ref Range    Reticulocyte count 1.1 0.5 - 2.5 %   FERRITIN   Result Value Ref Range    Ferritin 204 8 - 388 NG/ML   PROTEIN ELECTROPHORESIS   Result Value Ref Range    Protein, total 6.5 6.0 - 8.5 g/dL    Albumin 3.7 2.9 - 4.4 g/dL    Alpha-1-globulin 0.3 0.0 - 0.4 g/dL    ALPHA-2 GLOBULIN 0.9 0.4 - 1.0 g/dL    Beta globulin 0.9 0.7 - 1.3 g/dL    Gamma globulin 0.6 0.4 - 1.8 g/dL    M-Brayden Not Observed Not Observed g/dL    Globulin, total 2.8 2.2 - 3.9 g/dL    A/G ratio 1.3 0.7 - 1.7       We reviewed the patient's labs from the last several visits to point out trends in the numbers            Patient Active Problem List   Diagnosis Code    Bipolar disorder Dr. Vladislav Ibrahim F31.9    Dyslipidemia E78.5    Primary hypertension I10    OTIS on CPAP Dr Ángel Gerber G47.33, Z99.89    Colon adenoma 7/15 Dr Jean Claude Matthew D12.6    IFG (impaired fasting glucose) R73.01    Overweight (BMI 25.0-29. 9) E66.3    CKD (chronic kidney disease) stage 4, GFR 15-29 ml/min (HCC) N18.4    Erectile dysfunction N52.9     Assessment and plan:  1. BPD. Continue current and f/u Dr. Casey Mclaughlin  2. CKD4 and proteinuria. Follow up Dr Edin Ortega as directed  3. Dyslipidemia. At target on lipitor  4. HTN. Restarted on arb  5. Obesity. Congratulated him on his continued success. Continue lifestyle and dietary measures, portion control  6. IFG. Resolved with the wt loss  7. OTIS on cpap. F/U Dr Ángel Gerber  8. Colon adenoma. Fiber, colo 2026  9. Anemia. Stable and follow        RTC 7/22    Above conditions discussed at length and patient vocalized understanding. All questions answered to patient satisfaction      ICD-10-CM ICD-9-CM    1. Primary hypertension  U11 922.1 METABOLIC PANEL, COMPREHENSIVE   2. Colon adenoma 7/15 Dr Jean Claude Matthew  D12.6 211.3    3. IFG (impaired fasting glucose)  R73.01 790.21 HEMOGLOBIN A1C WITH EAG   4. CKD (chronic kidney disease) stage 4, GFR 15-29 ml/min (HCC)  N18.4 585.4    5. OTIS on CPAP Dr Ángel Gerber  G47.33 327.23     Z99.89 V46.8    6. Dyslipidemia  E78.5 272.4    7.  Bipolar disorder, in full remission, most [facility] episode mixed [facility]  F31.78 296.66

## 2022-01-03 ENCOUNTER — HOSPITAL ENCOUNTER (OUTPATIENT)
Dept: LAB | Age: 57
Discharge: HOME | End: 2022-01-03
Payer: COMMERCIAL

## 2022-01-03 ENCOUNTER — LAB ONLY (OUTPATIENT)
Dept: INTERNAL MEDICINE CLINIC | Age: 57
End: 2022-01-03

## 2022-01-03 DIAGNOSIS — R73.01 IFG (IMPAIRED FASTING GLUCOSE): ICD-10-CM

## 2022-01-03 DIAGNOSIS — D64.9 ANEMIA, UNSPECIFIED TYPE: ICD-10-CM

## 2022-01-03 DIAGNOSIS — Z11.59 NEED FOR HEPATITIS C SCREENING TEST: ICD-10-CM

## 2022-01-03 DIAGNOSIS — E78.5 DYSLIPIDEMIA: ICD-10-CM

## 2022-01-03 DIAGNOSIS — D64.9 ANEMIA, UNSPECIFIED TYPE: Primary | ICD-10-CM

## 2022-01-03 LAB
ALBUMIN SERPL-MCNC: 3.9 G/DL (ref 3.4–5)
ALBUMIN/GLOB SERPL: 1.3 {RATIO} (ref 0.8–1.7)
ALP SERPL-CCNC: 145 U/L (ref 45–117)
ALT SERPL-CCNC: 24 U/L (ref 16–61)
ANION GAP SERPL CALC-SCNC: 6 MMOL/L (ref 3–18)
AST SERPL-CCNC: 18 U/L (ref 10–38)
BILIRUB SERPL-MCNC: 0.6 MG/DL (ref 0.2–1)
BUN SERPL-MCNC: 37 MG/DL (ref 7–18)
BUN/CREAT SERPL: 10 (ref 12–20)
CALCIUM SERPL-MCNC: 9.1 MG/DL (ref 8.5–10.1)
CHLORIDE SERPL-SCNC: 109 MMOL/L (ref 100–111)
CHOLEST SERPL-MCNC: 190 MG/DL
CO2 SERPL-SCNC: 26 MMOL/L (ref 21–32)
CREAT SERPL-MCNC: 3.81 MG/DL (ref 0.6–1.3)
ERYTHROCYTE [DISTWIDTH] IN BLOOD BY AUTOMATED COUNT: 13.6 % (ref 11.6–14.5)
FERRITIN SERPL-MCNC: 204 NG/ML (ref 8–388)
GLOBULIN SER CALC-MCNC: 3 G/DL (ref 2–4)
GLUCOSE SERPL-MCNC: 92 MG/DL (ref 74–99)
HBA1C MFR BLD: 5.2 % (ref 4.2–5.6)
HCT VFR BLD AUTO: 37 % (ref 36–48)
HDLC SERPL-MCNC: 58 MG/DL (ref 40–60)
HDLC SERPL: 3.3 {RATIO} (ref 0–5)
HGB BLD-MCNC: 11.6 G/DL (ref 13–16)
IRON SATN MFR SERPL: 21 % (ref 20–50)
IRON SERPL-MCNC: 64 UG/DL (ref 50–175)
LDLC SERPL CALC-MCNC: 91.4 MG/DL (ref 0–100)
LIPID PROFILE,FLP: ABNORMAL
MCH RBC QN AUTO: 27.3 PG (ref 24–34)
MCHC RBC AUTO-ENTMCNC: 31.4 G/DL (ref 31–37)
MCV RBC AUTO: 87.1 FL (ref 78–100)
NRBC # BLD: 0 K/UL (ref 0–0.01)
NRBC BLD-RTO: 0 PER 100 WBC
PLATELET # BLD AUTO: 281 K/UL (ref 135–420)
PMV BLD AUTO: 10.1 FL (ref 9.2–11.8)
POTASSIUM SERPL-SCNC: 4.4 MMOL/L (ref 3.5–5.5)
PROT SERPL-MCNC: 6.9 G/DL (ref 6.4–8.2)
RBC # BLD AUTO: 4.25 M/UL (ref 4.35–5.65)
RETICS/RBC NFR AUTO: 1.1 % (ref 0.5–2.5)
SODIUM SERPL-SCNC: 141 MMOL/L (ref 136–145)
TIBC SERPL-MCNC: 301 UG/DL (ref 250–450)
TRIGL SERPL-MCNC: 203 MG/DL (ref ?–150)
VLDLC SERPL CALC-MCNC: 40.6 MG/DL
WBC # BLD AUTO: 6.1 K/UL (ref 4.6–13.2)

## 2022-01-03 PROCEDURE — 80053 COMPREHEN METABOLIC PANEL: CPT

## 2022-01-03 PROCEDURE — 83036 HEMOGLOBIN GLYCOSYLATED A1C: CPT

## 2022-01-03 PROCEDURE — 82728 ASSAY OF FERRITIN: CPT

## 2022-01-03 PROCEDURE — 86803 HEPATITIS C AB TEST: CPT

## 2022-01-03 PROCEDURE — 85027 COMPLETE CBC AUTOMATED: CPT

## 2022-01-03 PROCEDURE — 84165 PROTEIN E-PHORESIS SERUM: CPT

## 2022-01-03 PROCEDURE — 83540 ASSAY OF IRON: CPT

## 2022-01-03 PROCEDURE — 36415 COLL VENOUS BLD VENIPUNCTURE: CPT

## 2022-01-03 PROCEDURE — 80061 LIPID PANEL: CPT

## 2022-01-03 PROCEDURE — 85045 AUTOMATED RETICULOCYTE COUNT: CPT

## 2022-01-04 LAB
HCV AB SER IA-ACNC: 0.02 INDEX
HCV AB SERPL QL IA: NEGATIVE
HCV COMMENT,HCGAC: NORMAL

## 2022-01-05 LAB
ALBUMIN SERPL ELPH-MCNC: 3.7 G/DL (ref 2.9–4.4)
ALBUMIN/GLOB SERPL: 1.3 {RATIO} (ref 0.7–1.7)
ALPHA1 GLOB SERPL ELPH-MCNC: 0.3 G/DL (ref 0–0.4)
ALPHA2 GLOB SERPL ELPH-MCNC: 0.9 G/DL (ref 0.4–1)
B-GLOBULIN SERPL ELPH-MCNC: 0.9 G/DL (ref 0.7–1.3)
GAMMA GLOB SERPL ELPH-MCNC: 0.6 G/DL (ref 0.4–1.8)
GLOBULIN SER CALC-MCNC: 2.8 G/DL (ref 2.2–3.9)
M PROTEIN SERPL ELPH-MCNC: NORMAL G/DL
PROT SERPL-MCNC: 6.5 G/DL (ref 6–8.5)

## 2022-01-10 ENCOUNTER — OFFICE VISIT (OUTPATIENT)
Dept: INTERNAL MEDICINE CLINIC | Age: 57
End: 2022-01-10
Payer: COMMERCIAL

## 2022-01-10 VITALS
WEIGHT: 191 LBS | HEIGHT: 69 IN | TEMPERATURE: 97 F | OXYGEN SATURATION: 96 % | DIASTOLIC BLOOD PRESSURE: 72 MMHG | RESPIRATION RATE: 16 BRPM | HEART RATE: 81 BPM | SYSTOLIC BLOOD PRESSURE: 134 MMHG | BODY MASS INDEX: 28.29 KG/M2

## 2022-01-10 DIAGNOSIS — N18.4 CKD (CHRONIC KIDNEY DISEASE) STAGE 4, GFR 15-29 ML/MIN (HCC): ICD-10-CM

## 2022-01-10 DIAGNOSIS — Z99.89 OSA ON CPAP: ICD-10-CM

## 2022-01-10 DIAGNOSIS — G47.33 OSA ON CPAP: ICD-10-CM

## 2022-01-10 DIAGNOSIS — D12.6 COLON ADENOMA: ICD-10-CM

## 2022-01-10 DIAGNOSIS — F31.78 BIPOLAR DISORDER, IN FULL REMISSION, MOST RECENT EPISODE MIXED (HCC): ICD-10-CM

## 2022-01-10 DIAGNOSIS — E78.5 DYSLIPIDEMIA: ICD-10-CM

## 2022-01-10 DIAGNOSIS — I10 PRIMARY HYPERTENSION: Primary | ICD-10-CM

## 2022-01-10 DIAGNOSIS — R73.01 IFG (IMPAIRED FASTING GLUCOSE): ICD-10-CM

## 2022-01-10 PROCEDURE — 99214 OFFICE O/P EST MOD 30 MIN: CPT | Performed by: INTERNAL MEDICINE

## 2022-01-10 RX ORDER — LOSARTAN POTASSIUM 25 MG/1
25 TABLET ORAL
COMMUNITY
Start: 2021-11-09 | End: 2022-07-21

## 2022-01-10 NOTE — PROGRESS NOTES
Gloria Basurto presents with   Chief Complaint   Patient presents with    Follow-up     6 month     Cholesterol Problem    Labs     1-3-22            1. \"Have you been to the ER, urgent care clinic since your last visit? Hospitalized since your last visit? \" Bhavesh Tanner for CKD 10-25-21 Kidney Biopsy    2. \"Have you seen or consulted any other health care providers outside of the [address] [place] [address] since your last visit? \" NO    3. For patients aged 39-70: Has the patient had a colonoscopy? Yes, HM satisfied with blue hyperlink     If the patient is female:    4. For patients aged 41-77: Has the patient had a mammogram within the past 2 years? NA based on age or sex    11. For patients aged 21-65: Has the patient had a pap smear?  NA based on age or sex

## 2022-03-19 PROBLEM — N52.9 ERECTILE DYSFUNCTION: Status: ACTIVE | Noted: 2017-08-10

## 2022-07-07 ENCOUNTER — HOSPITAL ENCOUNTER (OUTPATIENT)
Dept: LAB | Age: 57
Discharge: HOME | End: 2022-07-07
Payer: COMMERCIAL

## 2022-07-07 ENCOUNTER — APPOINTMENT (OUTPATIENT)
Dept: INTERNAL MEDICINE CLINIC | Age: 57
End: 2022-07-07

## 2022-07-07 DIAGNOSIS — I10 PRIMARY HYPERTENSION: ICD-10-CM

## 2022-07-07 DIAGNOSIS — R73.01 IFG (IMPAIRED FASTING GLUCOSE): ICD-10-CM

## 2022-07-07 LAB
ALBUMIN SERPL-MCNC: 3.9 G/DL (ref 3.4–5)
ALBUMIN/GLOB SERPL: 1.6 {RATIO} (ref 0.8–1.7)
ALP SERPL-CCNC: 134 U/L (ref 45–117)
ALT SERPL-CCNC: 19 U/L (ref 16–61)
ANION GAP SERPL CALC-SCNC: 8 MMOL/L (ref 3–18)
AST SERPL-CCNC: 17 U/L (ref 10–38)
BILIRUB SERPL-MCNC: 0.3 MG/DL (ref 0.2–1)
BUN SERPL-MCNC: 49 MG/DL (ref 7–18)
BUN/CREAT SERPL: 12 (ref 12–20)
CALCIUM SERPL-MCNC: 9.1 MG/DL (ref 8.5–10.1)
CHLORIDE SERPL-SCNC: 102 MMOL/L (ref 100–111)
CO2 SERPL-SCNC: 26 MMOL/L (ref 21–32)
CREAT SERPL-MCNC: 3.94 MG/DL (ref 0.6–1.3)
EST. AVERAGE GLUCOSE BLD GHB EST-MCNC: 103 MG/DL
GLOBULIN SER CALC-MCNC: 2.4 G/DL (ref 2–4)
GLUCOSE SERPL-MCNC: 87 MG/DL (ref 74–99)
HBA1C MFR BLD: 5.2 % (ref 4.2–5.6)
POTASSIUM SERPL-SCNC: 5.1 MMOL/L (ref 3.5–5.5)
PROT SERPL-MCNC: 6.3 G/DL (ref 6.4–8.2)
SODIUM SERPL-SCNC: 136 MMOL/L (ref 136–145)

## 2022-07-07 PROCEDURE — 83036 HEMOGLOBIN GLYCOSYLATED A1C: CPT

## 2022-07-07 PROCEDURE — 36415 COLL VENOUS BLD VENIPUNCTURE: CPT

## 2022-07-07 PROCEDURE — 80053 COMPREHEN METABOLIC PANEL: CPT

## 2022-07-18 NOTE — PROGRESS NOTES
55 y.o. white male who presents for evaluation    No cardiovascular complaints. He is doing some light weights and walking several times weekly. Still doing okay on the diet although weight is up some     Vitals 7/21/2022 1/10/2022 1/10/2022   Weight 197 lb  191 lb     Vitals 6/24/2021 12/17/2020 12/9/2019 2/19/2019   Weight 200 lb 215 lb 224 lb 214 lb     No gi or gu complaints. No new psych issues, continues to see Dr Milly Moura on cpap and doing well    He saw Dr Mario Alberto Chauhan for 2nd opinion and bx did show FSGS. He was maintained on losartan for proteinuria and coreg added for better bp control.   He's getting 130s mostly, no sfx    Past Medical History:   Diagnosis Date    Bipolar disorder ([facility]     Dr. Dian Hobbs since 1984    CKD (chronic kidney disease) 2007    Dr. Bubba Soto; Dr Mario Alberto Chauhan 2021 bx FSGS    Colon adenoma     and diverticulosis 7/15 Dr Andrey Cheung    COVID-19 virus infection 09/2021    Degenerative arthritis of spine 01/2016    on t and l spine films YOBANI    Dyslipidemia     calculated 10 yr risk score was 5.9% (4/14); 5.4% (11/14); 4.3% (5/15); 6.7% (5/16); 7.3% (11/16)    Erectile dysfunction 8/10/2017    Hypertension     IFG (impaired fasting glucose) 11/23/2015    Lumbar radiculitis 2020    on emg Dr Hans Waterman    Myofascial pain 2016    Dr Blas Grayson    Obesity     IF 11/18 start weight 222 lbs    OTIS on CPAP     Dr Lynn Boy    Proteinuria 2011    Subacromial bursitis     right Dr. Jacqueline Parker     Past Surgical History:   Procedure Laterality Date    HX COLONOSCOPY      Dr Andrey Cheung 7/15 adenoma and divertics; Dr Matthew Story 2/25/21 divetics and adenomas    [place] CARDIAC SURG PROCEDURE UNLIST      ETT negative 2004, 2009     Social History     Socioeconomic History    Marital status:      Spouse name: Not on file    Number of children: 1    Years of education: Not on file    Highest education level: Not on file   Occupational History    Occupation: [address]   Tobacco Use    Smoking status: Never Smokeless tobacco: Never   Vaping Use    Vaping Use: Never used   Substance and Sexual Activity    Alcohol use: Yes     Comment: social    Drug use: No    Sexual activity: Not Currently   Other Topics Concern    Not on file   Social History Narrative    Not on file     Social Determinants of Health     Financial Resource Strain: Not on file   Food Insecurity: Not on file   Transportation Needs: Not on file   Physical Activity: Not on file   Stress: Not on file   Social Connections: Not on file   Intimate Partner Violence: Not on file   Housing Stability: Not on file     Current Outpatient Medications   Medication Sig    calcitRIOL (ROCALTROL) 0.25 mcg capsule Take 0.25 mcg by mouth in the morning. carvediloL (COREG) 6.25 mg tablet     losartan (COZAAR) 100 mg tablet Take 100 mg by mouth nightly. atorvastatin (LIPITOR) 40 mg tablet Take 1 Tablet by mouth daily. Indications: excessive fat in the blood    ketoconazole (NIZORAL) 2 % shampoo Apply 5 mL to affected area as needed for Itching. OXcarbazepine (TRILEPTAL) 300 mg tablet Take  by mouth. OLANZapine (ZyPREXA) 5 mg tablet Take 5 mg by mouth two (2) times a day. Takes one in am , one in pm    polyethylene glycol (MIRALAX) 17 gram packet Take 17 g by mouth daily. cholecalciferol (VITAMIN D3) (2,000 UNITS /50 MCG) cap capsule Take  by mouth.    lamoTRIgine (LAMICTAL) 100 mg tablet Take 100 mg by mouth as directed. Pt takes 200mg in the am and 200mg in the pm     No current facility-administered medications for this visit.      No Known Allergies    REVIEW OF SYSTEMS: colo 2/21 Dr Swati Nunez  Ophtho - no vision change or eye pain  Oral - no mouth pain, tongue or tooth problems  Ears - no hearing loss, ear pain, fullness, no swallowing problems  Cardiac - no CP, PND, orthopnea, edema, palpitations or syncope  Chest - no breast masses  Resp - no wheezing, chronic coughing, dyspnea  GI - no heartburn, nausea, vomiting, change in bowel habits, bleeding, hemorrhoids  Urinary - no dysuria, hematuria, flank pain, urgency, frequency    Visit Vitals  BP (!) 144/82 (BP 1 Location: Right upper arm, BP Patient Position: Sitting, BP Cuff Size: Adult)   Pulse 60   Temp 97.1 °F (36.2 °C) (Temporal)   Resp 16   Ht 5' 9\" (1.753 m)   Wt 197 lb (89.4 kg)   SpO2 98%   BMI 29.09 kg/m²   A&O x3  Affect is appropriate. Mood stable  No apparent distress  Anicteric, no JVD, adenopathy or thyromegaly. No carotid bruits or radiated murmur  Lungs clear to auscultation, no wheezes or rales  Heart showed regular rate and rhythm. No murmur, rubs, gallops  Abdomen soft nontender, no hepatosplenomegaly or masses. Extremities without edema.   Pulses 1-2+ symmetrically    LABS  From 11/09 showed gluc 102, cr 1.80, gfr 44,          chol 215, tg 350, hdl 34, ldl-c 111  From 1/10 showed       alt 30,         chol 200, tg 240, hdl 33, ldl-c 119  From 6/11 showed   gluc 86,   cr 2.30,    alt 17,         chol 198, tg 134, hdl 38, ldl-c 133, wbc 2.8, hb 12.4, plt 190, psa 0.40  From 12/11 showed gluc 95,   cr 2.01, gfr 39,   ldl-p 1438, chol 201, tg 144, hdl 48, ldl-c 124,               24hUpr 474  From 6/12 showed       ldl-p 1611, chol 228, tg 231, hdl 39, ldl-c 143  From 5/14 showed   gluc 96,   cr 1.95, gfr 37, alt 13,         chol 242, tg 450, hdl 32, ldl-c na,   wbc 6.1, hb 13.2, plt 229, psa 0.30, tsh 1.83,  vit d 34.7  From 11/14 showed              chol 250, tg 340, hdl 35, ldl-c 147  From 5/15 showed   gluc 97,   cr 2.29, gfr 31,   hba1c 5.7, chol 215, tg 255, hdl 36, ldl-c 128  From 11/15 showed gluc 104, cr 2.14, gfr 35,   hba1c 5.6,             tsh 1.62  From 5/16 showed   gluc 92,   cr 2.11, gfr 34,          chol 252, tg 438, hdl 35, ldl-c na,   wbc 5.2, hb 13.6, plt 233  From 11/16 showed       hba1c 5.1, chol 254, tg 321, hdl 39, ldl-c 151  From 11/17 showed gluc 90,   cr 2.31, gfr 32, alt 24,         chol 278, tg 342, hdl 38, ldl-c 182, wbc 6.3, hb 13.6, plt 253  From 11/18 showed gluc 102, cr 2.44, gfr 28, alt 36,         chol 250, tg 436, hdl 37, ldl-c na,   wbc 5.9, hb 12.7, plt 268, psa 0.40  From 5/19 showed                                     24h Upr 1887  From 9/19 showed                             24h Upr 2237  From 11/19 showed gluc 105, cr 2.83, gfr 24, alt 32,         chol 297, tg 330, hdl 39, ldl-c 192, wbc 6.0, hb 12.5, plt 270  From 12/20 showed gluc 87,   cr 3.30, gfr 20, alt 23, hba1c 5.3, chol 231, tg 362, hdl 41, ldl-c 118, wbc 6.6, hb 12.3, plt 274,               fe 64, %sat 21, ferritin 169, b12 424, fol 13.8  From 2/21 showed     cr 2.93  From 6/21 showed       alt 42, hba1c 5.1, chol 200, tg 310, hdl 45, ldl-c 101  From 10/21 showed gluc 100, cr 3.44, gfr 19,          wbc 5.7, hb 11.6, plt 277,               24hUpr 3870  From 1/22 showed   gluc 92,   cr 3.81, gfr 17, alt 24, hba1c 5.2, chol 190, tg 203, hdl 58, ldl-c 91,   wbc 6.1, hb 11.6, plt 281,               fe 64, %sat 21, ferritin 204, spep neg    Results for orders placed or performed during the hospital encounter of 63/09/02   METABOLIC PANEL, COMPREHENSIVE   Result Value Ref Range    Sodium 136 136 - 145 mmol/L    Potassium 5.1 3.5 - 5.5 mmol/L    Chloride 102 100 - 111 mmol/L    CO2 26 21 - 32 mmol/L    Anion gap 8 3.0 - 18 mmol/L    Glucose 87 74 - 99 mg/dL    BUN 49 (H) 7.0 - 18 MG/DL    Creatinine 3.94 (H) 0.6 - 1.3 MG/DL    BUN/Creatinine ratio 12 12 - 20      GFR est AA 19 (L) >60 ml/min/1.73m2    GFR est non-AA 16 (L) >60 ml/min/1.73m2    Calcium 9.1 8.5 - 10.1 MG/DL    Bilirubin, total 0.3 0.2 - 1.0 MG/DL    ALT (SGPT) 19 16 - 61 U/L    AST (SGOT) 17 10 - 38 U/L    Alk.  phosphatase 134 (H) 45 - 117 U/L    Protein, total 6.3 (L) 6.4 - 8.2 g/dL    Albumin 3.9 3.4 - 5.0 g/dL    Globulin 2.4 2.0 - 4.0 g/dL    A-G Ratio 1.6 0.8 - 1.7     HEMOGLOBIN A1C WITH EAG   Result Value Ref Range    Hemoglobin A1c 5.2 4.2 - 5.6 %    Est. average glucose 103 mg/dL     We reviewed the patient's labs from the last several visits to point out trends in the numbers            Patient Active Problem List   Diagnosis Code    Bipolar disorder Dr. Elena Stephenson F31.9    Dyslipidemia E78.5    Primary hypertension I10    OTIS on CPAP Dr Moe Esparza G47.33, Z99.89    Colon adenoma 7/15 Dr Britta Kay D12.6    IFG (impaired fasting glucose) R73.01    Overweight (BMI 25.0-29. 9) E66.3    CKD (chronic kidney disease) stage 4, GFR 15-29 ml/min (HCC) N18.4    Erectile dysfunction N52.9     Assessment and plan:  1. BPD. Continue current and f/u Dr. Kelli Joel  2. CKD4 and proteinuria. Follow up Dr Isidro Camargo as directed  3. Dyslipidemia. At target on lipitor  4. HTN. He wants Dr Isidro Camargo to manage, has appt in early Aug  5. Obesity. Continue lifestyle and dietary measures, portion control  6. IFG. Resolved with the wt loss  7. OTIS on cpap. F/U Dr Moe Esparza  8. Colon adenoma. Fiber, colo 2026  9. Anemia. Stable and follow        RTC 1/23    Above conditions discussed at length and patient vocalized understanding. All questions answered to patient satisfaction      ICD-10-CM ICD-9-CM    1. CKD (chronic kidney disease) stage 4, GFR 15-29 ml/min (HCC)  N18.4 585.4       2. Primary hypertension  I10 401.9 CBC W/O DIFF      METABOLIC PANEL, COMPREHENSIVE      3. IFG (impaired fasting glucose)  R73.01 790.21 HEMOGLOBIN A1C WITH EAG      4. Dyslipidemia  E78.5 272.4 LIPID PANEL      5. Bipolar disorder, in full remission, most recent episode mixed ([facility]  F31.78 296.66       6. OTIS on CPAP Dr Moe Esparza  G47.33 327.23     Z99.89 V46.8       7. Colon adenoma 7/15 Dr Britta Kay  D12.6 211.3       8.  Screening for malignant neoplasm of prostate  Z12.5 V76.44 PSA SCREENING (SCREENING)

## 2022-07-21 ENCOUNTER — OFFICE VISIT (OUTPATIENT)
Dept: INTERNAL MEDICINE CLINIC | Age: 57
End: 2022-07-21
Payer: COMMERCIAL

## 2022-07-21 VITALS
DIASTOLIC BLOOD PRESSURE: 82 MMHG | SYSTOLIC BLOOD PRESSURE: 144 MMHG | WEIGHT: 197 LBS | HEIGHT: 69 IN | HEART RATE: 60 BPM | RESPIRATION RATE: 16 BRPM | TEMPERATURE: 97.1 F | OXYGEN SATURATION: 98 % | BODY MASS INDEX: 29.18 KG/M2

## 2022-07-21 DIAGNOSIS — G47.33 OSA ON CPAP: ICD-10-CM

## 2022-07-21 DIAGNOSIS — I10 PRIMARY HYPERTENSION: ICD-10-CM

## 2022-07-21 DIAGNOSIS — E78.5 DYSLIPIDEMIA: ICD-10-CM

## 2022-07-21 DIAGNOSIS — R73.01 IFG (IMPAIRED FASTING GLUCOSE): ICD-10-CM

## 2022-07-21 DIAGNOSIS — Z99.89 OSA ON CPAP: ICD-10-CM

## 2022-07-21 DIAGNOSIS — D12.6 COLON ADENOMA: ICD-10-CM

## 2022-07-21 DIAGNOSIS — N18.4 CKD (CHRONIC KIDNEY DISEASE) STAGE 4, GFR 15-29 ML/MIN (HCC): Primary | ICD-10-CM

## 2022-07-21 DIAGNOSIS — Z12.5 SCREENING FOR MALIGNANT NEOPLASM OF PROSTATE: ICD-10-CM

## 2022-07-21 DIAGNOSIS — F31.78 BIPOLAR DISORDER, IN FULL REMISSION, MOST RECENT EPISODE MIXED (HCC): ICD-10-CM

## 2022-07-21 PROCEDURE — 99214 OFFICE O/P EST MOD 30 MIN: CPT | Performed by: INTERNAL MEDICINE

## 2022-07-21 RX ORDER — CALCITRIOL 0.25 UG/1
0.25 CAPSULE ORAL DAILY
COMMUNITY
Start: 2022-04-21

## 2022-07-21 RX ORDER — LOSARTAN POTASSIUM 100 MG/1
100 TABLET ORAL
COMMUNITY
Start: 2022-06-13

## 2022-07-21 RX ORDER — CARVEDILOL 6.25 MG/1
TABLET ORAL
COMMUNITY
Start: 2022-07-20

## 2022-07-21 NOTE — PROGRESS NOTES
Monserrat Cruz presents today for   Chief Complaint   Patient presents with    Follow-up    Hypertension       1. \"Have you been to the ER, urgent care clinic since your last visit? Hospitalized since your last visit? \" no    2. \"Have you seen or consulted any other health care providers outside of the [address] [place] [address] since your last visit? \" no     3. For patients aged 39-70: Has the patient had a colonoscopy / FIT/ Cologuard? Yes - no Care Gap present      If the patient is female:    4. For patients aged 41-77: Has the patient had a mammogram within the past 2 years? NA - based on age or sex  See top three    5. For patients aged 21-65: Has the patient had a pap smear?  No

## 2022-08-04 ENCOUNTER — DOCUMENTATION ONLY (OUTPATIENT)
Dept: NEPHROLOGY | Age: 57
End: 2022-08-04

## 2022-08-04 NOTE — PROGRESS NOTES
Bailee Dilip  Appointment: 8/3/2022 3:30 PM  Location: [facility] Office  Patient #: [id number]  : 1965  Undefined / Language: [place] / Race: White  Male      History of Present Illness Natasha Ross MD; 2022 4:58 AM)    Patient is a 52-year-old white male with history of longstanding hypertension, dyslipidemia bipolar disorder treated with lithium from  through year  and then stopped, arthritis, obstructive sleep apnea on CPAP, chronic kidney disease with nephrotic  proteinuria    Past medical history  #1 hypertension, essential  #2 dyslipidemia  #3 bipolar disorder  #4 history of lithium toxicity  #5 proteinuria  #6 chronic kidney disease stage IV with nephrotic proteinuria  #7 arthritis  #8 obesity  #9 colon adenoma  #10 hypoalbuminemia    PMH/Initial presentation  kidney functions have declined over the last 10 years. His GFR was in the 40 range about 10 years back which has progressively declined down to 20. His creatinine has increased to 3.2. Patient had been taking lithium ER  to get through  when he was realized to have elevated creatinine. Patient has been told that he has proteinuria I do not see any urine test suggesting quantification. He also had some lower extremity edema. Interval History  Denies any urinary symptoms.     gained 12 lbs , but says he is excerning well and eating better  no significant edema , sob  His blood pressure appears to be on higher side , ~ 140/80s , goal < 130  renal biopsy done in past: path results FSGS , HTN nephrosclerosis and advanced atherosclerotic disease, severe IFTA    creat 3.87  ,gfr < 18  prot/creat ratio 5 gm , on losartan  K 4.9 on 2 times weekly kayexalate  pth 277 on calcitriol    Problem List/Past Medical (Kimber Muller; 8/3/2022 3:33 PM)  Proteinuria (R80.9)    Hypertension (I10)    FSGS (focal segmental glomerulosclerosis) (N05.1)    Hyperkalemia (E87.5)    CKD (chronic kidney disease), stage IV (N18.4)    Sleep apnea (G47.30)    Bipolar disorder (F31.9)    Overweight (BMI 25.0 to 29.9) (E66.3)    Dyslipidemia (E78.5)    Problems Reconciled      Allergies (Dee Rice; 8/3/2022 3:33 PM)  No Known Drug Allergies   [08/09/2021]: Allergies Reconciled      Social History Morena Ferrer; 8/3/2022 3:33 PM)  Tobacco use   Never smoker. Alcohol Use   Moderate alcohol use. No Drug Use      Medication History Morena Ferrer; 8/3/2022 3:38 PM)  Losartan Potassium  (100MG Tablet, 1 (one) Oral at bedtime, Taken starting 03/21/2022) Active. Sodium Polystyrene Sulfonate  (30 Oral two times a week, Taken starting 08/03/2022) Active. Carvedilol  (6.25MG Tablet, 1 (one) Oral daily, Taken starting 04/21/2022) Active. Calcitriol  (0.25MCG Capsule, 1 (one) Oral daily, Taken starting 07/28/2022) Active. Vitamin D  (50 MCG(2000 UT) Capsule, 2 Oral daily) Active. lamoTRIgine  (100MG Tablet, 2 Oral two times daily) Active. (LAMICTAL)  Lipitor  (40MG Tablet, 1 Oral daily) Active. (atorvastatin)  Polyethylene Glycol 3350  (17GM/SCOOP Powder, Oral as needed) Active. (MIRALAX)  OXcarbazepine  (300MG Tablet, 2 Oral evening time) Active. (TRILEPTAL)  OLANZapine  (5MG Tablet, 5 mg am 5 mg pm Oral daily) Active. THE PAVILIION)  Medications Reconciled     Past Surgical History Morena Ferrer; 8/3/2022 3:33 PM)  Kye Ferrari 7/15 adenoma and divertics; Dr. Angela Fitzpatrick 2/25/21 divetics and adenomas    Health Maintenance History Morena Ferrer; 8/3/2022 3:33 PM)  [id number] vaccine dates: 04/05/2021, 05/07/2021 & 11/17/2021    Colonoscopy, Screening   [02/25/2021]: Normal.  Flu Vaccine   [11/17/2021]:        Review of Systems Filomena Naidu MD; 8/4/2022 4:56 AM)  General Not Present- Anorexia, Chills, Fatigue and Fever. Skin Not Present- Bruising, Pruritus, Rash and Ulcer. HEENT Not Present- Dry Mucous Membranes, Dysgeusia, Oral Ulcers, Periorbital Puffiness and Sore Throat.   Respiratory Not Present- Cough, Difficulty Breathing on Exertion, Dyspnea and Hemoptysis. Cardiovascular Not Present- Chest Pain, Claudications, Orthopnea, Palpitations, Paroxysmal Nocturnal Dyspnea and Swelling of Extremities. Gastrointestinal Not Present- Abdominal Pain, Abdominal Swelling, Constipation, Diarrhea, Hematochezia, Melena, Nausea and Vomiting. Male Genitourinary Not Present- Change in Urinary Stream, Dysuria, Frequency, Hematuria, Hesitancy, Nocturia and Urgency. Musculoskeletal Not Present- Joint Pain, Joint Redness, Joint Stiffness, Joint Swelling, Leg Cramps and Myalgia. Neurological Not Present- Dizziness, Headaches, Syncope and Trouble walking. Endocrine Not Present- Appetite Changes, Excessive Thirst, Polydipsia and Polyuria. Hematology Not Present- Abnormal Bleeding and Easy Bruising. Vitals Marrian Moritz Rice; 8/3/2022 3:40 PM)  8/3/2022 3:38 PM  Weight: 202.75 lb   Height: 69 in   Height was reported by patient. Body Surface Area: 2.08 m²   Body Mass Index: 29.94 kg/m²    Pain Level: 0/10    Temp.: 96.8° F    Pulse: 68 (Regular)    Resp.: 12 (Unlabored)    P. OX: 98% (Room air)  BP: 138/90(Sitting, Left Arm, Standard)      Assessment & Plan Geovanna Gutierrez MD; 8/4/2022 5:01 AM)  CKD (chronic kidney disease), stage IV (N18.4) <[id number]>  Impression: ,,  #1 chronic kidney disease stage IV, etiology FSGS secondary to hypertension nephrosclerosis, atherosclerotic disease , severe IFTA . His creatinine is 3.87 and GFR is 18 range. nephrotic proteinuria ~ 5 gms stable . continues on losartan 100mg max dose ,Patient counseled regarding avoiding NSAIDs  #2 essential hypertension, appears to have been uncontrolled.   #3 proteinuria,nephrotic  #5 sleep apnea on CPAP  #6 obesity    Plan:  1) increase Coreg 12 mg BID ( send a new script) , continue losartan 100mg daily  2) low K diet  3) decrease Kayexalate 30 gm 1 time weekly, K was 4.9  3) avoid NSAIDs  4) log BP daily , < 130/80  5) continue calcitriol 0.25 mcg daily , follow pth next visit  6) Hb at goal , monitor  7) mod exercise , avoid high purine diet    f/u in 3 mths with CKD labs      Current Plans  PTH INTACT (81057)  RENAL FUNCTION PANEL (42417)  Hypertension (I10)  Current Plans  CBC (75473)  Changed Carvedilol 12.5 MG Oral Tablet, 1 (one) Tablet two times daily, #180, 90 days starting 08/03/2022, Ref. x3.  Proteinuria (R80.9)  Current Plans  SPOT PROTEIN URINE (56031)  SPOT URINE CREATININE(56583)  Hyperkalemia (E87.5)  FSGS (focal segmental glomerulosclerosis) (N05.1)  Portal Access Education (Z71.9)  Current Plans  Pt Education - How to [facility] using Patient Portal and 3rd Party Apps: discussed with patient and provided information.   Overweight (BMI 25.0 to 29.9) (E66.3)  Current Plans  LIFESTYLE EDUCATION REGARDING DIET (84334)  Pt Education - Healthy Diet: Brief Version *: healthy diet  Signed by Nighat Hdz MD (8/4/2022 5:02 AM)

## 2022-11-02 ENCOUNTER — TELEPHONE (OUTPATIENT)
Dept: INTERNAL MEDICINE CLINIC | Age: 57
End: 2022-11-02

## 2022-11-02 NOTE — TELEPHONE ENCOUNTER
Pt calling re: getting a stress test.  Stated benjamin meza, no heart issues going on. He wants it as a preventative measure.

## 2022-12-02 DIAGNOSIS — E78.5 DYSLIPIDEMIA: ICD-10-CM

## 2022-12-06 RX ORDER — ATORVASTATIN CALCIUM 40 MG/1
TABLET, FILM COATED ORAL
Qty: 90 TABLET | Refills: 3 | Status: SHIPPED | OUTPATIENT
Start: 2022-12-06

## 2023-01-12 ENCOUNTER — DOCUMENTATION ONLY (OUTPATIENT)
Dept: NEPHROLOGY | Age: 58
End: 2023-01-12

## 2023-01-13 NOTE — PROGRESS NOTES
Tabitha Meyer  Appointment: 2022 3:30 PM  Location: [address] [place] [address] Office  Patient #: [id number]  : 1965  Undefined / Language: [place] Ill / Race: White  Male      History of Present Illness Romi Serrano MD; 2022 5:23 AM)  The patient is a 65year old male who presents for a Recheck of Chronic kidney disease. This is classified as stage 4. Note: Patient is a 79-year-old white male with history of longstanding hypertension, dyslipidemia bipolar disorder treated with lithium from  through year  and then stopped, arthritis, obstructive sleep apnea on CPAP, chronic kidney disease with nephrotic  proteinuria    Past medical history  #1 hypertension, essential  #2 dyslipidemia  #3 bipolar disorder  #4 history of lithium toxicity  #5 proteinuria  #6 chronic kidney disease stage IV with nephrotic proteinuria  #7 arthritis  #8 obesity  #9 colon adenoma  #10 hypoalbuminemia    PMH/Initial presentation  kidney functions have declined over the last 10 years. His GFR was in the 40 range about 10 years back which has progressively declined down to 20. His creatinine has increased to 3.2. Patient had been taking lithium ER  to get through  when he was realized to have elevated creatinine. Patient has been told that he has proteinuria I do not see any urine test suggesting quantification. He also had some lower extremity edema. Interval History  Denies any urinary symptoms.     no urinary symptoms  no edema, SOB  His blood pressure appears to be controlled  renal biopsy done in past: path results FSGS , HTN nephrosclerosis and advanced atherosclerotic disease, severe IFTA  creat 5.39  , gfr < 12  prot/creat ratio 6.4 gm , on losartan  K 5.0, not being taking Kayexalate  pth 107 on calcitriol  HB 10.5    Problem List/Past Medical (Cecil Chauhan; 2022 3:35 PM)  CKD (chronic kidney disease), stage IV (N18.4)    Overweight (BMI 25.0 to 29.9) (E66.3)    Hypertension (I10)    Hyperkalemia (E87.5) Proteinuria (R80.9)    Sleep apnea (G47.30)    FSGS (focal segmental glomerulosclerosis) (N05.1)    Bipolar disorder (F31.9)    Dyslipidemia (E78.5)    Problems Reconciled      Allergies (Dee Rice; 12/1/2022 3:35 PM)  No Known Drug Allergies   [08/09/2021]: Allergies Reconciled      Social History (Willma Imus; 12/1/2022 3:35 PM)  No Drug Use    Alcohol Use   Moderate alcohol use. Tobacco use   Never smoker. Medication History [facility] Imus; 12/1/2022 3:35 PM)  Losartan Potassium  (100MG Tablet, 1 (one) Oral at bedtime, Taken starting 03/21/2022) Active. Carvedilol  (12.5MG Tablet, 1 (one) Oral two times daily, Taken starting 08/03/2022) Active. Calcitriol  (0.25MCG Capsule, 1 (one) Oral daily, Taken starting 07/28/2022) Active. OLANZapine  (5MG Tablet, 2 Oral daily) Active. (ZYPREXA)  Vitamin D  (50 MCG(2000 UT) Capsule, 2 Oral daily) Active. lamoTRIgine  (100MG Tablet, 2 Oral two times daily) Active. (LAMICTAL)  Lipitor  (40MG Tablet, 1 Oral daily) Active. (atorvastatin)  Polyethylene Glycol 3350  (17GM/SCOOP Powder, Oral as needed) Active. (MIRALAX)  OXcarbazepine  (300MG Tablet, 2 Oral evening time) Active. (TRILEPTAL)  Medications Reconciled     Past Surgical History [facility] Imus; 12/1/2022 3:35 PM)  COLONOSCOPY   Dr. Valdez Mahmood 7/15 adenoma and divertics; Dr. Kayleigh Whittaker 2/25/21 divetics and adenomas  [address] Maintenance History ([facility] Imus; 12/1/2022 3:36 PM)  Flu Vaccine   [10/2022]:  Colonoscopy, Screening   [02/25/2021]: Normal.  Covid19 vaccine dates: 04/05/2021, 05/07/2021 & 11/17/2021      Other Problems ([facility] Im[facility]; 12/1/2022 3:35 PM)  Portal Access Education (Z71.9)          Review of Systems Kenneth Lucas MD; 12/6/2022 5:23 AM)  General Not Present- Anorexia, Chills, Fatigue and Fever. Skin Not Present- Bruising, Pruritus, Rash and Ulcer. HEENT Not Present- Dry Mucous Membranes, Dysgeusia, Oral Ulcers, Periorbital Puffiness and Sore Throat.   Respiratory Not Present- Cough, Difficulty Breathing on Exertion, Dyspnea and Hemoptysis. Cardiovascular Not Present- Chest Pain, Claudications, Orthopnea, Palpitations, Paroxysmal Nocturnal Dyspnea and Swelling of Extremities. Gastrointestinal Not Present- Abdominal Pain, Abdominal Swelling, Constipation, Diarrhea, Hematochezia, Melena, Nausea and Vomiting. Male Genitourinary Not Present- Change in Urinary Stream, Dysuria, Frequency, Hematuria, Hesitancy, Nocturia and Urgency. Musculoskeletal Not Present- Joint Pain, Joint Redness, Joint Stiffness, Joint Swelling, Leg Cramps and Myalgia. Neurological Not Present- Dizziness, Headaches, Syncope and Trouble walking. Endocrine Not Present- Appetite Changes, Excessive Thirst, Polydipsia and Polyuria. Hematology Not Present- Abnormal Bleeding and Easy Bruising. Vitals (Melodie Domínguez; 12/1/2022 3:38 PM)  12/1/2022 3:37 PM  Weight: 204 lb   Height: 69 in   Height was reported by patient. Body Surface Area: 2.08 m²   Body Mass Index: 30.13 kg/m²    Pain Level: 0/10    Temp.: 96.2° F    Pulse: 62 (Regular)    Resp.: 12 (Unlabored)    P. OX: 100% (Room air)  BP: 138/92(Sitting, Left Arm, Standard)              Physical Exam Ramón Santiago MD; 12/6/2022 5:24 AM)  General  General Appearance - Not in acute distress. Build & Nutrition - Well nourished and Well developed. Integumentary  General Characteristics  Overall examination of the patient's skin reveals - no rashes. Eye  Sclera/Conjunctiva - Bilateral - Nonicteric. ENMT  Mouth and Throat  Oral Cavity/Oropharynx - Gingiva - no ulcerations noted, No excessive growth of soft tissue present. Oral Mucosa - moist. Oropharynx - No presence of white exudate noted. Chest and Lung Exam  Auscultation  Breath sounds - Normal and Clear. Adventitious sounds - No Adventitious sounds. Cardiovascular  Cardiovascular examination reveals  - on palpation PMI is normal in location and amplitude, no palpable S3 or S4.  Normal cardiac borders. , normal heart sounds, regular rate and rhythm with no murmurs, carotid auscultation reveals no bruits, abdominal aorta auscultation reveals no bruits, normal pedal pulses bilaterally and no digital clubbing, cyanosis, edema, increased warmth or tenderness. Abdomen  Inspection  Inspection of the abdomen reveals - No Abnormal pulsations. Palpation/Percussion  Palpation and Percussion of the abdomen reveal - Soft, Non Tender, No hepatosplenomegaly and No Palpable abdominal masses. Auscultation  Auscultation of the abdomen reveals - Bowel sounds normal and No Abdominal bruits. Neurologic  Neurologic evaluation reveals  - alert and oriented x 3 with no impairment of recent or remote memory. Lymphatic  General Lymphatics  Description - No Cervical lymphadeopathy. Assessment & Plan Margie Bishop MD; 12/6/2022 5:45 AM)    CKD (chronic kidney disease), stage IV (N18.4) <[id number]>  Impression: ,,  #1 chronic kidney disease stage IV, etiology FSGS secondary to hypertension nephrosclerosis, atherosclerotic disease , severe IFTA . His creatinine is 5.3 and GFR is 12 range. nephrotic proteinuria ~ 6 gms, chronic stable . continues on losartan 100mg dose ,Patient counseled regarding avoiding NSAIDs  #2 essential hypertension, appears to have been uncontrolled.   #3 proteinuria,nephrotic  #5 sleep apnea on CPAP  #6 obesity    Plan:  1) increase Coreg 12 mg BID ( send a new script) , continue losartan 100mg daily for now  2) low K diet , check renal panel now to f/u on K to decide if needs kayexalate and if losartan needs to be held  3) avoid NSAIDs  4) log BP daily , < 130/80  5) continue calcitriol 0.25 mcg daily , follow pth next visit  6) Hb at goal , monitor  7) mod exercise , avoid high purine diet  8) f/u in BMP as above  9) dialysis education    f/u in 2 mths with CKD labs    Current Plans  PTH INTACT (14680)  RENAL FUNCTION PANEL (13565)    Hypertension (I10)    Current Plans  CBC (10991)  Started amLODIPine Besylate 5 MG Oral Tablet, 1 (one) Tablet daily, #90, 90 days starting 12/01/2022, Ref. x1.    Proteinuria (R80.9)    Current Plans  SPOT PROTEIN URINE (25452)  SPOT URINE CREATININE(89572)    Hyperkalemia (E87.5)      Portal Access Education (Z71.9)    Current Plans  Pt Education - How to [address] using Patient Portal and 3rd Party Apps: discussed with patient and provided information.     Obesity with body mass index 30 or greater (E66.9)    Current Plans  LIFESTYLE EDUCATION REGARDING DIET (99212)  Pt Education - Healthy Diet: Brief Version *: healthy diet  Signed by Duane Sutherland MD (12/6/2022 5:46 AM)

## 2023-01-15 NOTE — PROGRESS NOTES
65 y.o. white male who presents for RPE    No cardiovascular complaints. He is doing some light weights but has not been regular with the walking. Weight is up some through the holidays     Vitals 1/25/2023   Weight 205 lb     Vitals 7/21/2022 1/10/2022 1/10/2022   Weight 197 lb  191 lb     Vitals 6/24/2021 12/17/2020 12/9/2019 2/19/2019   Weight 200 lb 215 lb 224 lb 214 lb     No gi or gu complaints. No new psych issues, continues to see Dr Smita Cali on cpap and doing well    He has appt with Dr Jacques Bhandari in Feb.  He is now expressing interest in possibly getting renal transplant.   Reports good energy level, good mental clarity    Past Medical History:   Diagnosis Date    Bipolar disorder ([facility]     Dr. Estefani Schwartz since 1984    CKD (chronic kidney disease) 2007    Dr. Ubaldo Huston; Dr Jacques Bhandari 2021 bx FSGS    Colon adenoma     and diverticulosis 7/15 Dr Slade Hollingsworth    COVID-19 virus infection 09/2021    Degenerative arthritis of spine 01/2016    on t and l spine films YOBANI    Dyslipidemia     calculated 10 yr risk score was 5.9% (4/14); 5.4% (11/14); 4.3% (5/15); 6.7% (5/16); 7.3% (11/16)    ED (erectile dysfunction) 08/10/2017    H/O cardiovascular stress test     ETT neg 2004; ETT neg 2009    Hypertension     IFG (impaired fasting glucose) 11/23/2015    Lumbar radiculitis 2020    on emg Dr [facility]    Myofascial pain 2016    Dr Julia Villalobos    Obesity     IF 11/18 start weight 222 lbs    OTIS on CPAP     Dr Hari Lemon    Proteinuria 2011    Subacromial bursitis     right Dr. Ericka Flores     Past Surgical History:   Procedure Laterality Date    HX COLONOSCOPY      Dr Slade Hollingsworth (7/15) adenoma and divertics; Dr Henry Blue (2/25/21) divertics and adenomas     Social History     Socioeconomic History    Marital status:      Spouse name: Not on file    Number of children: 1    Years of education: Not on file    Highest education level: Not on file   Occupational History    Occupation: [address]   Tobacco Use    Smoking status: Never Smokeless tobacco: Never   Vaping Use    Vaping Use: Never used   Substance and Sexual Activity    Alcohol use: Yes     Comment: social    Drug use: No    Sexual activity: Not Currently   Other Topics Concern    Not on file   Social History Narrative    Not on file     Social Determinants of Health     Financial Resource Strain: Not on file   Food Insecurity: Not on file   Transportation Needs: Not on file   Physical Activity: Not on file   Stress: Not on file   Social Connections: Not on file   Intimate Partner Violence: Not on file   Housing Stability: Not on file     Current Outpatient Medications   Medication Sig    amLODIPine (NORVASC) 5 mg tablet Take 5 mg by mouth daily. carvediloL (COREG) 12.5 mg tablet Take 12.5 mg by mouth two (2) times a day.    ketoconazole (NIZORAL) 2 % shampoo Apply 5 mL to affected area as needed for Itching. atorvastatin (LIPITOR) 40 mg tablet take 1 tablet by mouth once daily    losartan (COZAAR) 100 mg tablet Take 100 mg by mouth nightly. OXcarbazepine (TRILEPTAL) 300 mg tablet Take  by mouth. OLANZapine (ZyPREXA) 5 mg tablet Take 5 mg by mouth two (2) times a day. Takes one in am , one in pm    cholecalciferol (VITAMIN D3) (2,000 UNITS /50 MCG) cap capsule Take  by mouth.    lamoTRIgine (LAMICTAL) 100 mg tablet Take 100 mg by mouth as directed. Pt takes 200mg in the am and 200mg in the pm    polyethylene glycol (MIRALAX) 17 gram packet Take 17 g by mouth daily. (Patient not taking: Reported on 1/25/2023)     No current facility-administered medications for this visit.      No Known Allergies    REVIEW OF SYSTEMS: colo 2/21 Dr Suzanna Keene  Ophtho - no vision change or eye pain  Oral - no mouth pain, tongue or tooth problems  Ears - no hearing loss, ear pain, fullness, no swallowing problems  Cardiac - no CP, PND, orthopnea, edema, palpitations or syncope  Chest - no breast masses  Resp - no wheezing, chronic coughing, dyspnea  GI - no heartburn, nausea, vomiting, change in bowel habits, bleeding, hemorrhoids  Urinary - no dysuria, hematuria, flank pain, urgency, frequency    Visit Vitals  /71   Pulse 69   Temp 97.6 °F (36.4 °C) (Temporal)   Resp 18   Ht 5' 9\" (1.753 m)   Wt 205 lb (93 kg)   SpO2 98%   BMI 30.27 kg/m²   A&O x3  Affect is appropriate. Mood stable  No apparent distress  Anicteric, no JVD, adenopathy or thyromegaly. No carotid bruits or radiated murmur  Lungs clear to auscultation, no wheezes or rales  Heart showed regular rate and rhythm. No murmur, rubs, gallops  Abdomen soft nontender, no hepatosplenomegaly or masses. Extremities without edema.   Pulses 1-2+ symmetrically  No asterixis    LABS  From 11/09 showed gluc 102, cr 1.80, gfr 44,          chol 215, tg 350, hdl 34, ldl-c 111  From 1/10 showed       alt 30,         chol 200, tg 240, hdl 33, ldl-c 119  From 6/11 showed   gluc 86,   cr 2.30,    alt 17,         chol 198, tg 134, hdl 38, ldl-c 133, wbc 2.8, hb 12.4, plt 190, psa 0.40  From 12/11 showed gluc 95,   cr 2.01, gfr 39,   ldl-p 1438, chol 201, tg 144, hdl 48, ldl-c 124,               24hUpr 474  From 6/12 showed       ldl-p 1611, chol 228, tg 231, hdl 39, ldl-c 143  From 5/14 showed   gluc 96,   cr 1.95, gfr 37, alt 13,         chol 242, tg 450, hdl 32, ldl-c na,   wbc 6.1, hb 13.2, plt 229, psa 0.30, tsh 1.83,  vit d 34.7  From 11/14 showed              chol 250, tg 340, hdl 35, ldl-c 147  From 5/15 showed   gluc 97,   cr 2.29, gfr 31,   hba1c 5.7, chol 215, tg 255, hdl 36, ldl-c 128  From 11/15 showed gluc 104, cr 2.14, gfr 35,   hba1c 5.6,             tsh 1.62  From 5/16 showed   gluc 92,   cr 2.11, gfr 34,          chol 252, tg 438, hdl 35, ldl-c na,   wbc 5.2, hb 13.6, plt 233  From 11/16 showed       hba1c 5.1, chol 254, tg 321, hdl 39, ldl-c 151  From 11/17 showed gluc 90,   cr 2.31, gfr 32, alt 24,         chol 278, tg 342, hdl 38, ldl-c 182, wbc 6.3, hb 13.6, plt 253  From 11/18 showed gluc 102, cr 2.44, gfr 28, alt 36, chol 250, tg 436, hdl 37, ldl-c na,   wbc 5.9, hb 12.7, plt 268, psa 0.40  From 5/19 showed                                     24h Upr 1887  From 9/19 showed                             24h Upr 2237  From 11/19 showed gluc 105, cr 2.83, gfr 24, alt 32,         chol 297, tg 330, hdl 39, ldl-c 192, wbc 6.0, hb 12.5, plt 270  From 12/20 showed gluc 87,   cr 3.30, gfr 20, alt 23, hba1c 5.3, chol 231, tg 362, hdl 41, ldl-c 118, wbc 6.6, hb 12.3, plt 274,               fe 64, %sat 21, ferritin 169, b12 424, fol 13.8  From 2/21 showed     cr 2.93  From 6/21 showed       alt 42, hba1c 5.1, chol 200, tg 310, hdl 45, ldl-c 101  From 10/21 showed gluc 100, cr 3.44, gfr 19,          wbc 5.7, hb 11.6, plt 277,               24hUpr 3870  From 1/22 showed   gluc 92,   cr 3.81, gfr 17, alt 24, hba1c 5.2, chol 190, tg 203, hdl 58, ldl-c 91,  wbc 6.1, hb 11.6, plt 281,               fe 64, %sat 21, ferritin 204, spep neg  From 7/22 showed   gluc 87,   cr 3.94, gfr 16, alt 19, hba1c 5.2    Results for orders placed or performed during the hospital encounter of 01/16/23   CBC W/O DIFF   Result Value Ref Range    WBC 6.0 4.6 - 13.2 K/uL    RBC 3.40 (L) 4.35 - 5.65 M/uL    HGB 9.4 (L) 13.0 - 16.0 g/dL    HCT 29.1 (L) 36.0 - 48.0 %    MCV 85.6 78.0 - 100.0 FL    MCH 27.6 24.0 - 34.0 PG    MCHC 32.3 31.0 - 37.0 g/dL    RDW 14.1 11.6 - 14.5 %    PLATELET 240 585 - 649 K/uL    MPV 9.6 9.2 - 11.8 FL    NRBC 0.0 0  WBC    ABSOLUTE NRBC 0.00 0.00 - 0.01 K/uL   LIPID PANEL   Result Value Ref Range    LIPID PROFILE          Cholesterol, total 197 <200 MG/DL    Triglyceride 261 (H) <150 MG/DL    HDL Cholesterol 42 40 - 60 MG/DL    LDL, calculated 102.8 (H) 0 - 100 MG/DL    VLDL, calculated 52.2 MG/DL    CHOL/HDL Ratio 4.7 0 - 5.0     METABOLIC PANEL, COMPREHENSIVE   Result Value Ref Range    Sodium 144 136 - 145 mmol/L    Potassium 4.7 3.5 - 5.5 mmol/L    Chloride 111 100 - 111 mmol/L    CO2 24 21 - 32 mmol/L    Anion gap 9 3.0 - 18 mmol/L Glucose 101 (H) 74 - 99 mg/dL    BUN 77 (H) 7.0 - 18 MG/DL    Creatinine 6.65 (H) 0.6 - 1.3 MG/DL    BUN/Creatinine ratio 12 12 - 20      eGFR 9 (L) >60 ml/min/1.73m2    Calcium 8.9 8.5 - 10.1 MG/DL    Bilirubin, total 0.3 0.2 - 1.0 MG/DL    ALT (SGPT) 27 16 - 61 U/L    AST (SGOT) 15 10 - 38 U/L    Alk. phosphatase 97 45 - 117 U/L    Protein, total 6.0 (L) 6.4 - 8.2 g/dL    Albumin 3.6 3.4 - 5.0 g/dL    Globulin 2.4 2.0 - 4.0 g/dL    A-G Ratio 1.5 0.8 - 1.7     PSA SCREENING (SCREENING)   Result Value Ref Range    Prostate Specific Ag 0.7 0.0 - 4.0 ng/mL   HEMOGLOBIN A1C WITH EAG   Result Value Ref Range    Hemoglobin A1c 5.1 4.2 - 5.6 %    Est. average glucose 100 mg/dL     We reviewed the patient's labs from the last several visits to point out trends in the numbers            Patient Active Problem List   Diagnosis Code    Bipolar disorder Dr. Cristofer Engle F31.9    Dyslipidemia E78.5    Primary hypertension I10    OTIS on CPAP Dr Neva Fuentes G47.33, Z99.89    Colon adenoma 7/15 Dr Owen Wadsworth D12.6    IFG (impaired fasting glucose) R73.01    Overweight (BMI 25.0-29. 9) E66.3    CKD (chronic kidney disease) stage 4, GFR 15-29 ml/min (HCC) N18.4    Erectile dysfunction N52.9     Assessment and plan:  1. BPD. Continue current and f/u Dr. Jose Hayes  2. CKD5 and proteinuria. Follow up Dr Anabela Thompson as scheduled   quick discussion on transplant in general.  Will forward records/labs  3. Dyslipidemia. At target on lipitor  4. HTN. Dr Anabela Thompson is managing  5. Obesity. Continue lifestyle and dietary measures, portion control  6. IFG. Continue to follow   7. OTIS on cpap. F/U Dr Neva Fuentes  8. Colon adenoma. Fiber, colo 2024 per pt  9. Anemia. Discussed aocd w ckd, per Dr Anabela Thompson        RTC 7/23    Above conditions discussed at length and patient vocalized understanding. All questions answered to patient satisfaction      ICD-10-CM ICD-9-CM    1. CKD (chronic kidney disease) stage 5, GFR less than 15 ml/min ([facility])  N18.5 585.5       2.  Bipolar disorder, in full remission, [facility]  F31.78 296.66       3. Colon adenoma 7/15 Dr Joon Sevilla  D12.6 211.3       4. Dyslipidemia  E78.5 272.4 LIPID PANEL      5. IFG (impaired fasting glucose)  R73.01 790.21 HEMOGLOBIN A1C WITH EAG      6. Overweight (BMI 25.0-29. 9)  E66.3 278.02       7. Primary hypertension  I10 401.9       8.  Seborrhea  L21.9 706.3 ketoconazole (NIZORAL) 2 % shampoo

## 2023-01-16 ENCOUNTER — HOSPITAL ENCOUNTER (OUTPATIENT)
Dept: LAB | Age: 58
Discharge: HOME | End: 2023-01-16
Payer: COMMERCIAL

## 2023-01-16 ENCOUNTER — APPOINTMENT (OUTPATIENT)
Dept: INTERNAL MEDICINE CLINIC | Age: 58
End: 2023-01-16

## 2023-01-16 DIAGNOSIS — I10 PRIMARY HYPERTENSION: ICD-10-CM

## 2023-01-16 DIAGNOSIS — R73.01 IFG (IMPAIRED FASTING GLUCOSE): ICD-10-CM

## 2023-01-16 DIAGNOSIS — Z12.5 SCREENING FOR MALIGNANT NEOPLASM OF PROSTATE: ICD-10-CM

## 2023-01-16 DIAGNOSIS — E78.5 DYSLIPIDEMIA: ICD-10-CM

## 2023-01-16 LAB
ALBUMIN SERPL-MCNC: 3.6 G/DL (ref 3.4–5)
ALBUMIN/GLOB SERPL: 1.5 (ref 0.8–1.7)
ALP SERPL-CCNC: 97 U/L (ref 45–117)
ALT SERPL-CCNC: 27 U/L (ref 16–61)
ANION GAP SERPL CALC-SCNC: 9 MMOL/L (ref 3–18)
AST SERPL-CCNC: 15 U/L (ref 10–38)
BILIRUB SERPL-MCNC: 0.3 MG/DL (ref 0.2–1)
BUN SERPL-MCNC: 77 MG/DL (ref 7–18)
BUN/CREAT SERPL: 12 (ref 12–20)
CALCIUM SERPL-MCNC: 8.9 MG/DL (ref 8.5–10.1)
CHLORIDE SERPL-SCNC: 111 MMOL/L (ref 100–111)
CHOLEST SERPL-MCNC: 197 MG/DL
CO2 SERPL-SCNC: 24 MMOL/L (ref 21–32)
CREAT SERPL-MCNC: 6.65 MG/DL (ref 0.6–1.3)
ERYTHROCYTE [DISTWIDTH] IN BLOOD BY AUTOMATED COUNT: 14.1 % (ref 11.6–14.5)
EST. AVERAGE GLUCOSE BLD GHB EST-MCNC: 100 MG/DL
GLOBULIN SER CALC-MCNC: 2.4 G/DL (ref 2–4)
GLUCOSE SERPL-MCNC: 101 MG/DL (ref 74–99)
HBA1C MFR BLD: 5.1 % (ref 4.2–5.6)
HCT VFR BLD AUTO: 29.1 % (ref 36–48)
HDLC SERPL-MCNC: 42 MG/DL (ref 40–60)
HDLC SERPL: 4.7 (ref 0–5)
HGB BLD-MCNC: 9.4 G/DL (ref 13–16)
LDLC SERPL CALC-MCNC: 102.8 MG/DL (ref 0–100)
LIPID PROFILE,FLP: ABNORMAL
MCH RBC QN AUTO: 27.6 PG (ref 24–34)
MCHC RBC AUTO-ENTMCNC: 32.3 G/DL (ref 31–37)
MCV RBC AUTO: 85.6 FL (ref 78–100)
NRBC # BLD: 0 K/UL (ref 0–0.01)
NRBC BLD-RTO: 0 PER 100 WBC
PLATELET # BLD AUTO: 314 K/UL (ref 135–420)
PMV BLD AUTO: 9.6 FL (ref 9.2–11.8)
POTASSIUM SERPL-SCNC: 4.7 MMOL/L (ref 3.5–5.5)
PROT SERPL-MCNC: 6 G/DL (ref 6.4–8.2)
PSA SERPL-MCNC: 0.7 NG/ML (ref 0–4)
RBC # BLD AUTO: 3.4 M/UL (ref 4.35–5.65)
SODIUM SERPL-SCNC: 144 MMOL/L (ref 136–145)
TRIGL SERPL-MCNC: 261 MG/DL (ref ?–150)
VLDLC SERPL CALC-MCNC: 52.2 MG/DL
WBC # BLD AUTO: 6 K/UL (ref 4.6–13.2)

## 2023-01-16 PROCEDURE — 85027 COMPLETE CBC AUTOMATED: CPT

## 2023-01-16 PROCEDURE — 80053 COMPREHEN METABOLIC PANEL: CPT

## 2023-01-16 PROCEDURE — 84153 ASSAY OF PSA TOTAL: CPT

## 2023-01-16 PROCEDURE — 36415 COLL VENOUS BLD VENIPUNCTURE: CPT

## 2023-01-16 PROCEDURE — 80061 LIPID PANEL: CPT

## 2023-01-16 PROCEDURE — 83036 HEMOGLOBIN GLYCOSYLATED A1C: CPT

## 2023-01-23 NOTE — PROGRESS NOTES
Garcia Zhang presents today for   Chief Complaint   Patient presents with    Follow-up    Labs     1-16-23    Chronic Kidney Disease     Stage 4    Hypertension     Primary     Blood sugar problem     IFG    Cholesterol Problem     Dyslipidemia      1. \"Have you been to the ER, urgent care clinic since your last visit? Hospitalized since your last visit? \" no    2. \"Have you seen or consulted any other health care providers outside of the [address] [place] [address] since your last visit? \" Yes      3. For patients aged 39-70: Has the patient had a colonoscopy / FIT/ Cologuard? Yes - no Care Gap present      If the patient is female:    4. For patients aged 41-77: Has the patient had a mammogram within the past 2 years? NA - based on age or sex  See top three    5. For patients aged 21-65: Has the patient had a pap smear?  NA - based on age or sex

## 2023-01-25 ENCOUNTER — OFFICE VISIT (OUTPATIENT)
Dept: INTERNAL MEDICINE CLINIC | Age: 58
End: 2023-01-25
Payer: COMMERCIAL

## 2023-01-25 VITALS
HEIGHT: 69 IN | RESPIRATION RATE: 18 BRPM | HEART RATE: 69 BPM | TEMPERATURE: 97.6 F | SYSTOLIC BLOOD PRESSURE: 115 MMHG | WEIGHT: 205 LBS | BODY MASS INDEX: 30.36 KG/M2 | OXYGEN SATURATION: 98 % | DIASTOLIC BLOOD PRESSURE: 71 MMHG

## 2023-01-25 DIAGNOSIS — R73.01 IFG (IMPAIRED FASTING GLUCOSE): ICD-10-CM

## 2023-01-25 DIAGNOSIS — E66.3 OVERWEIGHT (BMI 25.0-29.9): ICD-10-CM

## 2023-01-25 DIAGNOSIS — D12.6 COLON ADENOMA: ICD-10-CM

## 2023-01-25 DIAGNOSIS — N18.5 CKD (CHRONIC KIDNEY DISEASE) STAGE 5, GFR LESS THAN 15 ML/MIN (HCC): Primary | ICD-10-CM

## 2023-01-25 DIAGNOSIS — E78.5 DYSLIPIDEMIA: ICD-10-CM

## 2023-01-25 DIAGNOSIS — L21.9 SEBORRHEA: ICD-10-CM

## 2023-01-25 DIAGNOSIS — I10 PRIMARY HYPERTENSION: ICD-10-CM

## 2023-01-25 DIAGNOSIS — F31.78 BIPOLAR DISORDER, IN FULL REMISSION, MOST RECENT EPISODE MIXED (HCC): ICD-10-CM

## 2023-01-25 RX ORDER — KETOCONAZOLE 20 MG/ML
5 SHAMPOO, SUSPENSION TOPICAL AS NEEDED
Qty: 1 EACH | Refills: 5 | Status: SHIPPED | OUTPATIENT
Start: 2023-01-25

## 2023-01-25 RX ORDER — CARVEDILOL 12.5 MG/1
12.5 TABLET ORAL 2 TIMES DAILY
COMMUNITY
Start: 2022-10-25

## 2023-01-25 RX ORDER — AMLODIPINE BESYLATE 5 MG/1
5 TABLET ORAL DAILY
COMMUNITY
Start: 2022-12-01

## 2023-01-27 ENCOUNTER — TELEPHONE (OUTPATIENT)
Dept: INTERNAL MEDICINE CLINIC | Age: 58
End: 2023-01-27

## 2023-01-27 NOTE — TELEPHONE ENCOUNTER
Patient called in want to speak to Dr. Jerrell Gaines about what was discuss about his anemia. What would the treatment be for this issues.  Please Advise     Patient can be reached at [phone]

## 2023-01-30 NOTE — TELEPHONE ENCOUNTER
As we discussed at his visit, likely due to his CKD5/renal disease    We had done the workup in 1/22 and was neg/normal    Dr Pink Goldberg will most likely repeat those tests and probably come back ok again when he sees him    Most pts will end up on EPO - this has to be ordered/given by nephrology and they make the call as to when it's given so will need to discuss with Dr Pink Goldberg

## 2023-02-07 DIAGNOSIS — E78.5 DYSLIPIDEMIA: Primary | ICD-10-CM

## 2023-03-14 ENCOUNTER — CASE MANAGEMENT (OUTPATIENT)
Facility: HOSPITAL | Age: 58
End: 2023-03-14

## 2023-03-15 ENCOUNTER — TELEPHONE (OUTPATIENT)
Age: 58
End: 2023-03-15

## 2023-03-15 NOTE — TELEPHONE ENCOUNTER
Patient stating he has a painful bump between his thumb and forefinger that has been there for quite a while. This morning he woke up with a burning pain in his wrist. Wants top know if Dr. Kimberley Arreola can refer him to someone.

## 2023-03-15 NOTE — TELEPHONE ENCOUNTER
Suggest going to urgent care and have them define the problem - we have no spots  Then we can send to ortho, derm, etc if needed

## 2023-03-20 NOTE — PROGRESS NOTES
sounds. Cardiovascular  Cardiovascular examination reveals  - on palpation PMI is normal in location and amplitude, no palpable S3 or S4. Normal cardiac borders. , normal heart sounds, regular rate and rhythm with no murmurs, carotid auscultation reveals no bruits, abdominal aorta auscultation reveals no bruits, normal pedal pulses bilaterally and no digital clubbing, cyanosis, edema, increased warmth or tenderness. Abdomen  Inspection  Inspection of the abdomen reveals - No Abnormal pulsations. Palpation/Percussion  Palpation and Percussion of the abdomen reveal - Soft, Non Tender, No hepatosplenomegaly and No Palpable abdominal masses. Auscultation  Auscultation of the abdomen reveals - Bowel sounds normal and No Abdominal bruits. Neurologic  Neurologic evaluation reveals  - alert and oriented x 3 with no impairment of recent or remote memory. Lymphatic  General Lymphatics  Description - No Cervical lymphadeopathy. Assessment & Plan Zara Quintanilla MD; 3/20/2023 5:59 AM)    CKD (chronic kidney disease), stage IV (N18.4) <[id number]>  Impression: ,,  #1 chronic kidney disease stage IV, etiology FSGS secondary to hypertension nephrosclerosis, atherosclerotic disease , severe IFTA . His creatinine is 6.1 and GFR is 10 range. nephrotic proteinuria ~ 6 gms, chronic stable . continues on losartan 100mg dose , no indication of dialysis , but patient [facility] had dialysis [facility]  #2 essential hypertension, appears to be controlled.   #3 proteinuria,nephrotic  #5 sleep apnea on CPAP  #6 obesity    Plan:  1) continue Coreg 12 mg BID , continue losartan 100mg daily for now  2) low K diet , Kayexalate prn  3) avoid NSAIDs  4) log BP daily , < 130/80  5) continue calcitriol 0.25 mcg daily  6) refer to [facility] for anemia of CKD  7) mod exercise , avoid high purine diet  8) refer to vascular for AVF evaluation    f/u in 2-3 mths with CKD labs  please cc with thanks to Dr Jennifer Morgan

## 2023-04-17 ENCOUNTER — TELEPHONE (OUTPATIENT)
Age: 58
End: 2023-04-17

## 2023-04-17 NOTE — TELEPHONE ENCOUNTER
Pt called with concerns -   Pt states he is having black stools for the past 4 weeks no other symptoms he is asking if he should get a colonoscopy and if so he will need a referral   Please advise

## 2023-04-18 ENCOUNTER — OFFICE VISIT (OUTPATIENT)
Age: 58
End: 2023-04-18
Payer: COMMERCIAL

## 2023-04-18 VITALS
BODY MASS INDEX: 31.1 KG/M2 | TEMPERATURE: 98.3 F | HEART RATE: 71 BPM | RESPIRATION RATE: 19 BRPM | OXYGEN SATURATION: 98 % | DIASTOLIC BLOOD PRESSURE: 76 MMHG | HEIGHT: 69 IN | SYSTOLIC BLOOD PRESSURE: 138 MMHG | WEIGHT: 210 LBS

## 2023-04-18 DIAGNOSIS — R19.5 DARK STOOLS: Primary | ICD-10-CM

## 2023-04-18 DIAGNOSIS — D64.9 ANEMIA, UNSPECIFIED TYPE: ICD-10-CM

## 2023-04-18 PROCEDURE — 99213 OFFICE O/P EST LOW 20 MIN: CPT | Performed by: INTERNAL MEDICINE

## 2023-04-18 PROCEDURE — 3078F DIAST BP <80 MM HG: CPT | Performed by: INTERNAL MEDICINE

## 2023-04-18 PROCEDURE — 3075F SYST BP GE 130 - 139MM HG: CPT | Performed by: INTERNAL MEDICINE

## 2023-04-18 RX ORDER — AMLODIPINE BESYLATE 5 MG/1
5 TABLET ORAL DAILY
COMMUNITY
Start: 2023-02-26

## 2023-04-18 RX ORDER — MULTIVIT WITH MINERALS/LUTEIN
250 TABLET ORAL DAILY
COMMUNITY

## 2023-04-18 RX ORDER — LANOLIN ALCOHOL/MO/W.PET/CERES
325 CREAM (GRAM) TOPICAL DAILY
COMMUNITY

## 2023-04-18 RX ORDER — FUROSEMIDE 40 MG/1
40 TABLET ORAL 2 TIMES DAILY
COMMUNITY
Start: 2023-04-05

## 2023-04-18 SDOH — ECONOMIC STABILITY: INCOME INSECURITY: HOW HARD IS IT FOR YOU TO PAY FOR THE VERY BASICS LIKE FOOD, HOUSING, MEDICAL CARE, AND HEATING?: NOT HARD AT ALL

## 2023-04-18 SDOH — ECONOMIC STABILITY: FOOD INSECURITY: WITHIN THE PAST 12 MONTHS, THE FOOD YOU BOUGHT JUST DIDN'T LAST AND YOU DIDN'T HAVE MONEY TO GET MORE.: NEVER TRUE

## 2023-04-18 SDOH — ECONOMIC STABILITY: HOUSING INSECURITY
IN THE LAST 12 MONTHS, WAS THERE A TIME WHEN YOU DID NOT HAVE A STEADY PLACE TO SLEEP OR SLEPT IN A SHELTER (INCLUDING NOW)?: NO

## 2023-04-18 SDOH — ECONOMIC STABILITY: FOOD INSECURITY: WITHIN THE PAST 12 MONTHS, YOU WORRIED THAT YOUR FOOD WOULD RUN OUT BEFORE YOU GOT MONEY TO BUY MORE.: NEVER TRUE

## 2023-04-18 ASSESSMENT — PATIENT HEALTH QUESTIONNAIRE - PHQ9
6. FEELING BAD ABOUT YOURSELF - OR THAT YOU ARE A FAILURE OR HAVE LET YOURSELF OR YOUR FAMILY DOWN: 0
1. LITTLE INTEREST OR PLEASURE IN DOING THINGS: 0
3. TROUBLE FALLING OR STAYING ASLEEP: 0
8. MOVING OR SPEAKING SO SLOWLY THAT OTHER PEOPLE COULD HAVE NOTICED. OR THE OPPOSITE, BEING SO FIGETY OR RESTLESS THAT YOU HAVE BEEN MOVING AROUND A LOT MORE THAN USUAL: 0
4. FEELING TIRED OR HAVING LITTLE ENERGY: 0
9. THOUGHTS THAT YOU WOULD BE BETTER OFF DEAD, OR OF HURTING YOURSELF: 0
7. TROUBLE CONCENTRATING ON THINGS, SUCH AS READING THE NEWSPAPER OR WATCHING TELEVISION: 0
SUM OF ALL RESPONSES TO PHQ QUESTIONS 1-9: 0
5. POOR APPETITE OR OVEREATING: 0
10. IF YOU CHECKED OFF ANY PROBLEMS, HOW DIFFICULT HAVE THESE PROBLEMS MADE IT FOR YOU TO DO YOUR WORK, TAKE CARE OF THINGS AT HOME, OR GET ALONG WITH OTHER PEOPLE: 0
SUM OF ALL RESPONSES TO PHQ QUESTIONS 1-9: 0
SUM OF ALL RESPONSES TO PHQ9 QUESTIONS 1 & 2: 0
2. FEELING DOWN, DEPRESSED OR HOPELESS: 0

## 2023-04-18 NOTE — PROGRESS NOTES
65 y.o. male who presents for evaluation. He was doing ok in January when we saw him last,. In the interim, he saw Dr Dalia Oh who started him on fe supp. Since then, his stools have been darker. Denied any abd pain, n/v, heartburn, diarrhea, wt loss. Yesterday, he called saying his stools were black so in for evaluation today. No h/o pud, last colo 2/21 as below. Denies any nsaid use but he does drink a bit of coffee.       Past Medical History:   Diagnosis Date    Bipolar disorder [facility])     Dr. Scotty Carmona since 1984    CKD (chronic kidney disease) 2007    Dr. Wild Collins; Dr Dalia hO 2021 bx FSGS    Colon adenoma     and diverticulosis 7/15 Dr Sindy Connelly    COVID-19 virus infection 09/2021    Degenerative arthritis of spine 01/2016    on t and l spine films SARTHAK    Dyslipidemia     calculated 10 yr risk score was 5.9% (4/14); 5.4% (11/14); 4.3% (5/15); 6.7% (5/16); 7.3% (11/16)    ED (erectile dysfunction) 08/10/2017    H/O cardiovascular stress test     ETT neg 2004; ETT neg 2009    Hypertension     IFG (impaired fasting glucose) 11/23/2015    Lumbar radiculitis 2020    on emg Dr Asha Vo    Myofascial pain 2016    Dr Sheri Hess    Obesity     IF 11/18 start weight 222 lbs    LALITO on CPAP     Dr Shira Garcia    Proteinuria 2011    Subacromial bursitis     right Dr. Hunter Hyman     Current Outpatient Medications   Medication Sig    amLODIPine (NORVASC) 5 MG tablet Take 1 tablet by mouth daily    furosemide (LASIX) 40 MG tablet Take 1 tablet by mouth 2 times daily    Multiple Vitamin (MULTI VITAMIN DAILY PO) Take by mouth    Ascorbic Acid (VITAMIN C) 250 MG tablet Take 1 tablet by mouth daily    ferrous sulfate (FE TABS 325) 325 (65 Fe) MG EC tablet Take 1 tablet by mouth daily    atorvastatin (LIPITOR) 40 MG tablet take 1 tablet by mouth once daily    calcitRIOL (ROCALTROL) 0.25 MCG capsule Take 1 capsule by mouth daily    carvedilol (COREG) 12.5 MG tablet Take 1 tablet by mouth 2 times daily ceived the following from [address]

## 2023-04-18 NOTE — PROGRESS NOTES
Mohit Aranda presents today for   Chief Complaint   Patient presents with    Stool Color Change     Patient's stool has been black in color for 4 weeks. Patient denies any abdominal cramping. 1. \"Have you been to the ER, urgent care clinic since your last visit? Hospitalized since your last visit? \" no    2. \"Have you seen or consulted any other health care providers outside of the [address] [place] [address] since your last visit? \" no     3. For patients aged 39-70: Has the patient had a colonoscopy / FIT/ Cologuard? Yes - no Care Gap present      If the patient is female:    4. For patients aged 41-77: Has the patient had a mammogram within the past 2 years? NA - based on age or sex      11. For patients aged 21-65: Has the patient had a pap smear?  NA - based on age or sex

## 2023-04-19 ENCOUNTER — TELEPHONE (OUTPATIENT)
Age: 58
End: 2023-04-19

## 2023-04-19 NOTE — TELEPHONE ENCOUNTER
----- Message from Karen Brooks MD sent at 4/18/2023  1:31 PM EDT -----  Pls get the last labs from [address] [place] [address] calendar year 2023

## 2023-05-09 ENCOUNTER — HOSPITAL ENCOUNTER (OUTPATIENT)
Facility: HOSPITAL | Age: 58
Discharge: HOME | End: 2023-05-12
Payer: COMMERCIAL

## 2023-05-09 DIAGNOSIS — N18.6 ESRD (END STAGE RENAL DISEASE) (HCC): ICD-10-CM

## 2023-05-09 DIAGNOSIS — E78.5 DYSLIPIDEMIA: ICD-10-CM

## 2023-05-09 DIAGNOSIS — D64.9 ANEMIA, UNSPECIFIED TYPE: ICD-10-CM

## 2023-05-09 DIAGNOSIS — R19.5 DARK STOOLS: ICD-10-CM

## 2023-05-09 LAB
ALBUMIN SERPL-MCNC: 3.4 G/DL (ref 3.4–5)
ALBUMIN/GLOB SERPL: 1.3 (ref 0.8–1.7)
ALP SERPL-CCNC: 111 U/L (ref 45–117)
ALT SERPL-CCNC: 24 U/L (ref 16–61)
ANION GAP SERPL CALC-SCNC: 9 MMOL/L (ref 3–18)
AST SERPL-CCNC: 16 U/L (ref 10–38)
BASOPHILS # BLD: 0 K/UL (ref 0–0.1)
BASOPHILS NFR BLD: 0 % (ref 0–2)
BILIRUB SERPL-MCNC: 0.4 MG/DL (ref 0.2–1)
BUN SERPL-MCNC: 72 MG/DL (ref 7–18)
BUN/CREAT SERPL: 10 (ref 12–20)
CALCIUM SERPL-MCNC: 9.4 MG/DL (ref 8.5–10.1)
CHLORIDE SERPL-SCNC: 100 MMOL/L (ref 100–111)
CO2 SERPL-SCNC: 24 MMOL/L (ref 21–32)
CREAT SERPL-MCNC: 7.45 MG/DL (ref 0.6–1.3)
DIFFERENTIAL METHOD BLD: ABNORMAL
EKG ATRIAL RATE: 60 BPM
EKG DIAGNOSIS: NORMAL
EKG P AXIS: 43 DEGREES
EKG P-R INTERVAL: 230 MS
EKG Q-T INTERVAL: 440 MS
EKG QRS DURATION: 102 MS
EKG QTC CALCULATION (BAZETT): 440 MS
EKG R AXIS: 51 DEGREES
EKG T AXIS: 80 DEGREES
EKG VENTRICULAR RATE: 60 BPM
EOSINOPHIL # BLD: 0.2 K/UL (ref 0–0.4)
EOSINOPHIL NFR BLD: 3 % (ref 0–5)
ERYTHROCYTE [DISTWIDTH] IN BLOOD BY AUTOMATED COUNT: 13.9 % (ref 11.6–14.5)
GLOBULIN SER CALC-MCNC: 2.6 G/DL (ref 2–4)
GLUCOSE SERPL-MCNC: 123 MG/DL (ref 74–99)
HCT VFR BLD AUTO: 25.8 % (ref 36–48)
HGB BLD-MCNC: 8.6 G/DL (ref 13–16)
IMM GRANULOCYTES # BLD AUTO: 0 K/UL (ref 0–0.04)
IMM GRANULOCYTES NFR BLD AUTO: 1 % (ref 0–0.5)
INR PPP: 1 (ref 0.8–1.2)
LYMPHOCYTES # BLD: 0.8 K/UL (ref 0.9–3.6)
LYMPHOCYTES NFR BLD: 13 % (ref 21–52)
MCH RBC QN AUTO: 27.7 PG (ref 24–34)
MCHC RBC AUTO-ENTMCNC: 33.3 G/DL (ref 31–37)
MCV RBC AUTO: 83.2 FL (ref 78–100)
MONOCYTES # BLD: 0.4 K/UL (ref 0.05–1.2)
MONOCYTES NFR BLD: 7 % (ref 3–10)
NEUTS SEG # BLD: 4.3 K/UL (ref 1.8–8)
NEUTS SEG NFR BLD: 76 % (ref 40–73)
NRBC # BLD: 0 K/UL (ref 0–0.01)
NRBC BLD-RTO: 0 PER 100 WBC
PLATELET # BLD AUTO: 289 K/UL (ref 135–420)
PMV BLD AUTO: 9.1 FL (ref 9.2–11.8)
POTASSIUM SERPL-SCNC: 4.8 MMOL/L (ref 3.5–5.5)
PROT SERPL-MCNC: 6 G/DL (ref 6.4–8.2)
PROTHROMBIN TIME: 13.7 SEC (ref 11.5–15.2)
RBC # BLD AUTO: 3.1 M/UL (ref 4.35–5.65)
SODIUM SERPL-SCNC: 133 MMOL/L (ref 136–145)
WBC # BLD AUTO: 5.7 K/UL (ref 4.6–13.2)

## 2023-05-09 PROCEDURE — 93005 ELECTROCARDIOGRAM TRACING: CPT

## 2023-05-09 PROCEDURE — 85025 COMPLETE CBC W/AUTO DIFF WBC: CPT

## 2023-05-09 PROCEDURE — 71046 X-RAY EXAM CHEST 2 VIEWS: CPT

## 2023-05-09 PROCEDURE — 85610 PROTHROMBIN TIME: CPT

## 2023-05-09 PROCEDURE — 80053 COMPREHEN METABOLIC PANEL: CPT

## 2023-05-09 PROCEDURE — 36415 COLL VENOUS BLD VENIPUNCTURE: CPT

## 2023-05-09 PROCEDURE — 93010 ELECTROCARDIOGRAM REPORT: CPT | Performed by: INTERNAL MEDICINE

## 2023-05-12 ENCOUNTER — ANESTHESIA EVENT (OUTPATIENT)
Dept: CARDIOTHORACIC SURGERY | Facility: HOSPITAL | Age: 58
End: 2023-05-12
Payer: COMMERCIAL

## 2023-05-15 ENCOUNTER — HOSPITAL ENCOUNTER (OUTPATIENT)
Facility: HOSPITAL | Age: 58
Setting detail: OUTPATIENT SURGERY
Discharge: HOME | End: 2023-05-15
Attending: SURGERY | Admitting: SURGERY
Payer: COMMERCIAL

## 2023-05-15 ENCOUNTER — ANESTHESIA (OUTPATIENT)
Dept: CARDIOTHORACIC SURGERY | Facility: HOSPITAL | Age: 58
End: 2023-05-15
Payer: COMMERCIAL

## 2023-05-15 VITALS
WEIGHT: 207.4 LBS | BODY MASS INDEX: 30.72 KG/M2 | OXYGEN SATURATION: 99 % | SYSTOLIC BLOOD PRESSURE: 145 MMHG | HEART RATE: 61 BPM | HEIGHT: 69 IN | RESPIRATION RATE: 12 BRPM | DIASTOLIC BLOOD PRESSURE: 86 MMHG | TEMPERATURE: 97.6 F

## 2023-05-15 DIAGNOSIS — N18.6 ESRD (END STAGE RENAL DISEASE) (HCC): Primary | ICD-10-CM

## 2023-05-15 PROCEDURE — 3700000001 HC ADD 15 MINUTES (ANESTHESIA): Performed by: SURGERY

## 2023-05-15 PROCEDURE — 2580000003 HC RX 258: Performed by: SURGERY

## 2023-05-15 PROCEDURE — 2709999900 HC NON-CHARGEABLE SUPPLY: Performed by: SURGERY

## 2023-05-15 PROCEDURE — 6360000002 HC RX W HCPCS: Performed by: SURGERY

## 2023-05-15 PROCEDURE — 6370000000 HC RX 637 (ALT 250 FOR IP)

## 2023-05-15 PROCEDURE — 2500000003 HC RX 250 WO HCPCS: Performed by: SURGERY

## 2023-05-15 PROCEDURE — 6360000002 HC RX W HCPCS: Performed by: NURSE ANESTHETIST, CERTIFIED REGISTERED

## 2023-05-15 PROCEDURE — 7100000011 HC PHASE II RECOVERY - ADDTL 15 MIN: Performed by: SURGERY

## 2023-05-15 PROCEDURE — 3600000012 HC SURGERY LEVEL 2 ADDTL 15MIN: Performed by: SURGERY

## 2023-05-15 PROCEDURE — 7100000010 HC PHASE II RECOVERY - FIRST 15 MIN: Performed by: SURGERY

## 2023-05-15 PROCEDURE — 2500000003 HC RX 250 WO HCPCS: Performed by: NURSE ANESTHETIST, CERTIFIED REGISTERED

## 2023-05-15 PROCEDURE — 3600000002 HC SURGERY LEVEL 2 BASE: Performed by: SURGERY

## 2023-05-15 PROCEDURE — 2580000003 HC RX 258: Performed by: NURSE ANESTHETIST, CERTIFIED REGISTERED

## 2023-05-15 PROCEDURE — 6370000000 HC RX 637 (ALT 250 FOR IP): Performed by: SURGERY

## 2023-05-15 PROCEDURE — 3700000000 HC ANESTHESIA ATTENDED CARE: Performed by: SURGERY

## 2023-05-15 PROCEDURE — 01844 ANES VASC SHUNT/SHUNT REVJ: CPT | Performed by: ANESTHESIOLOGY

## 2023-05-15 PROCEDURE — 7100000000 HC PACU RECOVERY - FIRST 15 MIN: Performed by: SURGERY

## 2023-05-15 PROCEDURE — 7100000001 HC PACU RECOVERY - ADDTL 15 MIN: Performed by: SURGERY

## 2023-05-15 RX ORDER — SODIUM CHLORIDE 0.9 % (FLUSH) 0.9 %
5-40 SYRINGE (ML) INJECTION EVERY 12 HOURS SCHEDULED
Status: DISCONTINUED | OUTPATIENT
Start: 2023-05-15 | End: 2023-05-15 | Stop reason: HOSPADM

## 2023-05-15 RX ORDER — HYDROCODONE BITARTRATE AND ACETAMINOPHEN 5; 325 MG/1; MG/1
1 TABLET ORAL EVERY 4 HOURS PRN
Qty: 30 TABLET | Refills: 0 | Status: SHIPPED | OUTPATIENT
Start: 2023-05-15 | End: 2023-05-20

## 2023-05-15 RX ORDER — HEPARIN SODIUM 200 [USP'U]/100ML
INJECTION, SOLUTION INTRAVENOUS CONTINUOUS PRN
Status: COMPLETED | OUTPATIENT
Start: 2023-05-15 | End: 2023-05-15

## 2023-05-15 RX ORDER — LIDOCAINE HYDROCHLORIDE 10 MG/ML
1 INJECTION, SOLUTION EPIDURAL; INFILTRATION; INTRACAUDAL; PERINEURAL
Status: DISCONTINUED | OUTPATIENT
Start: 2023-05-15 | End: 2023-05-15 | Stop reason: HOSPADM

## 2023-05-15 RX ORDER — DIPHENHYDRAMINE HYDROCHLORIDE 50 MG/ML
12.5 INJECTION INTRAMUSCULAR; INTRAVENOUS
Status: CANCELLED | OUTPATIENT
Start: 2023-05-15 | End: 2023-05-16

## 2023-05-15 RX ORDER — SODIUM CHLORIDE 9 MG/ML
INJECTION, SOLUTION INTRAVENOUS CONTINUOUS
Status: DISCONTINUED | OUTPATIENT
Start: 2023-05-15 | End: 2023-05-15 | Stop reason: HOSPADM

## 2023-05-15 RX ORDER — FENTANYL CITRATE 50 UG/ML
INJECTION, SOLUTION INTRAMUSCULAR; INTRAVENOUS PRN
Status: DISCONTINUED | OUTPATIENT
Start: 2023-05-15 | End: 2023-05-15 | Stop reason: SDUPTHER

## 2023-05-15 RX ORDER — HYDROCODONE BITARTRATE AND ACETAMINOPHEN 5; 325 MG/1; MG/1
1 TABLET ORAL ONCE
Status: COMPLETED | OUTPATIENT
Start: 2023-05-15 | End: 2023-05-15

## 2023-05-15 RX ORDER — SODIUM CHLORIDE 0.9 % (FLUSH) 0.9 %
5-40 SYRINGE (ML) INJECTION PRN
Status: CANCELLED | OUTPATIENT
Start: 2023-05-15

## 2023-05-15 RX ORDER — PROPOFOL 10 MG/ML
INJECTION, EMULSION INTRAVENOUS CONTINUOUS PRN
Status: DISCONTINUED | OUTPATIENT
Start: 2023-05-15 | End: 2023-05-15 | Stop reason: SDUPTHER

## 2023-05-15 RX ORDER — FAMOTIDINE 20 MG/1
20 TABLET, FILM COATED ORAL ONCE
Status: DISCONTINUED | OUTPATIENT
Start: 2023-05-15 | End: 2023-05-15 | Stop reason: HOSPADM

## 2023-05-15 RX ORDER — FAMOTIDINE 20 MG/1
TABLET, FILM COATED ORAL
Status: COMPLETED
Start: 2023-05-15 | End: 2023-05-15

## 2023-05-15 RX ORDER — FENTANYL CITRATE 50 UG/ML
50 INJECTION, SOLUTION INTRAMUSCULAR; INTRAVENOUS EVERY 5 MIN PRN
Status: CANCELLED | OUTPATIENT
Start: 2023-05-15

## 2023-05-15 RX ORDER — LIDOCAINE HYDROCHLORIDE 20 MG/ML
INJECTION, SOLUTION EPIDURAL; INFILTRATION; INTRACAUDAL; PERINEURAL PRN
Status: DISCONTINUED | OUTPATIENT
Start: 2023-05-15 | End: 2023-05-15 | Stop reason: SDUPTHER

## 2023-05-15 RX ORDER — SODIUM CHLORIDE 9 MG/ML
INJECTION, SOLUTION INTRAVENOUS PRN
Status: DISCONTINUED | OUTPATIENT
Start: 2023-05-15 | End: 2023-05-15 | Stop reason: HOSPADM

## 2023-05-15 RX ORDER — DEXMEDETOMIDINE HYDROCHLORIDE 100 UG/ML
INJECTION, SOLUTION INTRAVENOUS PRN
Status: DISCONTINUED | OUTPATIENT
Start: 2023-05-15 | End: 2023-05-15 | Stop reason: SDUPTHER

## 2023-05-15 RX ORDER — MIDAZOLAM HYDROCHLORIDE 1 MG/ML
INJECTION INTRAMUSCULAR; INTRAVENOUS PRN
Status: DISCONTINUED | OUTPATIENT
Start: 2023-05-15 | End: 2023-05-15 | Stop reason: SDUPTHER

## 2023-05-15 RX ORDER — SODIUM CHLORIDE 0.9 % (FLUSH) 0.9 %
5-40 SYRINGE (ML) INJECTION PRN
Status: DISCONTINUED | OUTPATIENT
Start: 2023-05-15 | End: 2023-05-15 | Stop reason: HOSPADM

## 2023-05-15 RX ORDER — CEFAZOLIN SODIUM 1 G/3ML
INJECTION, POWDER, FOR SOLUTION INTRAMUSCULAR; INTRAVENOUS PRN
Status: DISCONTINUED | OUTPATIENT
Start: 2023-05-15 | End: 2023-05-15 | Stop reason: SDUPTHER

## 2023-05-15 RX ORDER — HEPARIN SODIUM 200 [USP'U]/100ML
INJECTION, SOLUTION INTRAVENOUS
Status: DISCONTINUED
Start: 2023-05-15 | End: 2023-05-15 | Stop reason: HOSPADM

## 2023-05-15 RX ORDER — LIDOCAINE HYDROCHLORIDE 10 MG/ML
INJECTION, SOLUTION EPIDURAL; INFILTRATION; INTRACAUDAL; PERINEURAL PRN
Status: DISCONTINUED | OUTPATIENT
Start: 2023-05-15 | End: 2023-05-15 | Stop reason: ALTCHOICE

## 2023-05-15 RX ORDER — ONDANSETRON 2 MG/ML
4 INJECTION INTRAMUSCULAR; INTRAVENOUS
Status: CANCELLED | OUTPATIENT
Start: 2023-05-15 | End: 2023-05-16

## 2023-05-15 RX ORDER — HEPARIN SODIUM 1000 [USP'U]/ML
INJECTION, SOLUTION INTRAVENOUS; SUBCUTANEOUS PRN
Status: DISCONTINUED | OUTPATIENT
Start: 2023-05-15 | End: 2023-05-15 | Stop reason: SDUPTHER

## 2023-05-15 RX ORDER — WATER 1000 ML/1000ML
INJECTION, SOLUTION INTRAVENOUS PRN
Status: DISCONTINUED | OUTPATIENT
Start: 2023-05-15 | End: 2023-05-15 | Stop reason: SDUPTHER

## 2023-05-15 RX ADMIN — PROPOFOL 50 MCG/KG/MIN: 10 INJECTION, EMULSION INTRAVENOUS at 09:04

## 2023-05-15 RX ADMIN — SODIUM CHLORIDE: 9 INJECTION, SOLUTION INTRAVENOUS at 07:28

## 2023-05-15 RX ADMIN — FAMOTIDINE 20 MG: 20 TABLET ORAL at 07:27

## 2023-05-15 RX ADMIN — FENTANYL CITRATE 25 MCG: 50 INJECTION, SOLUTION INTRAMUSCULAR; INTRAVENOUS at 09:11

## 2023-05-15 RX ADMIN — HEPARIN SODIUM 3000 UNITS: 1000 INJECTION, SOLUTION INTRAVENOUS; SUBCUTANEOUS at 09:31

## 2023-05-15 RX ADMIN — PROPOFOL 40 MG: 10 INJECTION, EMULSION INTRAVENOUS at 09:19

## 2023-05-15 RX ADMIN — MIDAZOLAM HYDROCHLORIDE 2 MG: 2 INJECTION, SOLUTION INTRAMUSCULAR; INTRAVENOUS at 08:59

## 2023-05-15 RX ADMIN — DEXMEDETOMIDINE HYDROCHLORIDE 4 MCG: 100 INJECTION, SOLUTION INTRAVENOUS at 09:16

## 2023-05-15 RX ADMIN — FAMOTIDINE 20 MG: 20 TABLET, FILM COATED ORAL at 07:27

## 2023-05-15 RX ADMIN — FENTANYL CITRATE 25 MCG: 50 INJECTION, SOLUTION INTRAMUSCULAR; INTRAVENOUS at 09:24

## 2023-05-15 RX ADMIN — LIDOCAINE HYDROCHLORIDE 60 MG: 20 INJECTION, SOLUTION EPIDURAL; INFILTRATION; INTRACAUDAL; PERINEURAL at 09:03

## 2023-05-15 RX ADMIN — FENTANYL CITRATE 25 MCG: 50 INJECTION, SOLUTION INTRAMUSCULAR; INTRAVENOUS at 09:41

## 2023-05-15 RX ADMIN — FENTANYL CITRATE 25 MCG: 50 INJECTION, SOLUTION INTRAMUSCULAR; INTRAVENOUS at 09:00

## 2023-05-15 RX ADMIN — DEXMEDETOMIDINE HYDROCHLORIDE 6 MCG: 100 INJECTION, SOLUTION INTRAVENOUS at 09:06

## 2023-05-15 RX ADMIN — CEFAZOLIN 2 G: 330 INJECTION, POWDER, FOR SOLUTION INTRAMUSCULAR; INTRAVENOUS at 09:04

## 2023-05-15 RX ADMIN — HYDROCODONE BITARTRATE AND ACETAMINOPHEN 1 TABLET: 5; 325 TABLET ORAL at 12:26

## 2023-05-15 RX ADMIN — WATER 20 ML: 1 INJECTION INTRAMUSCULAR; INTRAVENOUS; SUBCUTANEOUS at 09:04

## 2023-05-15 ASSESSMENT — PAIN - FUNCTIONAL ASSESSMENT: PAIN_FUNCTIONAL_ASSESSMENT: 0-10

## 2023-05-15 ASSESSMENT — PAIN DESCRIPTION - LOCATION: LOCATION: ARM

## 2023-05-15 ASSESSMENT — PAIN SCALES - GENERAL
PAINLEVEL_OUTOF10: 4
PAINLEVEL_OUTOF10: 5

## 2023-05-15 ASSESSMENT — PAIN DESCRIPTION - DESCRIPTORS: DESCRIPTORS: OTHER (COMMENT)

## 2023-05-15 NOTE — ANESTHESIA PRE PROCEDURE
Department of Anesthesiology  Preprocedure Note       Name:  Jose Murrieta   Age:  65 y.o.  :  1965                                          MRN:  [id number]         Date:  5/15/2023      Surgeon: Hayder Morley):  Ivory Mcgee MD    Procedure: Procedure(s):  LEFT ARM ARTERIOVENOUS FISTULA CREATION POSSIBLE GRAFT    Medications prior to admission:   Prior to Admission medications    Medication Sig Start Date End Date Taking?  Authorizing Provider   amLODIPine (NORVASC) 5 MG tablet Take 1 tablet by mouth daily 23   Historical Provider, MD   furosemide (LASIX) 40 MG tablet Take 1 tablet by mouth 2 times daily 23   Historical Provider, MD   Multiple Vitamin (MULTI VITAMIN DAILY PO) Take by mouth    Historical Provider, MD   Ascorbic Acid (VITAMIN C) 250 MG tablet Take 1 tablet by mouth daily    Historical Provider, MD   ferrous sulfate (FE TABS 325) 325 (65 Fe) MG EC tablet Take 1 tablet by mouth daily  Patient not taking: Reported on 2023    Historical Provider, MD   atorvastatin (LIPITOR) 40 MG tablet take 1 tablet by mouth once daily 22   Ar Automatic Reconciliation   calcitRIOL (ROCALTROL) 0.25 MCG capsule Take 1 capsule by mouth daily 22   Ar Automatic Reconciliation   carvedilol (COREG) 12.5 MG tablet Take 1 tablet by mouth 2 times daily ceived the following from [facility]: Outside name: carvediloL (COREG) 6.25 mg tablet 22   Ar Automatic Reconciliation   Cholecalciferol 50 MCG ( UT) CAPS Take by mouth    Ar Automatic Reconciliation   ketoconazole (NIZORAL) 2 % shampoo Apply 5 mLs topically as needed 21   Ar Automatic Reconciliation   lamoTRIgine (LAMICTAL) 100 MG tablet Take 1 tablet by mouth 2 times daily    Ar Automatic Reconciliation   losartan (COZAAR) 100 MG tablet Take 1 tablet by mouth daily 22   Ar Automatic Reconciliation   OLANZapine (ZYPREXA) 5 MG tablet Take 1 tablet by mouth 2 times daily    Ar Automatic Reconciliation

## 2023-05-15 NOTE — DISCHARGE INSTRUCTIONS
flows back into your body through the other needle. Sometimes a doctor makes a short-term access through a tube, called a catheter, placed in your neck, upper chest, or groin. Follow-up care is a key part of your treatment and safety. Be sure to make and go to all appointments, and call your doctor if you are having problems. It's also a good idea to know your test results and keep a list of the medicines you take. How can you care for yourself at home? After your doctor creates an access, keep it dry for at least 2 days. Squeeze a soft ball or other object as instructed after the access is placed. This will help blood flow through the access and help prevent blood clots. After you have dialysis, check to see if the access bleeds or swells. Let your doctor know if your arm bleeds or swells. Do not lift anything heavy with the arm that has the access. Do not bump your arm. Don't wear tight clothing or jewelry over the access. Don't sleep with your access arm under your body. Have blood drawn or blood pressure taken from your other arm. Keep the access clean and dry. Don't put cream or lotion on or near the access. When should you call for help? Call your doctor now or seek immediate medical care if:    You have signs of infection, such as: Increased pain, swelling, warmth, or redness around the access. Red streaks leading from the access. Pus draining from the access. A fever. You do not feel a pulse in your access. Watch closely for changes in your health, and be sure to contact your doctor if:    You do not get better as expected. Where can you learn more? Go to http://www.woods.com/ and enter L169 to learn more about \"Hemodialysis Vascular Access: Care Instructions. \"  Current as of: May 4, 2022               Content Version: 13.6  © 2232-6022 [employer]. Care instructions adapted under license by [facility] ([facility]).  If you have questions about a medical

## 2023-05-15 NOTE — OP NOTE
Operative Note      Patient: Zeina Osullivan  YOB: 1965  MRN: [id number]    Date of Procedure: 5/15/2023    Pre-Op Diagnosis Codes:     * [facility] [N18.6]    Post-Op Diagnosis: Same       Procedure(s):  LEFT ARM ARTERIOVENOUS FISTULA CREATION  Creation left arm brachiocephalic fistula    Surgeon(s):  Zoie Carlos MD    Assistant:   Surgical Assistant: Darrius Woodson    Anesthesia: Monitor Anesthesia Care    Estimated Blood Loss (mL): Minimal    Complications: None    Specimens:   * No specimens in log *    Implants:  * No implants in log *      Drains: * No LDAs found *    Findings: Creation left arm brachiocephalic fistula      Detailed Description of Procedure:   Patient had preop antibiotic and moderate sedation supine position. Left arm was prepped draped usual standard fashion. Using ultrasound to confirm the cephalic vein which appeared as seen on ultrasound. I localized and made an incision at the antecubital fossa and expose the brachial artery it is soft and patent smaller in size. Exposed the cephalic vein and dissected out proximally and distally. I clipped it distally and transected allowing for transposition to the arterial site. Gave heparinization. After the appropriate waiting. Again 2 vessel loop controls on the artery made arteriotomy and cut the vein in a spatulated fashion and sewed it end-to-side to the artery using 7-0 Prolene suture circular in standard fashion. Upon completion of this anastomosis I released all the controls there was a very nice thrill throughout the fistula superficial and easy to feel. There is no tension on the closed the fascia with interrupted 3-0 Monocryl's. 4 Monocryl and Dermabond glue for the skin he was transferred to recovery in stable condition with no pain in his hand.     Electronically signed by Zoie Carlos MD on 5/15/2023 at 10:26 AM

## 2023-05-15 NOTE — ANESTHESIA POSTPROCEDURE EVALUATION
Department of Anesthesiology  Postprocedure Note    Patient: Danny Story  MRN: [id number]  YOB: 1965  Date of evaluation: 5/15/2023      Procedure Summary     Date: 05/15/23 Room / Location: [facility] CVT 02 / [facility] CARDIAC SURGERY    Anesthesia Start: 5109 Anesthesia Stop: 1022    Procedure: LEFT ARM ARTERIOVENOUS FISTULA CREATION (Left: Arm Lower) Diagnosis:       ESRD (end stage renal disease) ([facility]      (ESRD (end stage renal disease) ([facility] [N18.6])    Surgeons: Francia Morales MD Responsible Provider: Brittany Clemente DO    Anesthesia Type: MAC ASA Status: 3          Anesthesia Type: MAC    Purnima Phase I: Purnima Score: 9    Purnima Phase II: Purnima Score: 10      Anesthesia Post Evaluation    Patient location during evaluation: PACU  Patient participation: complete - patient participated  Level of consciousness: awake and alert  Airway patency: patent  Nausea & Vomiting: no nausea and no vomiting  Complications: no  Cardiovascular status: hemodynamically stable  Respiratory status: acceptable  Hydration status: stable  Multimodal analgesia pain management approach

## 2023-05-15 NOTE — H&P
for that written in the History of Present Illness. Objective:     Patient Vitals for the past 8 hrs:   BP Temp Temp src Pulse Resp SpO2 Height Weight   05/15/23 0723 133/79 98 °F (36.7 °C) Oral 59 20 96 % 5' 9\" (1.753 m) 207 lb 6.4 oz (94.1 kg)       Temp (24hrs), Av °F (36.7 °C), Min:98 °F (36.7 °C), Max:98 °F (36.7 °C)      Physical Exam:  Alert oriented affect is pleasant  Head is normocephalic  Neck no JVD  Chest clear  Cardiac regular  Abdomen soft nontender  Extremities range of motion and strength are equal    Labs: No results found for this or any previous visit (from the past 24 hour(s)). Data Review: Reviewed    Assessment:     Active Problems:    * No active hospital problems. *  Resolved Problems:    * No resolved hospital problems.  *      Plan:     Left arm AV fistula possible graft    Signed By: Fina Henriquez MD     May 15, 2023

## 2023-08-02 ENCOUNTER — HOSPITAL ENCOUNTER (OUTPATIENT)
Facility: HOSPITAL | Age: 58
Discharge: HOME | End: 2023-08-05
Payer: COMMERCIAL

## 2023-08-02 DIAGNOSIS — Z01.811 PRE-OP CHEST EXAM: ICD-10-CM

## 2023-08-02 DIAGNOSIS — N18.6 END STAGE RENAL DISEASE (HCC): ICD-10-CM

## 2023-08-02 LAB
ANION GAP SERPL CALC-SCNC: 11 MMOL/L (ref 3–18)
BASOPHILS # BLD: 0 K/UL (ref 0–0.1)
BASOPHILS NFR BLD: 1 % (ref 0–2)
BUN SERPL-MCNC: 91 MG/DL (ref 7–18)
BUN/CREAT SERPL: 11 (ref 12–20)
CALCIUM SERPL-MCNC: 9.4 MG/DL (ref 8.5–10.1)
CHLORIDE SERPL-SCNC: 95 MMOL/L (ref 100–111)
CO2 SERPL-SCNC: 24 MMOL/L (ref 21–32)
CREAT SERPL-MCNC: 8.07 MG/DL (ref 0.6–1.3)
DIFFERENTIAL METHOD BLD: ABNORMAL
EOSINOPHIL # BLD: 0.1 K/UL (ref 0–0.4)
EOSINOPHIL NFR BLD: 3 % (ref 0–5)
ERYTHROCYTE [DISTWIDTH] IN BLOOD BY AUTOMATED COUNT: 13.3 % (ref 11.6–14.5)
GLUCOSE SERPL-MCNC: 110 MG/DL (ref 74–99)
HCT VFR BLD AUTO: 24.8 % (ref 36–48)
HGB BLD-MCNC: 8 G/DL (ref 13–16)
IMM GRANULOCYTES # BLD AUTO: 0 K/UL (ref 0–0.04)
IMM GRANULOCYTES NFR BLD AUTO: 0 % (ref 0–0.5)
LYMPHOCYTES # BLD: 0.5 K/UL (ref 0.9–3.6)
LYMPHOCYTES NFR BLD: 10 % (ref 21–52)
MCH RBC QN AUTO: 26.9 PG (ref 24–34)
MCHC RBC AUTO-ENTMCNC: 32.3 G/DL (ref 31–37)
MCV RBC AUTO: 83.5 FL (ref 78–100)
MONOCYTES # BLD: 0.6 K/UL (ref 0.05–1.2)
MONOCYTES NFR BLD: 11 % (ref 3–10)
NEUTS SEG # BLD: 3.8 K/UL (ref 1.8–8)
NEUTS SEG NFR BLD: 75 % (ref 40–73)
NRBC # BLD: 0 K/UL (ref 0–0.01)
NRBC BLD-RTO: 0 PER 100 WBC
PLATELET # BLD AUTO: 296 K/UL (ref 135–420)
PMV BLD AUTO: 9.6 FL (ref 9.2–11.8)
POTASSIUM SERPL-SCNC: 4.4 MMOL/L (ref 3.5–5.5)
RBC # BLD AUTO: 2.97 M/UL (ref 4.35–5.65)
SODIUM SERPL-SCNC: 130 MMOL/L (ref 136–145)
WBC # BLD AUTO: 5.1 K/UL (ref 4.6–13.2)

## 2023-08-02 PROCEDURE — 85025 COMPLETE CBC W/AUTO DIFF WBC: CPT

## 2023-08-02 PROCEDURE — 93005 ELECTROCARDIOGRAM TRACING: CPT

## 2023-08-02 PROCEDURE — 71046 X-RAY EXAM CHEST 2 VIEWS: CPT

## 2023-08-02 PROCEDURE — 80048 BASIC METABOLIC PNL TOTAL CA: CPT

## 2023-08-02 PROCEDURE — 36415 COLL VENOUS BLD VENIPUNCTURE: CPT

## 2023-08-03 LAB
EKG ATRIAL RATE: 65 BPM
EKG DIAGNOSIS: NORMAL
EKG P AXIS: 50 DEGREES
EKG P-R INTERVAL: 234 MS
EKG Q-T INTERVAL: 460 MS
EKG QRS DURATION: 110 MS
EKG QTC CALCULATION (BAZETT): 478 MS
EKG R AXIS: 20 DEGREES
EKG T AXIS: 18 DEGREES
EKG VENTRICULAR RATE: 65 BPM

## 2023-08-03 PROCEDURE — 93010 ELECTROCARDIOGRAM REPORT: CPT | Performed by: INTERNAL MEDICINE

## 2023-08-09 ENCOUNTER — HOSPITAL ENCOUNTER (OUTPATIENT)
Facility: HOSPITAL | Age: 58
Discharge: HOME | End: 2023-08-12
Payer: COMMERCIAL

## 2023-08-09 LAB
ALBUMIN SERPL-MCNC: 3.4 G/DL (ref 3.4–5)
ANION GAP SERPL CALC-SCNC: 13 MMOL/L (ref 3–18)
BUN SERPL-MCNC: 87 MG/DL (ref 7–18)
BUN/CREAT SERPL: 10 (ref 12–20)
CALCIUM SERPL-MCNC: 9.1 MG/DL (ref 8.5–10.1)
CALCIUM SERPL-MCNC: 9.4 MG/DL (ref 8.5–10.1)
CHLORIDE SERPL-SCNC: 93 MMOL/L (ref 100–111)
CO2 SERPL-SCNC: 21 MMOL/L (ref 21–32)
CREAT SERPL-MCNC: 9.04 MG/DL (ref 0.6–1.3)
CREAT UR-MCNC: 59 MG/DL (ref 30–125)
ERYTHROCYTE [DISTWIDTH] IN BLOOD BY AUTOMATED COUNT: 13.5 % (ref 11.6–14.5)
GLUCOSE SERPL-MCNC: 109 MG/DL (ref 74–99)
HCT VFR BLD AUTO: 25.1 % (ref 36–48)
HGB BLD-MCNC: 8.1 G/DL (ref 13–16)
MCH RBC QN AUTO: 27.4 PG (ref 24–34)
MCHC RBC AUTO-ENTMCNC: 32.3 G/DL (ref 31–37)
MCV RBC AUTO: 84.8 FL (ref 78–100)
NRBC # BLD: 0 K/UL (ref 0–0.01)
NRBC BLD-RTO: 0 PER 100 WBC
PHOSPHATE SERPL-MCNC: 6.8 MG/DL (ref 2.5–4.9)
PLATELET # BLD AUTO: 303 K/UL (ref 135–420)
PMV BLD AUTO: 9.5 FL (ref 9.2–11.8)
POTASSIUM SERPL-SCNC: 4.8 MMOL/L (ref 3.5–5.5)
PROT UR-MCNC: 259 MG/DL
PTH-INTACT SERPL-MCNC: 276.2 PG/ML (ref 18.4–88)
RBC # BLD AUTO: 2.96 M/UL (ref 4.35–5.65)
SODIUM SERPL-SCNC: 127 MMOL/L (ref 136–145)
WBC # BLD AUTO: 7 K/UL (ref 4.6–13.2)

## 2023-08-09 PROCEDURE — 86706 HEP B SURFACE ANTIBODY: CPT

## 2023-08-09 PROCEDURE — 85027 COMPLETE CBC AUTOMATED: CPT

## 2023-08-09 PROCEDURE — 83970 ASSAY OF PARATHORMONE: CPT

## 2023-08-09 PROCEDURE — 84156 ASSAY OF PROTEIN URINE: CPT

## 2023-08-09 PROCEDURE — 36415 COLL VENOUS BLD VENIPUNCTURE: CPT

## 2023-08-09 PROCEDURE — 80069 RENAL FUNCTION PANEL: CPT

## 2023-08-09 PROCEDURE — 86704 HEP B CORE ANTIBODY TOTAL: CPT

## 2023-08-09 PROCEDURE — 82570 ASSAY OF URINE CREATININE: CPT

## 2023-08-09 PROCEDURE — 87340 HEPATITIS B SURFACE AG IA: CPT

## 2023-08-09 PROCEDURE — 86803 HEPATITIS C AB TEST: CPT

## 2023-08-10 LAB
HBV SURFACE AB SER QL IA: NEGATIVE
HBV SURFACE AB SERPL IA-ACNC: <3.1 MIU/ML
HBV SURFACE AG SER QL: <0.1 INDEX
HBV SURFACE AG SER QL: NEGATIVE
HCV AB SER IA-ACNC: 0.02 INDEX
HCV AB SERPL QL IA: NEGATIVE
HEPATITIS C COMMENT: NORMAL
Lab: ABNORMAL

## 2023-08-11 LAB — HBV CORE AB SERPL QL IA: NEGATIVE

## 2023-09-11 ENCOUNTER — OFFICE VISIT (OUTPATIENT)
Age: 58
End: 2023-09-11
Payer: COMMERCIAL

## 2023-09-11 VITALS
DIASTOLIC BLOOD PRESSURE: 82 MMHG | SYSTOLIC BLOOD PRESSURE: 136 MMHG | TEMPERATURE: 98.7 F | BODY MASS INDEX: 29.62 KG/M2 | HEIGHT: 69 IN | HEART RATE: 78 BPM | WEIGHT: 200 LBS | RESPIRATION RATE: 16 BRPM

## 2023-09-11 DIAGNOSIS — R10.9 ABDOMINAL PAIN, UNSPECIFIED ABDOMINAL LOCATION: ICD-10-CM

## 2023-09-11 DIAGNOSIS — R10.13 DYSPEPSIA: ICD-10-CM

## 2023-09-11 DIAGNOSIS — R11.0 NAUSEA: Primary | ICD-10-CM

## 2023-09-11 DIAGNOSIS — N18.6 ESRD (END STAGE RENAL DISEASE) (HCC): ICD-10-CM

## 2023-09-11 PROCEDURE — 3075F SYST BP GE 130 - 139MM HG: CPT | Performed by: INTERNAL MEDICINE

## 2023-09-11 PROCEDURE — 3079F DIAST BP 80-89 MM HG: CPT | Performed by: INTERNAL MEDICINE

## 2023-09-11 PROCEDURE — 99214 OFFICE O/P EST MOD 30 MIN: CPT | Performed by: INTERNAL MEDICINE

## 2023-09-11 RX ORDER — OMEPRAZOLE 20 MG/1
20 CAPSULE, DELAYED RELEASE ORAL
Qty: 90 CAPSULE | Refills: 1 | Status: SHIPPED | OUTPATIENT
Start: 2023-09-11

## 2023-09-11 RX ORDER — LANOLIN ALCOHOL/MO/W.PET/CERES
1000 CREAM (GRAM) TOPICAL DAILY
COMMUNITY

## 2023-09-11 SDOH — ECONOMIC STABILITY: FOOD INSECURITY: WITHIN THE PAST 12 MONTHS, THE FOOD YOU BOUGHT JUST DIDN'T LAST AND YOU DIDN'T HAVE MONEY TO GET MORE.: NEVER TRUE

## 2023-09-11 SDOH — ECONOMIC STABILITY: FOOD INSECURITY: WITHIN THE PAST 12 MONTHS, YOU WORRIED THAT YOUR FOOD WOULD RUN OUT BEFORE YOU GOT MONEY TO BUY MORE.: NEVER TRUE

## 2023-09-11 SDOH — ECONOMIC STABILITY: INCOME INSECURITY: HOW HARD IS IT FOR YOU TO PAY FOR THE VERY BASICS LIKE FOOD, HOUSING, MEDICAL CARE, AND HEATING?: NOT HARD AT ALL

## 2023-09-11 ASSESSMENT — PATIENT HEALTH QUESTIONNAIRE - PHQ9
9. THOUGHTS THAT YOU WOULD BE BETTER OFF DEAD, OR OF HURTING YOURSELF: 0
3. TROUBLE FALLING OR STAYING ASLEEP: 0
1. LITTLE INTEREST OR PLEASURE IN DOING THINGS: 0
4. FEELING TIRED OR HAVING LITTLE ENERGY: 0
SUM OF ALL RESPONSES TO PHQ QUESTIONS 1-9: 0
SUM OF ALL RESPONSES TO PHQ9 QUESTIONS 1 & 2: 0
6. FEELING BAD ABOUT YOURSELF - OR THAT YOU ARE A FAILURE OR HAVE LET YOURSELF OR YOUR FAMILY DOWN: 0
7. TROUBLE CONCENTRATING ON THINGS, SUCH AS READING THE NEWSPAPER OR WATCHING TELEVISION: 0
8. MOVING OR SPEAKING SO SLOWLY THAT OTHER PEOPLE COULD HAVE NOTICED. OR THE OPPOSITE, BEING SO FIGETY OR RESTLESS THAT YOU HAVE BEEN MOVING AROUND A LOT MORE THAN USUAL: 0
10. IF YOU CHECKED OFF ANY PROBLEMS, HOW DIFFICULT HAVE THESE PROBLEMS MADE IT FOR YOU TO DO YOUR WORK, TAKE CARE OF THINGS AT HOME, OR GET ALONG WITH OTHER PEOPLE: 0
2. FEELING DOWN, DEPRESSED OR HOPELESS: 0
SUM OF ALL RESPONSES TO PHQ QUESTIONS 1-9: 0
5. POOR APPETITE OR OVEREATING: 0

## 2023-09-11 NOTE — PROGRESS NOTES
Chief Complaint   Patient presents with    Other     Abdominal pain         Maria Dolores Teran presents today for   Chief Complaint   Patient presents with    Other     Abdominal pain     Upset stomach and does not feel like eating. Has decreased food intake r/t anorexia. Has lost 11 lbs. 1. \"Have you been to the ER, urgent care clinic since your last visit? Hospitalized since your last visit? \" no    2. \"Have you seen or consulted any other health care providers outside of the [address] [place] [address] since your last visit? \" Yes nephrology    3. For patients aged 43-73: Has the patient had a colonoscopy / FIT/ Cologuard? Yes - no Care Gap present      If the patient is female:    4. For patients aged 43-66: Has the patient had a mammogram within the past 2 years? NA - based on age or sex      11. For patients aged 21-65: Has the patient had a pap smear?  NA - based on age or sex

## 2023-09-11 NOTE — PROGRESS NOTES
63 y.o. male who presents for evaluation. Since we saw him last in April, he has started HD on TTS schedule per Dr Carmen Vang. Doing ok except for gi upset. He;s noted that he has mostly epigastric discomfort although not actual pain. Sometimes there's a heartburn component. He feels queasy and 'sick' intermittently although no actual vomiting. Bms are stable every 1-3d which is his normal pattern for years. No blood or dark stools. He's had chronic anemia, not on oral fe, getting iron infusion during HD. Weight is down but unsure how much of that is from the HD.      Past Medical History:   Diagnosis Date    Anemia     Bipolar disorder ([address] )     Dr. Mary Medrano since 1984    CKD (chronic kidney disease) 2007    Dr. Pennie Reece; Dr Carmen Vang 2021 bx FSGS    Colon adenoma     and diverticulosis 7/15 Dr Ricardo Oseguera    COVID-19 virus infection 09/2021    Degenerative arthritis of spine 01/2016    on t and l spine films SARTHAK    Dyslipidemia     calculated 10 yr risk score was 5.9% (4/14); 5.4% (11/14); 4.3% (5/15); 6.7% (5/16); 7.3% (11/16)    ED (erectile dysfunction) 08/10/2017    ESRD on hemodialysis ([address]) 08/2023    Dr Carmen Vang    H/O cardiovascular stress test     ETT neg 2004; ETT neg 2009    Hypertension     IFG (impaired fasting glucose) 11/23/2015    Lumbar radiculitis 2020    on emg Dr Yvette Tay    Myofascial pain 2016    Dr Jaylen Yadav    Obesity     IF 11/18 start weight 222 lbs    LALITO on CPAP     Dr Newman-currently not using per pt    Proteinuria 2011    Subacromial bursitis     right Dr. Daisy Santiago     Past Surgical History:   Procedure Laterality Date    COLONOSCOPY      Dr Ricardo Oseguera (7/15) adenoma and divertics; Dr Cameron Chadwick (2/25/21) divertics and adenomas    CT BIOPSY RENAL  10/25/2021    CT BIOPSY RENAL 10/25/2021 [facility] RAD CT    DIALYSIS FISTULA CREATION Left 5/15/2023    LEFT ARM ARTERIOVENOUS FISTULA CREATION performed by Leonid Callaway MD at [address] [place] [address]     Current Outpatient Medications   Medication Sig    vitamin B-12

## 2023-09-16 LAB
ALBUMIN SERPL-MCNC: 4 G/DL (ref 3.8–4.9)
ALP SERPL-CCNC: 120 IU/L (ref 44–121)
ALT SERPL-CCNC: 12 IU/L (ref 0–44)
AST SERPL-CCNC: 17 IU/L (ref 0–40)
BILIRUB DIRECT SERPL-MCNC: 0.15 MG/DL (ref 0–0.4)
BILIRUB SERPL-MCNC: 0.4 MG/DL (ref 0–1.2)
LIPASE SERPL-CCNC: 47 U/L (ref 13–78)
PROT SERPL-MCNC: 6 G/DL (ref 6–8.5)
SPECIMEN STATUS REPORT: NORMAL

## 2023-09-17 ENCOUNTER — TELEPHONE (OUTPATIENT)
Age: 58
End: 2023-09-17

## 2023-09-18 NOTE — TELEPHONE ENCOUNTER
Patient is aware of results and states he only took the acid blocker once. Patient states his symptoms went away. Patient states his appetite came back.

## 2023-10-30 ENCOUNTER — OFFICE VISIT (OUTPATIENT)
Age: 58
End: 2023-10-30
Payer: COMMERCIAL

## 2023-10-30 VITALS
DIASTOLIC BLOOD PRESSURE: 71 MMHG | HEIGHT: 69 IN | TEMPERATURE: 98.3 F | SYSTOLIC BLOOD PRESSURE: 126 MMHG | HEART RATE: 60 BPM | RESPIRATION RATE: 16 BRPM | WEIGHT: 201 LBS | BODY MASS INDEX: 29.77 KG/M2 | OXYGEN SATURATION: 96 %

## 2023-10-30 DIAGNOSIS — J30.9 ALLERGIC RHINITIS, UNSPECIFIED SEASONALITY, UNSPECIFIED TRIGGER: ICD-10-CM

## 2023-10-30 DIAGNOSIS — H10.13 ALLERGIC CONJUNCTIVITIS OF BOTH EYES: Primary | ICD-10-CM

## 2023-10-30 PROCEDURE — 3074F SYST BP LT 130 MM HG: CPT | Performed by: INTERNAL MEDICINE

## 2023-10-30 PROCEDURE — 3078F DIAST BP <80 MM HG: CPT | Performed by: INTERNAL MEDICINE

## 2023-10-30 PROCEDURE — 99213 OFFICE O/P EST LOW 20 MIN: CPT | Performed by: INTERNAL MEDICINE

## 2023-10-30 RX ORDER — SEVELAMER CARBONATE 800 MG/1
1 TABLET, FILM COATED ORAL
COMMUNITY

## 2023-10-30 SDOH — ECONOMIC STABILITY: FOOD INSECURITY: WITHIN THE PAST 12 MONTHS, YOU WORRIED THAT YOUR FOOD WOULD RUN OUT BEFORE YOU GOT MONEY TO BUY MORE.: NEVER TRUE

## 2023-10-30 SDOH — ECONOMIC STABILITY: FOOD INSECURITY: WITHIN THE PAST 12 MONTHS, THE FOOD YOU BOUGHT JUST DIDN'T LAST AND YOU DIDN'T HAVE MONEY TO GET MORE.: NEVER TRUE

## 2023-10-30 SDOH — ECONOMIC STABILITY: INCOME INSECURITY: HOW HARD IS IT FOR YOU TO PAY FOR THE VERY BASICS LIKE FOOD, HOUSING, MEDICAL CARE, AND HEATING?: NOT HARD AT ALL

## 2023-10-30 ASSESSMENT — PATIENT HEALTH QUESTIONNAIRE - PHQ9
SUM OF ALL RESPONSES TO PHQ9 QUESTIONS 1 & 2: 0
SUM OF ALL RESPONSES TO PHQ QUESTIONS 1-9: 0
1. LITTLE INTEREST OR PLEASURE IN DOING THINGS: 0
2. FEELING DOWN, DEPRESSED OR HOPELESS: 0
SUM OF ALL RESPONSES TO PHQ QUESTIONS 1-9: 0

## 2023-10-30 NOTE — PROGRESS NOTES
Chief Complaint   Patient presents with    Follow-up         Edgar Monique presents today for   Chief Complaint   Patient presents with    Follow-up             1. \"Have you been to the ER, urgent care clinic since your last visit? Hospitalized since your last visit? \" no    2. \"Have you seen or consulted any other health care providers outside of the [address] [place] [address] since your last visit? \" no     3. For patients aged 43-73: Has the patient had a colonoscopy / FIT/ Cologuard? Yes - no Care Gap present      If the patient is female:    4. For patients aged 43-66: Has the patient had a mammogram within the past 2 years? NA - based on age or sex      11. For patients aged 21-65: Has the patient had a pap smear?  NA - based on age or sex

## 2023-10-30 NOTE — PROGRESS NOTES
63 y.o. male who presents for evaluation. He's developing allergies, no prior h/o it. Reports that the last few weeks, he's been having some sinus congestion with clear drainage, ear itching with clear drainage. No f, facial pain, sore throat, prod cough, wheezing, purulence. Mucinex has helped, he has flonase but not using as he used it mostly with the cpap which he hasn't been using the cpap much after he lost some weight.  The only new thing is that his sister moved in with him and she did bring in some plants, no pets    Past Medical History:   Diagnosis Date    Anemia     Bipolar disorder ([facility])     Dr. Stokes Carry since 1984    CKD (chronic kidney disease) 2007    Dr. Jewel Obrien; Dr Sridhar Rabago 2021 bx FSGS    Colon adenoma     and diverticulosis 7/15 Dr Juana Kya    COVID-19 virus infection 09/2021    Degenerative arthritis of spine 01/2016    on t and l spine films SARTHAK    Dyslipidemia     calculated 10 yr risk score was 5.9% (4/14); 5.4% (11/14); 4.3% (5/15); 6.7% (5/16); 7.3% (11/16)    ED (erectile dysfunction) 08/10/2017    ESRD on hemodialysis ([facility]) 08/2023    Dr Sridhar Rabago    H/O cardiovascular stress test     ETT neg 2004; ETT neg 2009    Hypertension     IFG (impaired fasting glucose) 11/23/2015    Lumbar radiculitis 2020    on emg Dr Javier Odell    Myofascial pain 2016    Dr Mildred Garza    Obesity     IF 11/18 start weight 222 lbs    LALITO on CPAP     Dr Newman-currently not using per pt    Proteinuria 2011    Subacromial bursitis     right Dr. Wilda Fischer     Current Outpatient Medications   Medication Sig    sevelamer (RENVELA) 800 MG tablet Take 1 tablet by mouth 3 times daily (with meals)    vitamin B-12 (CYANOCOBALAMIN) 1000 MCG tablet Take 1 tablet by mouth daily    amLODIPine (NORVASC) 5 MG tablet Take 1 tablet by mouth daily    Ascorbic Acid (VITAMIN C) 250 MG tablet Take 1 tablet by mouth daily    ferrous sulfate (FE TABS 325) 325 (65 Fe) MG EC tablet Take 1 tablet by mouth daily    atorvastatin (LIPITOR) 40 MG tablet take

## 2023-12-11 RX ORDER — ATORVASTATIN CALCIUM 40 MG/1
TABLET, FILM COATED ORAL
Qty: 90 TABLET | Refills: 2 | Status: SHIPPED | OUTPATIENT
Start: 2023-12-11

## 2024-01-19 ENCOUNTER — TELEPHONE (OUTPATIENT)
Age: 59
End: 2024-01-19

## 2024-01-22 ENCOUNTER — TELEMEDICINE (OUTPATIENT)
Age: 59
End: 2024-01-22
Payer: COMMERCIAL

## 2024-01-22 ENCOUNTER — TELEPHONE (OUTPATIENT)
Age: 59
End: 2024-01-22

## 2024-01-22 DIAGNOSIS — R34 DECREASED URINE OUTPUT: ICD-10-CM

## 2024-01-22 DIAGNOSIS — J40 BRONCHITIS: Primary | ICD-10-CM

## 2024-01-22 DIAGNOSIS — R20.2 PARESTHESIA OF BOTH FEET: ICD-10-CM

## 2024-01-22 DIAGNOSIS — R09.89 CHEST CONGESTION: ICD-10-CM

## 2024-01-22 PROCEDURE — 99214 OFFICE O/P EST MOD 30 MIN: CPT | Performed by: INTERNAL MEDICINE

## 2024-01-22 RX ORDER — ALBUTEROL SULFATE 90 UG/1
2 AEROSOL, METERED RESPIRATORY (INHALATION) 4 TIMES DAILY PRN
Qty: 18 G | Refills: 5 | Status: SHIPPED | OUTPATIENT
Start: 2024-01-22

## 2024-01-22 NOTE — TELEPHONE ENCOUNTER
Pt had a VV this morning , pt said he forgot to ask Dr  if he is still contagious and when can he go out in public please advise     [phone]

## 2024-01-22 NOTE — PROGRESS NOTES
Teodoro Nava 58 y.o. who was seen by synchronous (real-time) audio-video technology for   Chief Complaint   Patient presents with    Cough    Urinary Retention    Foot Pain             Assessment & Plan:      Diagnosis Orders   1. Bronchitis  albuterol sulfate HFA (VENTOLIN HFA) 108 (90 Base) MCG/ACT inhaler      2. Chest congestion  albuterol sulfate HFA (VENTOLIN HFA) 108 (90 Base) MCG/ACT inhaler      3. Decreased urine output        4. Paresthesia of both feet            712  Subjective:   He started having URI symptoms around 1/13/2024, mostly chest congestion and cough productive of mucousy clear material.  Finally ended up at urgent care on 1/19/2024, they said he had \"something abnormal in the lower lobes\" but could not specify which, they did not specifically use the term pneumonia.  They sent him home with doxycycline and Tessalon.  Over the next few days, he has felt little bit better but still a lot of chest congestion, no overt wheezing or shortness of breath.  He has been using OTC meds as well.    He has ESRD on hemodialysis and has noted decreasing urinary output.  No dysuria, urgency, frequency, flank pain, suprapubic pain.  I told him that to fully evaluate this, he will need to have evaluation with urology most likely    Lastly, has been having some tingling in his toes.  I told him that he will need to come in to have this evaluated.      Objective:     Past Medical History:   Diagnosis Date    Anemia     Bipolar disorder ([facility])     Dr. Arceo since 1984    CKD (chronic kidney disease) 2007    Dr. Sena; Dr Garcia 2021 bx FSGS    Colon adenoma     and diverticulosis 7/15 Dr Lopez    COVID-19 virus infection 09/2021    Degenerative arthritis of spine 01/2016    on t and l spine films SARTHAK    Dyslipidemia     calculated 10 yr risk score was 5.9% (4/14); 5.4% (11/14); 4.3% (5/15); 6.7% (5/16); 7.3% (11/16)    ED (erectile dysfunction) 08/10/2017    ESRD on hemodialysis ([facility]) 08/2023    Dr Garcia

## 2024-01-23 ENCOUNTER — TELEPHONE (OUTPATIENT)
Age: 59
End: 2024-01-23

## 2024-01-23 NOTE — TELEPHONE ENCOUNTER
----- Message from Zi Alcala MD sent at 1/22/2024 12:48 PM EST -----  Pls get note from pt first - specifically the official cxr reading - from 1/19/24

## 2024-01-23 NOTE — TELEPHONE ENCOUNTER
Pt called said he had a vv yeserday DR Alclaa was going to send an antibiotic to his pharmacy  ,please call pt when antibiotic is sent   [facility]

## 2024-01-23 NOTE — TELEPHONE ENCOUNTER
No.  See note    Assessment and plan:  1.  Bronchitis?  Will try to get x-ray report from urgent care.  I added albuterol, held off on adding second antibiotic for now.  Declined steroids  2.  Decreasing urine output possibly from ESRD on HD.  Cannot rule out urologic issue, he will talk to nephrology  3.  Paresthesias foot, r/o neuropathy.  He will come in for an appointment      If the cxr shows pna, may need the 2nd abx  Pls get xray report from urgent care

## 2024-01-25 ENCOUNTER — TELEPHONE (OUTPATIENT)
Age: 59
End: 2024-01-25

## 2024-01-25 DIAGNOSIS — J18.9 COMMUNITY ACQUIRED PNEUMONIA OF LEFT LOWER LOBE OF LUNG: Primary | ICD-10-CM

## 2024-01-25 RX ORDER — CEFPODOXIME PROXETIL 100 MG/1
100 TABLET, FILM COATED ORAL 2 TIMES DAILY
Qty: 20 TABLET | Refills: 0 | Status: SHIPPED | OUTPATIENT
Start: 2024-01-25 | End: 2024-02-04

## 2024-02-19 ENCOUNTER — APPOINTMENT (OUTPATIENT)
Facility: HOSPITAL | Age: 59
End: 2024-02-19
Payer: COMMERCIAL

## 2024-02-19 ENCOUNTER — HOSPITAL ENCOUNTER (EMERGENCY)
Facility: HOSPITAL | Age: 59
Discharge: HOME | End: 2024-02-19
Attending: EMERGENCY MEDICINE
Payer: COMMERCIAL

## 2024-02-19 VITALS
TEMPERATURE: 98.3 F | OXYGEN SATURATION: 96 % | HEART RATE: 68 BPM | HEIGHT: 69 IN | WEIGHT: 192 LBS | SYSTOLIC BLOOD PRESSURE: 147 MMHG | RESPIRATION RATE: 9 BRPM | BODY MASS INDEX: 28.44 KG/M2 | DIASTOLIC BLOOD PRESSURE: 81 MMHG

## 2024-02-19 DIAGNOSIS — R10.9 FLANK PAIN: ICD-10-CM

## 2024-02-19 DIAGNOSIS — R11.0 NAUSEA: ICD-10-CM

## 2024-02-19 DIAGNOSIS — E87.1 CHRONIC HYPONATREMIA: ICD-10-CM

## 2024-02-19 DIAGNOSIS — R53.1 GENERAL WEAKNESS: Primary | ICD-10-CM

## 2024-02-19 LAB
ALBUMIN SERPL-MCNC: 3.6 G/DL (ref 3.4–5)
ALBUMIN/GLOB SERPL: 1.2 (ref 0.8–1.7)
ALP SERPL-CCNC: 127 U/L (ref 45–117)
ALT SERPL-CCNC: 23 U/L (ref 16–61)
ANION GAP SERPL CALC-SCNC: 6 MMOL/L (ref 3–18)
APPEARANCE UR: CLEAR
AST SERPL-CCNC: 15 U/L (ref 10–38)
BASOPHILS # BLD: 0 K/UL (ref 0–0.1)
BASOPHILS NFR BLD: 0 % (ref 0–2)
BILIRUB SERPL-MCNC: 0.5 MG/DL (ref 0.2–1)
BILIRUB UR QL: NEGATIVE
BUN SERPL-MCNC: 52 MG/DL (ref 7–18)
BUN/CREAT SERPL: 6 (ref 12–20)
CALCIUM SERPL-MCNC: 8.9 MG/DL (ref 8.5–10.1)
CHLORIDE SERPL-SCNC: 95 MMOL/L (ref 100–111)
CO2 SERPL-SCNC: 27 MMOL/L (ref 21–32)
COLOR UR: YELLOW
CREAT SERPL-MCNC: 8.09 MG/DL (ref 0.6–1.3)
DIFFERENTIAL METHOD BLD: ABNORMAL
EOSINOPHIL # BLD: 0.2 K/UL (ref 0–0.4)
EOSINOPHIL NFR BLD: 2 % (ref 0–5)
ERYTHROCYTE [DISTWIDTH] IN BLOOD BY AUTOMATED COUNT: 15.9 % (ref 11.6–14.5)
FLUAV AG NPH QL IA: NEGATIVE
FLUBV AG NOSE QL IA: NEGATIVE
GLOBULIN SER CALC-MCNC: 3 G/DL (ref 2–4)
GLUCOSE SERPL-MCNC: 130 MG/DL (ref 74–99)
GLUCOSE UR STRIP.AUTO-MCNC: NEGATIVE MG/DL
HCT VFR BLD AUTO: 27.9 % (ref 36–48)
HGB BLD-MCNC: 9.3 G/DL (ref 13–16)
HGB UR QL STRIP: ABNORMAL
IMM GRANULOCYTES # BLD AUTO: 0 K/UL (ref 0–0.04)
IMM GRANULOCYTES NFR BLD AUTO: 0 % (ref 0–0.5)
KETONES UR QL STRIP.AUTO: NEGATIVE MG/DL
LEUKOCYTE ESTERASE UR QL STRIP.AUTO: NEGATIVE
LYMPHOCYTES # BLD: 0.9 K/UL (ref 0.9–3.6)
LYMPHOCYTES NFR BLD: 13 % (ref 21–52)
MAGNESIUM SERPL-MCNC: 2.5 MG/DL (ref 1.6–2.6)
MCH RBC QN AUTO: 29.9 PG (ref 24–34)
MCHC RBC AUTO-ENTMCNC: 33.3 G/DL (ref 31–37)
MCV RBC AUTO: 89.7 FL (ref 78–100)
MONOCYTES # BLD: 0.7 K/UL (ref 0.05–1.2)
MONOCYTES NFR BLD: 10 % (ref 3–10)
NEUTS SEG # BLD: 5.1 K/UL (ref 1.8–8)
NEUTS SEG NFR BLD: 75 % (ref 40–73)
NITRITE UR QL STRIP.AUTO: NEGATIVE
NRBC # BLD: 0 K/UL (ref 0–0.01)
NRBC BLD-RTO: 0 PER 100 WBC
PH UR STRIP: 8 (ref 5–8)
PLATELET # BLD AUTO: 278 K/UL (ref 135–420)
PMV BLD AUTO: 9.1 FL (ref 9.2–11.8)
POTASSIUM SERPL-SCNC: 4.8 MMOL/L (ref 3.5–5.5)
PROT SERPL-MCNC: 6.6 G/DL (ref 6.4–8.2)
PROT UR STRIP-MCNC: 300 MG/DL
RBC # BLD AUTO: 3.11 M/UL (ref 4.35–5.65)
RBC #/AREA URNS HPF: NORMAL /HPF (ref 0–5)
SARS-COV-2 RDRP RESP QL NAA+PROBE: NOT DETECTED
SODIUM SERPL-SCNC: 128 MMOL/L (ref 136–145)
SOURCE: NORMAL
SP GR UR REFRACTOMETRY: 1.01 (ref 1–1.03)
TROPONIN I SERPL HS-MCNC: 27 NG/L (ref 0–78)
UROBILINOGEN UR QL STRIP.AUTO: 0.2 EU/DL (ref 0.2–1)
WBC # BLD AUTO: 6.8 K/UL (ref 4.6–13.2)

## 2024-02-19 PROCEDURE — 87635 SARS-COV-2 COVID-19 AMP PRB: CPT

## 2024-02-19 PROCEDURE — 81001 URINALYSIS AUTO W/SCOPE: CPT

## 2024-02-19 PROCEDURE — 80053 COMPREHEN METABOLIC PANEL: CPT

## 2024-02-19 PROCEDURE — 93005 ELECTROCARDIOGRAM TRACING: CPT

## 2024-02-19 PROCEDURE — 74176 CT ABD & PELVIS W/O CONTRAST: CPT

## 2024-02-19 PROCEDURE — 85025 COMPLETE CBC W/AUTO DIFF WBC: CPT

## 2024-02-19 PROCEDURE — 84484 ASSAY OF TROPONIN QUANT: CPT

## 2024-02-19 PROCEDURE — 83735 ASSAY OF MAGNESIUM: CPT

## 2024-02-19 PROCEDURE — 99284 EMERGENCY DEPT VISIT MOD MDM: CPT

## 2024-02-19 PROCEDURE — 87086 URINE CULTURE/COLONY COUNT: CPT

## 2024-02-19 PROCEDURE — 87804 INFLUENZA ASSAY W/OPTIC: CPT

## 2024-02-19 ASSESSMENT — PAIN DESCRIPTION - ORIENTATION: ORIENTATION: LEFT;RIGHT

## 2024-02-19 ASSESSMENT — LIFESTYLE VARIABLES
HOW OFTEN DO YOU HAVE A DRINK CONTAINING ALCOHOL: MONTHLY OR LESS
HOW MANY STANDARD DRINKS CONTAINING ALCOHOL DO YOU HAVE ON A TYPICAL DAY: 1 OR 2

## 2024-02-19 ASSESSMENT — PAIN DESCRIPTION - PAIN TYPE: TYPE: ACUTE PAIN

## 2024-02-19 ASSESSMENT — PAIN - FUNCTIONAL ASSESSMENT: PAIN_FUNCTIONAL_ASSESSMENT: 0-10

## 2024-02-19 ASSESSMENT — PAIN DESCRIPTION - LOCATION: LOCATION: FLANK

## 2024-02-19 NOTE — ED PROVIDER NOTES
EMERGENCY DEPARTMENT HISTORY AND PHYSICAL EXAM      Patient Name: Teodoro Nava  MRN: [id number]  YOB: 1965  Provider: Yvette Davila PA-C  PCP: Zi Alcala MD   Time/Date of evaluation: 5:44 PM EST on 2/19/24    History of Presenting Illness     Chief Complaint   Patient presents with    Flank Pain    Nausea    Fatigue       History Provided By: Patient     History (Narative):   Teodoro Nava is a 58 y.o. male with a PMHX of of anemia, chronic kidney disease, ESRD on dialysis Tuesday Thursday Saturday, hypertension  who presents to the emergency department  by POV C/O of generalized malaise, bilateral flank pain, nausea, and abdominal pain.  Patient states that he is predominantly started with left-sided flank pain that radiated to his abdomen associated with nausea, and generalized malaise.  And weakness.  Patient states that he just recently returned from a vacation in [place].  Denies taking any over-the-counter medications, fever, chills, vomiting, melena, hematemesis, sick contact, shortness of breath, chest pain, edema    Past History     Past Medical History:  Past Medical History:   Diagnosis Date    Anemia     Bipolar disorder ([facility])     Dr. Arceo since 1984    CKD (chronic kidney disease) 2007    Dr. Sena; Dr Garcia 2021 bx FSGS    Colon adenoma     and diverticulosis 7/15 Dr John AVINA-19 virus infection 09/2021    Degenerative arthritis of spine 01/2016    on t and l spine films SARTHAK    Dyslipidemia     calculated 10 yr risk score was 5.9% (4/14); 5.4% (11/14); 4.3% (5/15); 6.7% (5/16); 7.3% (11/16)    ED (erectile dysfunction) 08/10/2017    ESRD on hemodialysis ([facility]) 08/2023    Dr Garcia    H/O cardiovascular stress test     ETT neg 2004; ETT neg 2009    Hypertension     IFG (impaired fasting glucose) 11/23/2015    Lumbar radiculitis 2020    on emg Dr Goldberg    Myofascial pain 2016    Dr Shanks    Obesity     IF 11/18 start weight 222 lbs    LALITO on CPAP

## 2024-02-19 NOTE — ED TRIAGE NOTES
Pt c/o generalized weakness, bilateral flank pain, and nausea x 2-3 days. Pt states he is a hemodialysis pt, last dialyzed on Saturday. Denies fevers. Pt relates having a mucoid stool just prior to coming to ED.

## 2024-02-20 LAB
BACTERIA SPEC CULT: NORMAL
EKG ATRIAL RATE: 67 BPM
EKG DIAGNOSIS: NORMAL
EKG P AXIS: 43 DEGREES
EKG P-R INTERVAL: 228 MS
EKG Q-T INTERVAL: 458 MS
EKG QRS DURATION: 108 MS
EKG QTC CALCULATION (BAZETT): 483 MS
EKG R AXIS: 57 DEGREES
EKG T AXIS: 21 DEGREES
EKG VENTRICULAR RATE: 67 BPM
SERVICE CMNT-IMP: NORMAL

## 2024-02-20 PROCEDURE — 93010 ELECTROCARDIOGRAM REPORT: CPT | Performed by: INTERNAL MEDICINE

## 2024-04-16 RX ORDER — VIT B COMP NO.3/FOLIC/C/BIOTIN 1 MG-60 MG
1 TABLET ORAL DAILY
COMMUNITY
Start: 2023-08-24

## 2024-04-16 NOTE — PROGRESS NOTES
Instructions for your procedure at [facility]      Today's Date: 4/16/2024      Patient's Name: Teodoro Nava      Procedure Date: May 1        Please enter the main entrance of the hospital and check-in at the  located in the lobby.      Do NOT eat or drink anything, including candy, gum, or ice chips after midnight prior to your procedure, unless it is part of your prep.  Brush your teeth before coming to the hospital.You may swish with water, but do not swallow.  No smoking/Vaping/E-Cigarettes 24 hours prior to the day of procedure.  No alcohol 24 hours prior to the day of procedure.  No recreational drugs for one week prior to the day of procedure.  Bring Photo ID, Insurance information, and Co-pay if required on day of procedure.  Bring in pertinent legal documents, such as, Medical Power of , DNR, Advance Directive, etc.  Leave all other valuables, including money/purse, at home.  Remove jewelry, including ALL body piercings, nail polish, acrylic nails, and makeup (including mascara); no lotions, powders, deodorant, and/or perfume/cologne/after shave on the skin.  Glasses and dentures may be worn to the hospital.  They must be removed prior to procedure. Please bring case/container for glasses or dentures.  11. Contacts should not be worn on day of procedure.   12. Call the office ([phone]) if you have symptoms of a cold or illness within 24-48 hours prior to your procedure.   13. AN ADULT (relative or friend 18 years or older) MUST DRIVE YOU HOME AFTER YOUR PROCEDURE.   14. Please make arrangements for a responsible adult (18 years or older) to be with you for 24 hours after your procedure.   15. ONE VISITOR will be allowed in the waiting area during your procedure.       Special Instructions:      Bring list of CURRENT medications.  Follow instructions from the office regarding Bowel Prep, Vitamins, Iron, Blood Thinners, Insulin, Seizure, and Blood

## 2024-04-30 ENCOUNTER — ANESTHESIA EVENT (OUTPATIENT)
Facility: HOSPITAL | Age: 59
End: 2024-04-30
Payer: COMMERCIAL

## 2024-05-01 ENCOUNTER — ANESTHESIA (OUTPATIENT)
Facility: HOSPITAL | Age: 59
End: 2024-05-01
Payer: COMMERCIAL

## 2024-05-01 ENCOUNTER — HOSPITAL ENCOUNTER (OUTPATIENT)
Facility: HOSPITAL | Age: 59
Setting detail: OUTPATIENT SURGERY
Discharge: HOME | End: 2024-05-01
Attending: STUDENT IN AN ORGANIZED HEALTH CARE EDUCATION/TRAINING PROGRAM | Admitting: STUDENT IN AN ORGANIZED HEALTH CARE EDUCATION/TRAINING PROGRAM
Payer: COMMERCIAL

## 2024-05-01 VITALS
TEMPERATURE: 97.8 F | RESPIRATION RATE: 18 BRPM | WEIGHT: 200 LBS | HEIGHT: 69 IN | BODY MASS INDEX: 29.62 KG/M2 | SYSTOLIC BLOOD PRESSURE: 101 MMHG | OXYGEN SATURATION: 100 % | HEART RATE: 65 BPM | DIASTOLIC BLOOD PRESSURE: 51 MMHG

## 2024-05-01 LAB
ANION GAP SERPL CALC-SCNC: 9 MMOL/L (ref 3–18)
BUN SERPL-MCNC: 25 MG/DL (ref 7–18)
BUN/CREAT SERPL: 4 (ref 12–20)
CALCIUM SERPL-MCNC: 9.7 MG/DL (ref 8.5–10.1)
CHLORIDE SERPL-SCNC: 92 MMOL/L (ref 100–111)
CO2 SERPL-SCNC: 31 MMOL/L (ref 21–32)
CREAT SERPL-MCNC: 6.29 MG/DL (ref 0.6–1.3)
GLUCOSE SERPL-MCNC: 96 MG/DL (ref 74–99)
POTASSIUM SERPL-SCNC: 4.6 MMOL/L (ref 3.5–5.5)
SODIUM SERPL-SCNC: 132 MMOL/L (ref 136–145)

## 2024-05-01 PROCEDURE — 6360000002 HC RX W HCPCS: Performed by: NURSE ANESTHETIST, CERTIFIED REGISTERED

## 2024-05-01 PROCEDURE — 3700000000 HC ANESTHESIA ATTENDED CARE: Performed by: STUDENT IN AN ORGANIZED HEALTH CARE EDUCATION/TRAINING PROGRAM

## 2024-05-01 PROCEDURE — 80048 BASIC METABOLIC PNL TOTAL CA: CPT

## 2024-05-01 PROCEDURE — 88305 TISSUE EXAM BY PATHOLOGIST: CPT

## 2024-05-01 PROCEDURE — 3600007512: Performed by: STUDENT IN AN ORGANIZED HEALTH CARE EDUCATION/TRAINING PROGRAM

## 2024-05-01 PROCEDURE — 7100000010 HC PHASE II RECOVERY - FIRST 15 MIN: Performed by: STUDENT IN AN ORGANIZED HEALTH CARE EDUCATION/TRAINING PROGRAM

## 2024-05-01 PROCEDURE — 3600007502: Performed by: STUDENT IN AN ORGANIZED HEALTH CARE EDUCATION/TRAINING PROGRAM

## 2024-05-01 PROCEDURE — 2709999900 HC NON-CHARGEABLE SUPPLY: Performed by: STUDENT IN AN ORGANIZED HEALTH CARE EDUCATION/TRAINING PROGRAM

## 2024-05-01 PROCEDURE — 3700000001 HC ADD 15 MINUTES (ANESTHESIA): Performed by: STUDENT IN AN ORGANIZED HEALTH CARE EDUCATION/TRAINING PROGRAM

## 2024-05-01 PROCEDURE — 2500000003 HC RX 250 WO HCPCS: Performed by: NURSE ANESTHETIST, CERTIFIED REGISTERED

## 2024-05-01 PROCEDURE — 7100000000 HC PACU RECOVERY - FIRST 15 MIN: Performed by: STUDENT IN AN ORGANIZED HEALTH CARE EDUCATION/TRAINING PROGRAM

## 2024-05-01 PROCEDURE — 2580000003 HC RX 258: Performed by: NURSE ANESTHETIST, CERTIFIED REGISTERED

## 2024-05-01 RX ORDER — PROPOFOL 10 MG/ML
INJECTION, EMULSION INTRAVENOUS PRN
Status: DISCONTINUED | OUTPATIENT
Start: 2024-05-01 | End: 2024-05-01 | Stop reason: SDUPTHER

## 2024-05-01 RX ORDER — FAMOTIDINE 20 MG/1
20 TABLET, FILM COATED ORAL ONCE
Status: DISCONTINUED | OUTPATIENT
Start: 2024-05-01 | End: 2024-05-01 | Stop reason: HOSPADM

## 2024-05-01 RX ORDER — LIDOCAINE HYDROCHLORIDE 10 MG/ML
1 INJECTION, SOLUTION EPIDURAL; INFILTRATION; INTRACAUDAL; PERINEURAL
Status: DISCONTINUED | OUTPATIENT
Start: 2024-05-01 | End: 2024-05-01 | Stop reason: HOSPADM

## 2024-05-01 RX ORDER — SODIUM CHLORIDE 0.9 % (FLUSH) 0.9 %
5-40 SYRINGE (ML) INJECTION PRN
Status: DISCONTINUED | OUTPATIENT
Start: 2024-05-01 | End: 2024-05-01 | Stop reason: HOSPADM

## 2024-05-01 RX ORDER — LIDOCAINE HYDROCHLORIDE 20 MG/ML
INJECTION, SOLUTION EPIDURAL; INFILTRATION; INTRACAUDAL; PERINEURAL PRN
Status: DISCONTINUED | OUTPATIENT
Start: 2024-05-01 | End: 2024-05-01 | Stop reason: SDUPTHER

## 2024-05-01 RX ORDER — SODIUM CHLORIDE 9 MG/ML
INJECTION, SOLUTION INTRAVENOUS CONTINUOUS
Status: DISCONTINUED | OUTPATIENT
Start: 2024-05-01 | End: 2024-05-01 | Stop reason: HOSPADM

## 2024-05-01 RX ADMIN — PROPOFOL 30 MG: 10 INJECTION, EMULSION INTRAVENOUS at 08:24

## 2024-05-01 RX ADMIN — PROPOFOL 25 MG: 10 INJECTION, EMULSION INTRAVENOUS at 08:29

## 2024-05-01 RX ADMIN — LIDOCAINE HYDROCHLORIDE 40 MG: 20 INJECTION, SOLUTION EPIDURAL; INFILTRATION; INTRACAUDAL; PERINEURAL at 08:11

## 2024-05-01 RX ADMIN — PROPOFOL 25 MG: 10 INJECTION, EMULSION INTRAVENOUS at 08:14

## 2024-05-01 RX ADMIN — PROPOFOL 20 MG: 10 INJECTION, EMULSION INTRAVENOUS at 08:21

## 2024-05-01 RX ADMIN — PROPOFOL 25 MG: 10 INJECTION, EMULSION INTRAVENOUS at 08:12

## 2024-05-01 RX ADMIN — SODIUM CHLORIDE: 9 INJECTION, SOLUTION INTRAVENOUS at 07:43

## 2024-05-01 RX ADMIN — PROPOFOL 25 MG: 10 INJECTION, EMULSION INTRAVENOUS at 08:17

## 2024-05-01 RX ADMIN — PROPOFOL 25 MG: 10 INJECTION, EMULSION INTRAVENOUS at 08:13

## 2024-05-01 RX ADMIN — PROPOFOL 20 MG: 10 INJECTION, EMULSION INTRAVENOUS at 08:19

## 2024-05-01 RX ADMIN — PROPOFOL 80 MG: 10 INJECTION, EMULSION INTRAVENOUS at 08:11

## 2024-05-01 RX ADMIN — PROPOFOL 25 MG: 10 INJECTION, EMULSION INTRAVENOUS at 08:27

## 2024-05-01 ASSESSMENT — PAIN SCALES - GENERAL
PAINLEVEL_OUTOF10: 0
PAINLEVEL_OUTOF10: 0

## 2024-05-01 ASSESSMENT — PAIN - FUNCTIONAL ASSESSMENT
PAIN_FUNCTIONAL_ASSESSMENT: 0-10
PAIN_FUNCTIONAL_ASSESSMENT: 0-10

## 2024-05-01 NOTE — H&P
[email or web address]  [phone]    Chief Complaint: Polyp surveillance    PMH:   Past Medical History:   Diagnosis Date    Anemia     Bipolar disorder ([facility])     Dr. Arceo since 1984    Chronic hyponatremia     CKD (chronic kidney disease) 2007    Dr. Sena; proteinuria 2011; Dr Garcia 2021 bx FSGS    Colon adenoma     and diverticulosis 7/15 Dr John AVINA-19 virus infection 09/2021    Degenerative arthritis of spine 01/2016    on t and l spine films SARTHAK    Dyslipidemia     calculated 10 yr risk score was 5.9% (4/14); 5.4% (11/14); 4.3% (5/15); 6.7% (5/16); 7.3% (11/16)    ED (erectile dysfunction) 08/10/2017    ESRD on hemodialysis ([facility]) 08/2023    Dr Garcia, Tuesday, Thursday and Saturday [facility]  [phone]    H/O cardiovascular stress test     ETT neg 2004; ETT neg 2009    Hypertension     IFG (impaired fasting glucose) 11/23/2015    Lumbar radiculitis 2020    on emg Dr Goldberg    Myofascial pain 2016    Dr Shanks    Obesity     IF 11/18 start weight 222 lbs    LALITO on CPAP     Dr Newman-currently not using per pt    Subacromial bursitis     right Dr. Duvall     Allergies:   Allergies   Allergen Reactions    Seasonal      Nasal congestion  Watery eyes     Medications:   Current Facility-Administered Medications:     lidocaine PF 1 % injection 1 mL, 1 mL, IntraDERmal, Once PRN, TalhaasGladisan A, APRN - CRNA    famotidine (PEPCID) tablet 20 mg, 20 mg, Oral, Once, Gladis Jallohan A, APRN - CRNA    sodium chloride flush 0.9 % injection 5-40 mL, 5-40 mL, IntraVENous, PRN, Caras, Brennen A, APRN - CRNA    0.9 % sodium chloride infusion, , IntraVENous, Continuous, Caras, Brennen A, APRN - CRNA  FH:   Family History   Problem Relation Age of Onset    Diabetes Father     Elevated Lipids Father     Cancer Mother         breast     Social:   Social History     Socioeconomic History    Marital status:      Spouse name: None    Number of children: None    Years of education: None    Highest education

## 2024-05-01 NOTE — ANESTHESIA PRE PROCEDURE
(192 lb)   10/30/23 91.2 kg (201 lb)     Body mass index is 29.53 kg/m².    CBC:   Lab Results   Component Value Date/Time    WBC 6.8 02/19/2024 06:25 PM    RBC 3.11 02/19/2024 06:25 PM    HGB 9.3 02/19/2024 06:25 PM    HCT 27.9 02/19/2024 06:25 PM    MCV 89.7 02/19/2024 06:25 PM    RDW 15.9 02/19/2024 06:25 PM     02/19/2024 06:25 PM       CMP:   Lab Results   Component Value Date/Time     05/01/2024 07:31 AM    K 4.6 05/01/2024 07:31 AM    CL 92 05/01/2024 07:31 AM    CO2 31 05/01/2024 07:31 AM    BUN 25 05/01/2024 07:31 AM    CREATININE 6.29 05/01/2024 07:31 AM    GFRAA 19 07/07/2022 08:23 AM    AGRATIO 1.5 01/16/2023 08:03 AM    GLUCOSE 96 05/01/2024 07:31 AM    CALCIUM 9.7 05/01/2024 07:31 AM    BILITOT 0.5 02/19/2024 06:25 PM    ALKPHOS 127 02/19/2024 06:25 PM    ALKPHOS 120 09/15/2023 01:12 PM    AST 15 02/19/2024 06:25 PM    ALT 23 02/19/2024 06:25 PM       POC Tests: No results for input(s): \"POCGLU\", \"POCNA\", \"POCK\", \"POCCL\", \"POCBUN\", \"POCHEMO\", \"POCHCT\" in the last 72 hours.    Coags:   Lab Results   Component Value Date/Time    PROTIME 13.7 05/09/2023 12:51 PM    INR 1.0 05/09/2023 12:51 PM    APTT 31.9 10/25/2021 07:35 AM       HCG (If Applicable): No results found for: \"PREGTESTUR\", \"PREGSERUM\", \"HCG\", \"HCGQUANT\"     ABGs: No results found for: \"PHART\", \"PO2ART\", \"XHW0EWT\", \"NMP5IES\", \"BEART\", \"I2RGDTOV\"     Type & Screen (If Applicable):  No results found for: \"LABABO\"    Drug/Infectious Status (If Applicable):  Lab Results   Component Value Date/Time    HEPCAB .02 08/09/2023 03:17 PM       COVID-19 Screening (If Applicable):   Lab Results   Component Value Date/Time    COVID19 Not detected 02/19/2024 07:25 PM           Anesthesia Evaluation  Patient summary reviewed  Airway: Mallampati: I          Dental: normal exam         Pulmonary:Negative Pulmonary ROS and normal exam    (+)     sleep apnea:                                  Cardiovascular:Negative CV ROS  Exercise tolerance: good

## 2024-05-01 NOTE — ANESTHESIA POSTPROCEDURE EVALUATION
Department of Anesthesiology  Postprocedure Note    Patient: Teodoro Nava  MRN: [id number]  YOB: 1965  Date of evaluation: 5/1/2024    Procedure Summary       Date: 05/01/24 Room / Location: [facility] ENDO 02 / [facility] ENDOSCOPY    Anesthesia Start: 0801 Anesthesia Stop: 0835    Procedure: COLONOSCOPY with polypectomies (Abdomen) Diagnosis:       Personal history of colonic polyps      ESRD (end stage renal disease) (HCC)      (Personal history of colonic polyps [Z86.010])      (ESRD (end stage renal disease) (HCC) [N18.6])    Surgeons: Inder Maya MD Responsible Provider: Per Garcia MD    Anesthesia Type: MAC ASA Status: 4            Anesthesia Type: No value filed.    Uprnima Phase I: Purnima Score: 8    Purnima Phase II: Purnima Score: 10    Anesthesia Post Evaluation    Patient location during evaluation: bedside  Patient participation: complete - patient participated  Level of consciousness: awake  Airway patency: patent  Nausea & Vomiting: no nausea  Cardiovascular status: hemodynamically stable  Respiratory status: acceptable  Hydration status: euvolemic  Pain management: satisfactory to patient        No notable events documented.

## 2024-05-13 ENCOUNTER — TELEPHONE (OUTPATIENT)
Age: 59
End: 2024-05-13

## 2024-05-13 DIAGNOSIS — Z00.00 PHYSICAL EXAM: Primary | ICD-10-CM

## 2024-05-31 ENCOUNTER — HOSPITAL ENCOUNTER (OUTPATIENT)
Facility: HOSPITAL | Age: 59
End: 2024-05-31
Payer: MEDICARE

## 2024-05-31 DIAGNOSIS — Z00.00 PHYSICAL EXAM: ICD-10-CM

## 2024-05-31 LAB
ALBUMIN SERPL-MCNC: 4 G/DL (ref 3.4–5)
ALBUMIN/GLOB SERPL: 1.4 (ref 0.8–1.7)
ALP SERPL-CCNC: 141 U/L (ref 45–117)
ALT SERPL-CCNC: 21 U/L (ref 16–61)
ANION GAP SERPL CALC-SCNC: 8 MMOL/L (ref 3–18)
AST SERPL-CCNC: 14 U/L (ref 10–38)
BASOPHILS # BLD: 0 K/UL (ref 0–0.1)
BASOPHILS NFR BLD: 0 % (ref 0–2)
BILIRUB SERPL-MCNC: 0.9 MG/DL (ref 0.2–1)
BUN SERPL-MCNC: 27 MG/DL (ref 7–18)
BUN/CREAT SERPL: 4 (ref 12–20)
CALCIUM SERPL-MCNC: 10 MG/DL (ref 8.5–10.1)
CHLORIDE SERPL-SCNC: 93 MMOL/L (ref 100–111)
CHOLEST SERPL-MCNC: 156 MG/DL
CO2 SERPL-SCNC: 32 MMOL/L (ref 21–32)
CREAT SERPL-MCNC: 6.89 MG/DL (ref 0.6–1.3)
DIFFERENTIAL METHOD BLD: ABNORMAL
EOSINOPHIL # BLD: 0.1 K/UL (ref 0–0.4)
EOSINOPHIL NFR BLD: 2 % (ref 0–5)
ERYTHROCYTE [DISTWIDTH] IN BLOOD BY AUTOMATED COUNT: 16.5 % (ref 11.6–14.5)
GLOBULIN SER CALC-MCNC: 2.8 G/DL (ref 2–4)
GLUCOSE SERPL-MCNC: 104 MG/DL (ref 74–99)
HBA1C MFR BLD: 4.7 % (ref 4.2–5.6)
HCT VFR BLD AUTO: 35.5 % (ref 36–48)
HDLC SERPL-MCNC: 51 MG/DL (ref 40–60)
HDLC SERPL: 3.1 (ref 0–5)
HGB BLD-MCNC: 11.2 G/DL (ref 13–16)
IMM GRANULOCYTES # BLD AUTO: 0 K/UL (ref 0–0.04)
IMM GRANULOCYTES NFR BLD AUTO: 0 % (ref 0–0.5)
LDLC SERPL CALC-MCNC: 71 MG/DL (ref 0–100)
LIPID PANEL: ABNORMAL
LYMPHOCYTES # BLD: 0.7 K/UL (ref 0.9–3.6)
LYMPHOCYTES NFR BLD: 14 % (ref 21–52)
MCH RBC QN AUTO: 29.6 PG (ref 24–34)
MCHC RBC AUTO-ENTMCNC: 31.5 G/DL (ref 31–37)
MCV RBC AUTO: 93.7 FL (ref 78–100)
MONOCYTES # BLD: 0.5 K/UL (ref 0.05–1.2)
MONOCYTES NFR BLD: 9 % (ref 3–10)
NEUTS SEG # BLD: 3.9 K/UL (ref 1.8–8)
NEUTS SEG NFR BLD: 75 % (ref 40–73)
NRBC # BLD: 0 K/UL (ref 0–0.01)
NRBC BLD-RTO: 0 PER 100 WBC
PLATELET # BLD AUTO: 219 K/UL (ref 135–420)
PMV BLD AUTO: 9.6 FL (ref 9.2–11.8)
POTASSIUM SERPL-SCNC: 4.8 MMOL/L (ref 3.5–5.5)
PROT SERPL-MCNC: 6.8 G/DL (ref 6.4–8.2)
PSA SERPL-MCNC: 0.5 NG/ML (ref 0–4)
RBC # BLD AUTO: 3.79 M/UL (ref 4.35–5.65)
SODIUM SERPL-SCNC: 133 MMOL/L (ref 136–145)
TRIGL SERPL-MCNC: 170 MG/DL
VLDLC SERPL CALC-MCNC: 34 MG/DL
WBC # BLD AUTO: 5.2 K/UL (ref 4.6–13.2)

## 2024-05-31 PROCEDURE — 80061 LIPID PANEL: CPT

## 2024-05-31 PROCEDURE — 36415 COLL VENOUS BLD VENIPUNCTURE: CPT

## 2024-05-31 PROCEDURE — 85025 COMPLETE CBC W/AUTO DIFF WBC: CPT

## 2024-05-31 PROCEDURE — G0103 PSA SCREENING: HCPCS

## 2024-05-31 PROCEDURE — 80053 COMPREHEN METABOLIC PANEL: CPT

## 2024-05-31 PROCEDURE — 83036 HEMOGLOBIN GLYCOSYLATED A1C: CPT

## 2024-06-07 PROBLEM — Z99.2 ESRD ON HEMODIALYSIS (HCC): Status: ACTIVE | Noted: 2023-05-15

## 2024-06-24 ENCOUNTER — HOSPITAL ENCOUNTER (OUTPATIENT)
Facility: HOSPITAL | Age: 59
Discharge: HOME | End: 2024-06-26
Attending: INTERNAL MEDICINE
Payer: COMMERCIAL

## 2024-06-24 DIAGNOSIS — I73.9 LEFT LEG CLAUDICATION (HCC): ICD-10-CM

## 2024-06-24 DIAGNOSIS — I73.9 RIGHT LEG CLAUDICATION (HCC): ICD-10-CM

## 2024-06-24 LAB
VAS LEFT ATA DIST PSV: 131.7 CM/S
VAS LEFT ATA MID PSV: 51.7 CM/S
VAS LEFT ATA PROX PSV: 39.5 CM/S
VAS LEFT CFA DIST PSV: 97.1 CM/S
VAS LEFT CFA PROX PSV: 74.8 CM/S
VAS LEFT PERONEAL DIST PSV: 90.9 CM/S
VAS LEFT PERONEAL MID PSV: 79.6 CM/S
VAS LEFT PERONEAL PROX PSV: 218.4 CM/S
VAS LEFT PFA PROX PSV: 68.4 CM/S
VAS LEFT POP A DIST PSV: 67.4 CM/S
VAS LEFT POP A PROX PSV: 74.1 CM/S
VAS LEFT POP A PROX VEL RATIO: 0.95
VAS LEFT PTA DIST PSV: 96.1 CM/S
VAS LEFT PTA MID PSV: 88.3 CM/S
VAS LEFT PTA PROX PSV: 79.3 CM/S
VAS LEFT SFA DIST PSV: 78 CM/S
VAS LEFT SFA DIST VEL RATIO: 0.6
VAS LEFT SFA MID PSV: 130.7 CM/S
VAS LEFT SFA MID VEL RATIO: 1.49
VAS LEFT SFA PROX PSV: 88 CM/S
VAS LEFT SFA PROX VEL RATIO: 0.91
VAS RIGHT ATA DIST PSV: 45.8 CM/S
VAS RIGHT ATA MID PSV: 31.6 CM/S
VAS RIGHT ATA PROX PSV: 79.8 CM/S
VAS RIGHT CFA DIST PSV: 87.2 CM/S
VAS RIGHT CFA PROX PSV: 97.3 CM/S
VAS RIGHT PERONEAL DIST PSV: 62.5 CM/S
VAS RIGHT PERONEAL MID PSV: 40.5 CM/S
VAS RIGHT PERONEAL PROX PSV: 35.1 CM/S
VAS RIGHT PFA PROX PSV: 75.4 CM/S
VAS RIGHT POP A DIST PSV: 47 CM/S
VAS RIGHT POP A PROX PSV: 62 CM/S
VAS RIGHT POP A PROX VEL RATIO: 0.89
VAS RIGHT PTA DIST PSV: 304.9 CM/S
VAS RIGHT PTA MID PSV: 47 CM/S
VAS RIGHT PTA PROX PSV: 51.4 CM/S
VAS RIGHT SFA DIST PSV: 69.8 CM/S
VAS RIGHT SFA DIST VEL RATIO: 0.46
VAS RIGHT SFA MID PSV: 153.1 CM/S
VAS RIGHT SFA MID VEL RATIO: 1.7
VAS RIGHT SFA PROX PSV: 88.7 CM/S
VAS RIGHT SFA PROX VEL RATIO: 0.9

## 2024-06-24 PROCEDURE — 93925 LOWER EXTREMITY STUDY: CPT

## 2024-06-24 PROCEDURE — 93925 LOWER EXTREMITY STUDY: CPT | Performed by: INTERNAL MEDICINE

## 2024-06-30 ENCOUNTER — TELEPHONE (OUTPATIENT)
Facility: CLINIC | Age: 59
End: 2024-06-30

## 2024-06-30 DIAGNOSIS — I73.9 PAD (PERIPHERAL ARTERY DISEASE) (HCC): Primary | ICD-10-CM

## 2024-07-05 ENCOUNTER — TELEPHONE (OUTPATIENT)
Facility: CLINIC | Age: 59
End: 2024-07-05

## 2024-07-05 NOTE — TELEPHONE ENCOUNTER
He was called last week, Audigence message sent as well       Signed         Pls all     BLE periph artery disease on duplex study  Sched Dr Estevez pls

## 2024-07-05 NOTE — TELEPHONE ENCOUNTER
Called and spoke to patient, relayed message from Dr. Alcala. Patient states he has surgery at vascular on Monday and will inquire about the new diagnosis.    No further action required.

## 2024-08-16 ENCOUNTER — OFFICE VISIT (OUTPATIENT)
Facility: CLINIC | Age: 59
End: 2024-08-16
Payer: MEDICARE

## 2024-08-16 VITALS
BODY MASS INDEX: 30.66 KG/M2 | RESPIRATION RATE: 16 BRPM | OXYGEN SATURATION: 95 % | DIASTOLIC BLOOD PRESSURE: 77 MMHG | HEIGHT: 69 IN | HEART RATE: 62 BPM | WEIGHT: 207 LBS | TEMPERATURE: 98.5 F | SYSTOLIC BLOOD PRESSURE: 132 MMHG

## 2024-08-16 DIAGNOSIS — N52.9 ERECTILE DYSFUNCTION, UNSPECIFIED ERECTILE DYSFUNCTION TYPE: Primary | ICD-10-CM

## 2024-08-16 PROCEDURE — G8427 DOCREV CUR MEDS BY ELIG CLIN: HCPCS | Performed by: INTERNAL MEDICINE

## 2024-08-16 PROCEDURE — G8417 CALC BMI ABV UP PARAM F/U: HCPCS | Performed by: INTERNAL MEDICINE

## 2024-08-16 PROCEDURE — 3078F DIAST BP <80 MM HG: CPT | Performed by: INTERNAL MEDICINE

## 2024-08-16 PROCEDURE — 99213 OFFICE O/P EST LOW 20 MIN: CPT | Performed by: INTERNAL MEDICINE

## 2024-08-16 PROCEDURE — 3017F COLORECTAL CA SCREEN DOC REV: CPT | Performed by: INTERNAL MEDICINE

## 2024-08-16 PROCEDURE — 3075F SYST BP GE 130 - 139MM HG: CPT | Performed by: INTERNAL MEDICINE

## 2024-08-16 PROCEDURE — 1036F TOBACCO NON-USER: CPT | Performed by: INTERNAL MEDICINE

## 2024-08-16 RX ORDER — SILDENAFIL 100 MG/1
100 TABLET, FILM COATED ORAL DAILY PRN
Qty: 10 TABLET | Refills: 5 | Status: SHIPPED | OUTPATIENT
Start: 2024-08-16

## 2024-08-16 NOTE — PROGRESS NOTES
Teodoro Nava presents today for   Chief Complaint   Patient presents with    Erectile Dysfunction       \"Have you been to the ER, urgent care clinic since your last visit?  Hospitalized since your last visit?\"    NO    “Have you seen or consulted any other health care providers outside of [facility] since your last visit?”    NO             
answered to patient satisfaction       Diagnosis Orders   1. Erectile dysfunction, unspecified erectile dysfunction type  sildenafil (VIAGRA) 100 MG tablet

## 2024-09-11 RX ORDER — ATORVASTATIN CALCIUM 40 MG/1
40 TABLET, FILM COATED ORAL DAILY
Qty: 90 TABLET | Refills: 3 | Status: SHIPPED | OUTPATIENT
Start: 2024-09-11

## 2025-01-03 ENCOUNTER — TELEPHONE (OUTPATIENT)
Facility: CLINIC | Age: 60
End: 2025-01-03

## 2025-01-03 NOTE — TELEPHONE ENCOUNTER
Patient denies SOB, blurry vision, numbness of face HA's and distal joints. Patient ws informed that all providers scheduled was full. Patient stated that he will go to urgent care.

## 2025-01-03 NOTE — TELEPHONE ENCOUNTER
Patient contacted the office he has been having dizziness and loss of balance for the past few weeks now but has recently gotten worse the past few days. No blurred vision, requesting asap appointment. Please advise

## 2025-01-10 ENCOUNTER — CARE COORDINATION (OUTPATIENT)
Facility: CLINIC | Age: 60
End: 2025-01-10

## 2025-01-10 DIAGNOSIS — Z99.2 ESRD ON HEMODIALYSIS (HCC): Primary | ICD-10-CM

## 2025-01-10 DIAGNOSIS — N18.6 ESRD ON HEMODIALYSIS (HCC): Primary | ICD-10-CM

## 2025-01-10 PROCEDURE — 1111F DSCHRG MED/CURRENT MED MERGE: CPT | Performed by: INTERNAL MEDICINE

## 2025-01-10 RX ORDER — ALBUTEROL SULFATE 90 UG/1
2 INHALANT RESPIRATORY (INHALATION) EVERY 6 HOURS PRN
COMMUNITY

## 2025-01-10 RX ORDER — AZITHROMYCIN 500 MG/1
500 TABLET, FILM COATED ORAL DAILY
COMMUNITY

## 2025-01-10 RX ORDER — GABAPENTIN 300 MG/1
300 CAPSULE ORAL NIGHTLY
COMMUNITY

## 2025-01-10 RX ORDER — PREDNISONE 20 MG/1
20 TABLET ORAL DAILY
COMMUNITY

## 2025-01-10 NOTE — CARE COORDINATION
bring all his medications or list of medications to PCP appt.    /      Method of communication with provider: chart routing.    Patients top risk factors for readmission: recent hospital admission.     Interventions to address risk factors:   Continue to take all medications as prescribed. Call CTN or PCP office for any questions, concerns and/or needs.      Care Summary Note:     Patient reports that his bronchitis symptoms are better.   Pt. States that Symptoms are better.   Pt. New or worsening of symptoms per Pt.   Pt. Denied CP, SOB, fever and chills at this time.     Patient reports that he supposed to have outpatient PT. Pt. States that he does not have Rx or listed on his D/C paperwork's.     CTN Ms. Dimitri Sanchez [facility] inpatient case management. Ctn followed on Pt. Outpatient PT. Ms. Sanchez states that unit secretary gave Pt. Out Patient PT referral copy. Ms. Sanchez states that Patient can go to any [facility] Outpatient PT and the order is already in the system. Ms. Sanchez states that any [facility] facility outpatient can pull the order/referral from the system.     Ctn called Patient back to make aware. Pt. Thanked us.     No questions, concerns and/or needs at this time as per Pt.     Patient reminded that there is a physician on call 24 hours a day / 7 days a week (M-F 5pm to 8am and from Friday 5pm until Monday 8a for the weekend) should the patient have questions or concerns.  Patient reminded to call 911 if situation is emergent ( such as chest pain, shortness of breath, unstoppable bleeding, feeling of passing out,  worsening of symptoms)or patient feels the situation is emergent.  Pt verbalizes understanding.    Care Transition Nurse reviewed discharge instructions, medical action plan, and red flags with patient. The patient was given an opportunity to ask questions; all questions answered at this time.. The patient verbalized understanding.   Were discharge instructions available to patient?

## 2025-01-10 NOTE — CARE COORDINATION
Care Transitions Note    Initial Call - Call within 2 business days of discharge: Yes    Attempted to reach patient for transitions of care follow up. Unable to reach patient.    Outreach Attempts:   HIPAA compliant voicemail left for patient.     Patient: Teodoro Nava    Patient : 1965   MRN: [id number]    Reason for Admission: Ataxia (peripheral neuropathy versus medicine side effect)   Discharge Date: 24  RURS: No data recorded  Last Discharge Facility       Date Complaint Diagnosis Description Type Department Provider    24   Admission (Discharged) Inder Oglesby MD            Was this an external facility discharge? Yes. Discharge Date: 25. Facility Name: [facility]     Follow Up Appointment:   Patient has hospital follow up appointment scheduled within 14 days of discharge.    Future Appointments         Provider Specialty Dept Phone    2025 10:00 AM Zi Alcala MD Internal Medicine [phone]    2025 8:00 AM [facility] LAB VISIT Internal Medicine [phone]    2025 8:00 AM Zi Alcala MD Internal Medicine [phone]            Plan for follow-up on next business day.      Tray Christensen RN

## 2025-01-14 ENCOUNTER — APPOINTMENT (OUTPATIENT)
Facility: HOSPITAL | Age: 60
End: 2025-01-14
Payer: COMMERCIAL

## 2025-01-14 ENCOUNTER — HOSPITAL ENCOUNTER (EMERGENCY)
Facility: HOSPITAL | Age: 60
Discharge: HOME | End: 2025-01-14
Attending: EMERGENCY MEDICINE
Payer: COMMERCIAL

## 2025-01-14 VITALS
TEMPERATURE: 98.7 F | HEIGHT: 69 IN | OXYGEN SATURATION: 97 % | DIASTOLIC BLOOD PRESSURE: 78 MMHG | RESPIRATION RATE: 13 BRPM | WEIGHT: 207 LBS | SYSTOLIC BLOOD PRESSURE: 154 MMHG | BODY MASS INDEX: 30.66 KG/M2 | HEART RATE: 66 BPM

## 2025-01-14 DIAGNOSIS — S91.311A LACERATION OF RIGHT FOOT, INITIAL ENCOUNTER: ICD-10-CM

## 2025-01-14 DIAGNOSIS — J10.1 INFLUENZA A: Primary | ICD-10-CM

## 2025-01-14 LAB
ANION GAP SERPL CALC-SCNC: 11 MMOL/L (ref 3–18)
BASOPHILS # BLD: 0 K/UL (ref 0–0.1)
BASOPHILS NFR BLD: 0 % (ref 0–2)
BUN SERPL-MCNC: 85 MG/DL (ref 7–18)
BUN/CREAT SERPL: 8 (ref 12–20)
CA-I SERPL-SCNC: 1.12 MMOL/L (ref 1.15–1.33)
CALCIUM SERPL-MCNC: 8.5 MG/DL (ref 8.5–10.1)
CHLORIDE SERPL-SCNC: 95 MMOL/L (ref 100–111)
CO2 SERPL-SCNC: 28 MMOL/L (ref 21–32)
CREAT SERPL-MCNC: 11 MG/DL (ref 0.6–1.3)
DIFFERENTIAL METHOD BLD: ABNORMAL
EKG ATRIAL RATE: 67 BPM
EKG DIAGNOSIS: NORMAL
EKG P AXIS: 55 DEGREES
EKG P-R INTERVAL: 196 MS
EKG Q-T INTERVAL: 454 MS
EKG QRS DURATION: 112 MS
EKG QTC CALCULATION (BAZETT): 479 MS
EKG R AXIS: 67 DEGREES
EKG T AXIS: -56 DEGREES
EKG VENTRICULAR RATE: 67 BPM
EOSINOPHIL # BLD: 0.02 K/UL (ref 0–0.4)
EOSINOPHIL NFR BLD: 0.4 % (ref 0–5)
ERYTHROCYTE [DISTWIDTH] IN BLOOD BY AUTOMATED COUNT: 15.9 % (ref 11.6–14.5)
FLUAV RNA SPEC QL NAA+PROBE: DETECTED
FLUBV RNA SPEC QL NAA+PROBE: NOT DETECTED
GLUCOSE SERPL-MCNC: 124 MG/DL (ref 74–99)
HCT VFR BLD AUTO: 30.8 % (ref 36–48)
HGB BLD-MCNC: 10.3 G/DL (ref 13–16)
IMM GRANULOCYTES # BLD AUTO: 0.03 K/UL (ref 0–0.04)
IMM GRANULOCYTES NFR BLD AUTO: 0.6 % (ref 0–0.5)
LYMPHOCYTES # BLD: 0.71 K/UL (ref 0.9–3.6)
LYMPHOCYTES NFR BLD: 14.8 % (ref 21–52)
MAGNESIUM SERPL-MCNC: 2.8 MG/DL (ref 1.6–2.6)
MCH RBC QN AUTO: 30.8 PG (ref 24–34)
MCHC RBC AUTO-ENTMCNC: 33.4 G/DL (ref 31–37)
MCV RBC AUTO: 92.2 FL (ref 78–100)
MONOCYTES # BLD: 0.44 K/UL (ref 0.05–1.2)
MONOCYTES NFR BLD: 9.2 % (ref 3–10)
NEUTS SEG # BLD: 3.59 K/UL (ref 1.8–8)
NEUTS SEG NFR BLD: 75 % (ref 40–73)
NRBC # BLD: 0 K/UL (ref 0–0.01)
NRBC BLD-RTO: 0 PER 100 WBC
NT PRO BNP: 6900 PG/ML (ref 0–900)
PHOSPHATE SERPL-MCNC: 4.5 MG/DL (ref 2.5–4.9)
PLATELET # BLD AUTO: 240 K/UL (ref 135–420)
PMV BLD AUTO: 9.6 FL (ref 9.2–11.8)
POTASSIUM SERPL-SCNC: 4.6 MMOL/L (ref 3.5–5.5)
RBC # BLD AUTO: 3.34 M/UL (ref 4.35–5.65)
SARS-COV-2 RNA RESP QL NAA+PROBE: NOT DETECTED
SODIUM SERPL-SCNC: 134 MMOL/L (ref 136–145)
SOURCE: ABNORMAL
TROPONIN I SERPL HS-MCNC: 21 NG/L (ref 0–78)
WBC # BLD AUTO: 4.8 K/UL (ref 4.6–13.2)

## 2025-01-14 PROCEDURE — 82330 ASSAY OF CALCIUM: CPT

## 2025-01-14 PROCEDURE — 71045 X-RAY EXAM CHEST 1 VIEW: CPT

## 2025-01-14 PROCEDURE — 6370000000 HC RX 637 (ALT 250 FOR IP): Performed by: EMERGENCY MEDICINE

## 2025-01-14 PROCEDURE — 73630 X-RAY EXAM OF FOOT: CPT

## 2025-01-14 PROCEDURE — 85025 COMPLETE CBC W/AUTO DIFF WBC: CPT

## 2025-01-14 PROCEDURE — 83735 ASSAY OF MAGNESIUM: CPT

## 2025-01-14 PROCEDURE — 99285 EMERGENCY DEPT VISIT HI MDM: CPT

## 2025-01-14 PROCEDURE — 93010 ELECTROCARDIOGRAM REPORT: CPT | Performed by: INTERNAL MEDICINE

## 2025-01-14 PROCEDURE — 93005 ELECTROCARDIOGRAM TRACING: CPT | Performed by: EMERGENCY MEDICINE

## 2025-01-14 PROCEDURE — 80048 BASIC METABOLIC PNL TOTAL CA: CPT

## 2025-01-14 PROCEDURE — 83880 ASSAY OF NATRIURETIC PEPTIDE: CPT

## 2025-01-14 PROCEDURE — 84100 ASSAY OF PHOSPHORUS: CPT

## 2025-01-14 PROCEDURE — 94761 N-INVAS EAR/PLS OXIMETRY MLT: CPT

## 2025-01-14 PROCEDURE — 87636 SARSCOV2 & INF A&B AMP PRB: CPT

## 2025-01-14 PROCEDURE — 84484 ASSAY OF TROPONIN QUANT: CPT

## 2025-01-14 RX ORDER — OSELTAMIVIR PHOSPHATE 30 MG/1
30 CAPSULE ORAL EVERY OTHER DAY
Qty: 2 CAPSULE | Refills: 0 | Status: SHIPPED | OUTPATIENT
Start: 2025-01-14 | End: 2025-01-18

## 2025-01-14 RX ORDER — OSELTAMIVIR PHOSPHATE 30 MG/1
30 CAPSULE ORAL ONCE
Status: COMPLETED | OUTPATIENT
Start: 2025-01-14 | End: 2025-01-14

## 2025-01-14 RX ORDER — DOXYCYCLINE HYCLATE 100 MG
100 TABLET ORAL 2 TIMES DAILY
Qty: 20 TABLET | Refills: 0 | Status: SHIPPED | OUTPATIENT
Start: 2025-01-14 | End: 2025-01-24

## 2025-01-14 RX ADMIN — OSELTAMIVIR PHOSPHATE 30 MG: 30 CAPSULE ORAL at 14:23

## 2025-01-14 ASSESSMENT — PAIN DESCRIPTION - DESCRIPTORS: DESCRIPTORS: ACHING

## 2025-01-14 ASSESSMENT — PAIN DESCRIPTION - LOCATION: LOCATION: FOOT

## 2025-01-14 ASSESSMENT — PAIN DESCRIPTION - ORIENTATION: ORIENTATION: RIGHT

## 2025-01-14 ASSESSMENT — PAIN SCALES - GENERAL: PAINLEVEL_OUTOF10: 1

## 2025-01-14 NOTE — ED PROVIDER NOTES
EMERGENCY DEPARTMENT HISTORY AND PHYSICAL EXAM      Patient Name: Teodoro Nava  MRN: [id number]  YOB: 1965  Provider: Vianney Hatch MD  PCP: Zi Alcala MD   Time/Date of evaluation: 11:32 AM EST on 1/14/25    History of Presenting Illness     Chief Complaint   Patient presents with    Dizziness       History Provided By: EMS and Patient     History (Narative):   Teodoro Nava is a 59 y.o. male with a PMHX of bipolar disorder, anemia of chronic disease, hyperlipidemia, end-stage renal disease on dialysis Tuesday, Thursday, Saturday, obstructive sleep apnea on CPAP, and peripheral vascular disease  who presents to the emergency department  by EMS C/O lightheadedness since Thursday.  The patient went to dialysis today and was noted to be hypotensive at dialysis before starting treatment.  The patient states that he is sleepy, feels off balance, and has a cut on his right foot.  He states that it took well for the bleeding to stop.    He denies any fevers or chills.  He does have a cough.  He states that he was recently diagnosed with bronchitis.    The patient's dialysis schedule is Tuesday, Thursday and Saturday, Dr. Zhao is his nephrologist.        Past History     Past Medical History:  Past Medical History:   Diagnosis Date    Allergic rhinitis, seasonal     Anemia in chronic renal disease 2023    Dr MARCELLA Ortiz    Bipolar disorder ([facility])     Dr. Arceo since 1984    Chronic hyponatremia     Colon adenoma     and diverticulosis 7/15 Dr Lopez    COVID-19 virus infection 09/2021    Degenerative arthritis of spine 01/2016    on t and l spine films SARTHAK    Dyslipidemia     calculated 10 yr risk score was 5.9% (4/14); 5.4% (11/14); 4.3% (5/15); 6.7% (5/16); 7.3% (11/16)    ED (erectile dysfunction) 08/10/2017    ESRD (end stage renal disease) ([facility]) 2007    CKD (2007) Dr. Sena; proteinuria (2011); Dr Garcia (2021) bx FSGS; HD 2024 on transplant list    ESRD on hemodialysis ([facility]) 08/2023

## 2025-01-14 NOTE — ED TRIAGE NOTES
BIB EMS from dialysis center. Pt has been feeling lightheaded since Thursday and per EMS, pt was hypotensive before dialysis tx.

## 2025-01-15 ENCOUNTER — APPOINTMENT (OUTPATIENT)
Facility: HOSPITAL | Age: 60
DRG: 152 | End: 2025-01-15
Payer: COMMERCIAL

## 2025-01-15 ENCOUNTER — HOSPITAL ENCOUNTER (INPATIENT)
Facility: HOSPITAL | Age: 60
LOS: 1 days | Discharge: HOME | DRG: 152 | End: 2025-01-15
Attending: EMERGENCY MEDICINE | Admitting: STUDENT IN AN ORGANIZED HEALTH CARE EDUCATION/TRAINING PROGRAM
Payer: COMMERCIAL

## 2025-01-15 ENCOUNTER — CARE COORDINATION (OUTPATIENT)
Facility: CLINIC | Age: 60
End: 2025-01-15

## 2025-01-15 VITALS
RESPIRATION RATE: 15 BRPM | BODY MASS INDEX: 30.66 KG/M2 | SYSTOLIC BLOOD PRESSURE: 175 MMHG | OXYGEN SATURATION: 99 % | HEART RATE: 98 BPM | HEIGHT: 69 IN | DIASTOLIC BLOOD PRESSURE: 88 MMHG | WEIGHT: 207 LBS | TEMPERATURE: 96.9 F

## 2025-01-15 DIAGNOSIS — Z99.2 ENCOUNTER FOR DIALYSIS (HCC): ICD-10-CM

## 2025-01-15 DIAGNOSIS — Z99.2 END STAGE RENAL DISEASE ON DIALYSIS (HCC): ICD-10-CM

## 2025-01-15 DIAGNOSIS — E87.5 HYPERKALEMIA: ICD-10-CM

## 2025-01-15 DIAGNOSIS — N18.6 END STAGE RENAL DISEASE ON DIALYSIS (HCC): ICD-10-CM

## 2025-01-15 DIAGNOSIS — J11.1 INFLUENZA: ICD-10-CM

## 2025-01-15 DIAGNOSIS — R53.1 GENERALIZED WEAKNESS: Primary | ICD-10-CM

## 2025-01-15 DIAGNOSIS — E16.2 HYPOGLYCEMIA: ICD-10-CM

## 2025-01-15 LAB
ALBUMIN SERPL-MCNC: 3.6 G/DL (ref 3.4–5)
ALBUMIN/GLOB SERPL: 1.3 (ref 0.8–1.7)
ALP SERPL-CCNC: 191 U/L (ref 45–117)
ALT SERPL-CCNC: 25 U/L (ref 16–61)
ANION GAP SERPL CALC-SCNC: 13 MMOL/L (ref 3–18)
APPEARANCE UR: CLEAR
AST SERPL-CCNC: 18 U/L (ref 10–38)
B PERT DNA SPEC QL NAA+PROBE: NOT DETECTED
BACTERIA URNS QL MICRO: NEGATIVE /HPF
BASOPHILS # BLD: 0.02 K/UL (ref 0–0.1)
BASOPHILS NFR BLD: 0.3 % (ref 0–2)
BILIRUB SERPL-MCNC: 0.7 MG/DL (ref 0.2–1)
BILIRUB UR QL: NEGATIVE
BORDETELLA PARAPERTUSSIS BY PCR: NOT DETECTED
BUN SERPL-MCNC: 92 MG/DL (ref 7–18)
BUN/CREAT SERPL: 8 (ref 12–20)
C PNEUM DNA SPEC QL NAA+PROBE: NOT DETECTED
CALCIUM SERPL-MCNC: 8.7 MG/DL (ref 8.5–10.1)
CHLORIDE SERPL-SCNC: 96 MMOL/L (ref 100–111)
CO2 SERPL-SCNC: 22 MMOL/L (ref 21–32)
COLOR UR: YELLOW
CREAT SERPL-MCNC: 11.8 MG/DL (ref 0.6–1.3)
DIFFERENTIAL METHOD BLD: ABNORMAL
EKG ATRIAL RATE: 71 BPM
EKG DIAGNOSIS: NORMAL
EKG P AXIS: 49 DEGREES
EKG P-R INTERVAL: 246 MS
EKG Q-T INTERVAL: 426 MS
EKG QRS DURATION: 140 MS
EKG QTC CALCULATION (BAZETT): 462 MS
EKG R AXIS: 38 DEGREES
EKG T AXIS: 36 DEGREES
EKG VENTRICULAR RATE: 71 BPM
EOSINOPHIL # BLD: 0.15 K/UL (ref 0–0.4)
EOSINOPHIL NFR BLD: 2.4 % (ref 0–5)
EPITH CASTS URNS QL MICRO: NEGATIVE /LPF (ref 0–5)
ERYTHROCYTE [DISTWIDTH] IN BLOOD BY AUTOMATED COUNT: 15.9 % (ref 11.6–14.5)
FLUAV H3 RNA SPEC QL NAA+PROBE: DETECTED
FLUBV RNA SPEC QL NAA+PROBE: NOT DETECTED
GLOBULIN SER CALC-MCNC: 2.7 G/DL (ref 2–4)
GLUCOSE BLD STRIP.AUTO-MCNC: 68 MG/DL (ref 70–110)
GLUCOSE BLD STRIP.AUTO-MCNC: 94 MG/DL (ref 70–110)
GLUCOSE BLD STRIP.AUTO-MCNC: 96 MG/DL (ref 70–110)
GLUCOSE BLD STRIP.AUTO-MCNC: 99 MG/DL (ref 70–110)
GLUCOSE SERPL-MCNC: 97 MG/DL (ref 74–99)
GLUCOSE UR STRIP.AUTO-MCNC: NEGATIVE MG/DL
HADV DNA SPEC QL NAA+PROBE: NOT DETECTED
HCOV 229E RNA SPEC QL NAA+PROBE: NOT DETECTED
HCOV HKU1 RNA SPEC QL NAA+PROBE: NOT DETECTED
HCOV NL63 RNA SPEC QL NAA+PROBE: NOT DETECTED
HCOV OC43 RNA SPEC QL NAA+PROBE: NOT DETECTED
HCT VFR BLD AUTO: 30.3 % (ref 36–48)
HGB BLD-MCNC: 9.9 G/DL (ref 13–16)
HGB UR QL STRIP: ABNORMAL
HMPV RNA SPEC QL NAA+PROBE: NOT DETECTED
HPIV1 RNA SPEC QL NAA+PROBE: NOT DETECTED
HPIV2 RNA SPEC QL NAA+PROBE: NOT DETECTED
HPIV3 RNA SPEC QL NAA+PROBE: NOT DETECTED
HPIV4 RNA SPEC QL NAA+PROBE: NOT DETECTED
IMM GRANULOCYTES # BLD AUTO: 0.08 K/UL (ref 0–0.04)
IMM GRANULOCYTES NFR BLD AUTO: 1.3 % (ref 0–0.5)
KETONES UR QL STRIP.AUTO: NEGATIVE MG/DL
LACTATE BLD-SCNC: 0.84 MMOL/L (ref 0.4–2)
LEUKOCYTE ESTERASE UR QL STRIP.AUTO: NEGATIVE
LYMPHOCYTES # BLD: 0.77 K/UL (ref 0.9–3.6)
LYMPHOCYTES NFR BLD: 12.3 % (ref 21–52)
M PNEUMO DNA SPEC QL NAA+PROBE: NOT DETECTED
MCH RBC QN AUTO: 30.5 PG (ref 24–34)
MCHC RBC AUTO-ENTMCNC: 32.7 G/DL (ref 31–37)
MCV RBC AUTO: 93.2 FL (ref 78–100)
MONOCYTES # BLD: 0.56 K/UL (ref 0.05–1.2)
MONOCYTES NFR BLD: 8.9 % (ref 3–10)
NEUTS SEG # BLD: 4.69 K/UL (ref 1.8–8)
NEUTS SEG NFR BLD: 74.8 % (ref 40–73)
NITRITE UR QL STRIP.AUTO: NEGATIVE
NRBC # BLD: 0 K/UL (ref 0–0.01)
NRBC BLD-RTO: 0 PER 100 WBC
PH UR STRIP: 7 (ref 5–8)
PLATELET # BLD AUTO: 245 K/UL (ref 135–420)
PMV BLD AUTO: 9.8 FL (ref 9.2–11.8)
POTASSIUM SERPL-SCNC: 5.8 MMOL/L (ref 3.5–5.5)
POTASSIUM SERPL-SCNC: 6 MMOL/L (ref 3.5–5.5)
PROCALCITONIN SERPL-MCNC: 0.32 NG/ML
PROT SERPL-MCNC: 6.3 G/DL (ref 6.4–8.2)
PROT UR STRIP-MCNC: 300 MG/DL
RBC # BLD AUTO: 3.25 M/UL (ref 4.35–5.65)
RBC #/AREA URNS HPF: NORMAL /HPF (ref 0–5)
RSV RNA SPEC QL NAA+PROBE: NOT DETECTED
RV+EV RNA SPEC QL NAA+PROBE: NOT DETECTED
SARS-COV-2 RNA RESP QL NAA+PROBE: NOT DETECTED
SODIUM SERPL-SCNC: 131 MMOL/L (ref 136–145)
SP GR UR REFRACTOMETRY: 1.01 (ref 1–1.03)
TROPONIN I SERPL HS-MCNC: 23 NG/L (ref 0–78)
UROBILINOGEN UR QL STRIP.AUTO: 0.2 EU/DL (ref 0.2–1)
WBC # BLD AUTO: 6.3 K/UL (ref 4.6–13.2)
WBC URNS QL MICRO: NEGATIVE /HPF (ref 0–4)

## 2025-01-15 PROCEDURE — 84145 PROCALCITONIN (PCT): CPT

## 2025-01-15 PROCEDURE — 99285 EMERGENCY DEPT VISIT HI MDM: CPT

## 2025-01-15 PROCEDURE — 6360000002 HC RX W HCPCS: Performed by: EMERGENCY MEDICINE

## 2025-01-15 PROCEDURE — 83605 ASSAY OF LACTIC ACID: CPT

## 2025-01-15 PROCEDURE — 5A1D70Z PERFORMANCE OF URINARY FILTRATION, INTERMITTENT, LESS THAN 6 HOURS PER DAY: ICD-10-PCS | Performed by: STUDENT IN AN ORGANIZED HEALTH CARE EDUCATION/TRAINING PROGRAM

## 2025-01-15 PROCEDURE — 0202U NFCT DS 22 TRGT SARS-COV-2: CPT

## 2025-01-15 PROCEDURE — 96365 THER/PROPH/DIAG IV INF INIT: CPT

## 2025-01-15 PROCEDURE — 85025 COMPLETE CBC W/AUTO DIFF WBC: CPT

## 2025-01-15 PROCEDURE — 82962 GLUCOSE BLOOD TEST: CPT

## 2025-01-15 PROCEDURE — 6370000000 HC RX 637 (ALT 250 FOR IP): Performed by: EMERGENCY MEDICINE

## 2025-01-15 PROCEDURE — 84132 ASSAY OF SERUM POTASSIUM: CPT

## 2025-01-15 PROCEDURE — 94640 AIRWAY INHALATION TREATMENT: CPT

## 2025-01-15 PROCEDURE — 2500000003 HC RX 250 WO HCPCS: Performed by: EMERGENCY MEDICINE

## 2025-01-15 PROCEDURE — 1100000000 HC RM PRIVATE

## 2025-01-15 PROCEDURE — 71045 X-RAY EXAM CHEST 1 VIEW: CPT

## 2025-01-15 PROCEDURE — 94761 N-INVAS EAR/PLS OXIMETRY MLT: CPT

## 2025-01-15 PROCEDURE — 90935 HEMODIALYSIS ONE EVALUATION: CPT

## 2025-01-15 PROCEDURE — 84484 ASSAY OF TROPONIN QUANT: CPT

## 2025-01-15 PROCEDURE — 80053 COMPREHEN METABOLIC PANEL: CPT

## 2025-01-15 PROCEDURE — 96375 TX/PRO/DX INJ NEW DRUG ADDON: CPT

## 2025-01-15 PROCEDURE — 96366 THER/PROPH/DIAG IV INF ADDON: CPT

## 2025-01-15 PROCEDURE — 2580000003 HC RX 258: Performed by: EMERGENCY MEDICINE

## 2025-01-15 PROCEDURE — 93005 ELECTROCARDIOGRAM TRACING: CPT | Performed by: EMERGENCY MEDICINE

## 2025-01-15 PROCEDURE — 87040 BLOOD CULTURE FOR BACTERIA: CPT

## 2025-01-15 PROCEDURE — 81001 URINALYSIS AUTO W/SCOPE: CPT

## 2025-01-15 PROCEDURE — 87086 URINE CULTURE/COLONY COUNT: CPT

## 2025-01-15 PROCEDURE — 93010 ELECTROCARDIOGRAM REPORT: CPT | Performed by: INTERNAL MEDICINE

## 2025-01-15 PROCEDURE — 96368 THER/DIAG CONCURRENT INF: CPT

## 2025-01-15 RX ORDER — DEXTROSE MONOHYDRATE 100 MG/ML
INJECTION, SOLUTION INTRAVENOUS CONTINUOUS PRN
Status: DISCONTINUED | OUTPATIENT
Start: 2025-01-15 | End: 2025-01-15 | Stop reason: HOSPADM

## 2025-01-15 RX ORDER — CALCIUM GLUCONATE 20 MG/ML
1000 INJECTION, SOLUTION INTRAVENOUS ONCE
Status: COMPLETED | OUTPATIENT
Start: 2025-01-15 | End: 2025-01-15

## 2025-01-15 RX ORDER — FUROSEMIDE 10 MG/ML
40 INJECTION INTRAMUSCULAR; INTRAVENOUS ONCE
Status: COMPLETED | OUTPATIENT
Start: 2025-01-15 | End: 2025-01-15

## 2025-01-15 RX ORDER — ALBUTEROL SULFATE 0.83 MG/ML
15 SOLUTION RESPIRATORY (INHALATION) ONCE
Status: COMPLETED | OUTPATIENT
Start: 2025-01-15 | End: 2025-01-15

## 2025-01-15 RX ADMIN — ALBUTEROL SULFATE 15 MG: 2.5 SOLUTION RESPIRATORY (INHALATION) at 11:49

## 2025-01-15 RX ADMIN — SODIUM BICARBONATE 50 MEQ: 84 INJECTION INTRAVENOUS at 11:52

## 2025-01-15 RX ADMIN — DEXTROSE MONOHYDRATE 250 ML: 100 INJECTION, SOLUTION INTRAVENOUS at 12:07

## 2025-01-15 RX ADMIN — INSULIN HUMAN 10 UNITS: 100 INJECTION, SOLUTION PARENTERAL at 11:54

## 2025-01-15 RX ADMIN — CALCIUM GLUCONATE 1000 MG: 20 INJECTION, SOLUTION INTRAVENOUS at 11:52

## 2025-01-15 RX ADMIN — FUROSEMIDE 40 MG: 10 INJECTION, SOLUTION INTRAMUSCULAR; INTRAVENOUS at 11:52

## 2025-01-15 RX ADMIN — SODIUM ZIRCONIUM CYCLOSILICATE 10 G: 10 POWDER, FOR SUSPENSION ORAL at 11:52

## 2025-01-15 ASSESSMENT — PAIN - FUNCTIONAL ASSESSMENT: PAIN_FUNCTIONAL_ASSESSMENT: NONE - DENIES PAIN

## 2025-01-15 NOTE — PLAN OF CARE
HEMODIALYSIS Plan:  Time: 3 hrs  Dialysate: 2 K+  2.5Ca++  Bath  Net UF: 1-15 L As tolerated  Access: Aseptic care for PAULETTE AVF  Hemodynamic stability: Maintain BP WNL  Problem: Chronic Conditions and Co-morbidities  Goal: Patient's chronic conditions and co-morbidity symptoms are monitored and maintained or improved  Outcome: Progressing

## 2025-01-15 NOTE — ED PROVIDER NOTES
(2004); ETT neg (2009); NST (11/23) low risk, ef 64%, no tid    H/O echocardiogram     (11/23) ef 51%, LVH, RAYNE, nl valves, RVSP 20-25    Hypertension     IFG (impaired fasting glucose) 11/23/2015    Lumbar radiculitis 2020    on emg Dr Goldberg    Myofascial pain 2016    Dr Shanks    Obesity     IF 11/18 start weight 222 lbs    LALITO on CPAP     Dr Newman-currently not using per pt    PAD (peripheral artery disease) ([facility]) 06/2024    RIGHT post tibial>75, LEFT distal tib 50-75%, LEFT peroneal>75 (6/24)    Subacromial bursitis     right Dr. Duvall       Past Surgical History:  Past Surgical History:   Procedure Laterality Date    COLONOSCOPY      Dr Lopez (7/15) adenoma and divertics; Dr Subramanian (2/25/21) divertics and adenomas    COLONOSCOPY N/A 05/01/2024    Dr Maya divertics, roids, polyps - 3yr    CT BIOPSY RENAL  10/25/2021    CT BIOPSY RENAL 10/25/2021 MI RAD CT    DIALYSIS FISTULA CREATION Left 05/15/2023    LEFT ARM ARTERIOVENOUS FISTULA CREATION performed by Alfonso Estevez MD at [facility]       Family History:  Family History   Problem Relation Age of Onset    Diabetes Father     Elevated Lipids Father     Cancer Mother         breast       Social History:  Social History     Tobacco Use    Smoking status: Never     Passive exposure: Never    Smokeless tobacco: Never   Vaping Use    Vaping status: Never Used   Substance Use Topics    Alcohol use: Yes     Comment: occ    Drug use: No       Medications:  Current Facility-Administered Medications   Medication Dose Route Frequency Provider Last Rate Last Admin    calcium gluconate 1,000 mg in sodium chloride 50 mL  1,000 mg IntraVENous Once Vianney Hatch MD        insulin regular (HumuLIN R;NovoLIN R) injection 10 Units  10 Units IntraVENous Once Vianney Hatch MD        And    dextrose bolus 10% 250 mL  250 mL IntraVENous Once Vianney Hatch MD        glucose chewable tablet 16 g  4 tablet Oral PRN Vianney Hatch MD        dextrose bolus 10%

## 2025-01-15 NOTE — CARE COORDINATION
CTN is planning to contact Patient for follow up and relay Dr. Alcala message about prednisone. CTN noted Patient is currently presented to [facility] - ED. Will follow upon discharge.

## 2025-01-15 NOTE — ED TRIAGE NOTES
Pt brought into the ED with c/o \"not being able to get up\". Pt states that he's having muscle weakness. Pt was discharged from hospital yesterday with lac to R foot. Pts foot bandaged and small boot. A&Ox4. Dialysis pt TTS. +FLU

## 2025-01-15 NOTE — PROGRESS NOTES
HD Care plan  Time: 3 hrs  Dialysate:  2 K 2.5    Ca  Bath  Net UF:1.5L  Access: Aseptically care for L Upper AVF  Hemodynamic stability: Maintain BP WNL     Pre Dialysis:  Pt received from Unit Nurse MINDI Luna RN , pt came on a bed,A+O X 4, No s/s of distress noted  on RA .   L Upper AVF assessed positive bruit and thrill. Assessed 15 gauge needles x 2, cannulation successful, no abnormalities noted, no active bleeding observed from the insertion site at this time, line patent with good flow.  AVF accessed  per protocol without any difficulty     Intra Dialysis:  Tx initiated at 1448.    AVF flowing with ease.  For hemodynamic stability UF goal  set at 1500 ml as tolerated   Pt offered assistance with repositioning every 2 hours/prn    Vascular access visible  and line connections remained intact throughout entire duration of treatment.   Vital signs checked every 15 mins, WNL     Post Dialysis:  Tx completed at 1749,   Tolerated well ,1.5 L removed.  De-accessed per protocol.    Dialysis access patent, dry and intact.  Post Dialysis report given to unit  Nurse MINDI Luna RN    Assessment validated by JACK Kruse

## 2025-01-16 ENCOUNTER — CARE COORDINATION (OUTPATIENT)
Facility: CLINIC | Age: 60
End: 2025-01-16

## 2025-01-16 LAB
BACTERIA SPEC CULT: ABNORMAL
CC UR VC: ABNORMAL
SERVICE CMNT-IMP: ABNORMAL

## 2025-01-16 NOTE — CARE COORDINATION
Care Transitions Note    Initial Call - Call within 2 business days of discharge: Yes    Attempted to reach patient for transitions of care follow up and to inform of Dr. Alcala message about the prednisone. Unable to reach patient.    Noted Patient has PCP appt. Today 24 at 10 am.     Outreach Attempts:   HIPAA compliant voicemail left for patient.     Patient: Teodoro Nava    Patient : 1965   MRN: [id number]    Reason for Admission: Generalized   Discharge Date: 1/15/25  RURS: Readmission Risk Score: 18.8    Last Discharge Facility       Date Complaint Diagnosis Description Type Department Provider    1/15/25 Fatigue Generalized weakness ... ED to Hosp-Admission (Discharged) (DISCHARGE) [facility] Vianney Hatch MD; Hitesh Caro...            Was this an external facility discharge? No    Follow Up Appointment:   Patient has hospital follow up appointment scheduled within 7 days of discharge.    Future Appointments         Provider Specialty Dept Phone    2025 10:00 AM Zi Alcala MD Internal Medicine [phone]    2025 8:00 AM [facility] LAB VISIT Internal Medicine [phone]    2025 8:00 AM Zi Alcala MD Internal Medicine [phone]            Plan for follow-up on next business day.      Tray Christensen RN

## 2025-01-16 NOTE — ED NOTES
Attempted to ambulate pt. Pt unable to ambulate- states he feels fatigue and unable to move. Extremely shaky. Placed back in bed. VS obtained

## 2025-01-16 NOTE — ED NOTES
Ambulated with this RN- states he feel good. Given dinner tray. MD aware. Pt does not want to be admitted.

## 2025-01-17 ENCOUNTER — CARE COORDINATION (OUTPATIENT)
Facility: CLINIC | Age: 60
End: 2025-01-17

## 2025-01-17 NOTE — CARE COORDINATION
Care Transitions Note    Initial Call - Call within 2 business days of discharge: Yes    Attempted to reach patient for transitions of care follow up. Unable to reach patient.    Outreach Attempts:   Multiple attempts to contact patient, parent, (PHI form verified; on file) at phone numbers on file.     Patient: Teodoro Nava    Patient : 1965   MRN: [id number]    Reason for Admission: Generalized weakness    Discharge Date: 1/15/25  RURS: Readmission Risk Score: 18.8    Last Discharge Facility       Date Complaint Diagnosis Description Type Department Provider    1/15/25 Fatigue Generalized weakness ... ED to Hosp-Admission (Discharged) (DISCHARGE) [facility] Vianney Hatch MD; Hitesh Caro...            Future Appointments         Provider Specialty Dept Phone    2025 8:00 AM [facility] LAB VISIT Internal Medicine [phone]    2025 8:00 AM Zi Alcala MD Internal Medicine [phone]            No further follow-up call indicated unable to reach Patient.     Tray Christensen RN

## 2025-01-19 LAB
BACTERIA SPEC CULT: NORMAL
BACTERIA SPEC CULT: NORMAL
SERVICE CMNT-IMP: NORMAL
SERVICE CMNT-IMP: NORMAL

## 2025-01-22 ENCOUNTER — TELEPHONE (OUTPATIENT)
Facility: CLINIC | Age: 60
End: 2025-01-22

## 2025-01-22 NOTE — TELEPHONE ENCOUNTER
Patient contacted the office, he was recently seen at urgent care for the flu and was prescribed Tamiflu. He states that he had 6 pills left but has misplaced them and is asking if there is anyway Dr Alcala can call that in for him. Please advise       [facility]/pharmacy #[id number] - [place], [place] - [address]

## 2025-01-23 NOTE — TELEPHONE ENCOUNTER
Patient returned call to clinic, stated he thought he was given 10 capsules of OSELTAMIVIR (TAMIFLU) 30 MG CAPSULE but according to discharge from [facility] patient was only dispensed 2 capsules. Patient states took all medication and is now feeling better.    No further action required.

## 2025-01-29 ENCOUNTER — OFFICE VISIT (OUTPATIENT)
Facility: CLINIC | Age: 60
End: 2025-01-29
Payer: COMMERCIAL

## 2025-01-29 VITALS
OXYGEN SATURATION: 91 % | HEIGHT: 69 IN | WEIGHT: 217 LBS | SYSTOLIC BLOOD PRESSURE: 136 MMHG | TEMPERATURE: 98.7 F | DIASTOLIC BLOOD PRESSURE: 80 MMHG | RESPIRATION RATE: 16 BRPM | BODY MASS INDEX: 32.14 KG/M2 | HEART RATE: 78 BPM

## 2025-01-29 DIAGNOSIS — R26.9 GAIT ABNORMALITY: Primary | ICD-10-CM

## 2025-01-29 DIAGNOSIS — G62.9 NEUROPATHY: ICD-10-CM

## 2025-01-29 PROCEDURE — 3079F DIAST BP 80-89 MM HG: CPT | Performed by: INTERNAL MEDICINE

## 2025-01-29 PROCEDURE — 1111F DSCHRG MED/CURRENT MED MERGE: CPT | Performed by: INTERNAL MEDICINE

## 2025-01-29 PROCEDURE — 99215 OFFICE O/P EST HI 40 MIN: CPT | Performed by: INTERNAL MEDICINE

## 2025-01-29 PROCEDURE — G8427 DOCREV CUR MEDS BY ELIG CLIN: HCPCS | Performed by: INTERNAL MEDICINE

## 2025-01-29 PROCEDURE — G8417 CALC BMI ABV UP PARAM F/U: HCPCS | Performed by: INTERNAL MEDICINE

## 2025-01-29 PROCEDURE — 3075F SYST BP GE 130 - 139MM HG: CPT | Performed by: INTERNAL MEDICINE

## 2025-01-29 PROCEDURE — 3017F COLORECTAL CA SCREEN DOC REV: CPT | Performed by: INTERNAL MEDICINE

## 2025-01-29 PROCEDURE — 1036F TOBACCO NON-USER: CPT | Performed by: INTERNAL MEDICINE

## 2025-01-29 SDOH — ECONOMIC STABILITY: FOOD INSECURITY: WITHIN THE PAST 12 MONTHS, THE FOOD YOU BOUGHT JUST DIDN'T LAST AND YOU DIDN'T HAVE MONEY TO GET MORE.: NEVER TRUE

## 2025-01-29 SDOH — ECONOMIC STABILITY: FOOD INSECURITY: WITHIN THE PAST 12 MONTHS, YOU WORRIED THAT YOUR FOOD WOULD RUN OUT BEFORE YOU GOT MONEY TO BUY MORE.: NEVER TRUE

## 2025-01-29 NOTE — PROGRESS NOTES
Patient being seen today for transitional care management.  Sister was present for the evaluation    Patient was admitted to [facility] from 1/5-9/24 and then had an ER visit 1/14/25               I thoroughly reviewed the discharge summary, notes, consults, labs and imaging studies in the electronic record.  Pertinent details are summarized below and the record has been updated to reflect recent events    He was admitted for c/o ataxia.  Seen by neuro/Dr Calvillo. CT head neg,  mri head nothing acute, MRA H&N showed mild stenosis V4.  MRI lumbar spine showed multilevel degen changes w mild to mod foraminal narrowing. Of note, he had been seeing Dr Gandara/claude prior to admission and was scheduled for emg 1/10/25.  He is suspected of having PN and was actually started on gabapentin.  He is asking for official outpt neuro consult along with PT consult for gait training and balance therapy.  He did get the emg done but has not been told the results yet.    Past Medical History:   Diagnosis Date    Allergic rhinitis, seasonal     Anemia in chronic renal disease 2023    Dr MARCELLA Ortiz    Bipolar disorder ([facility])     Dr. Arceo since 1984    Chronic hyponatremia     Colon adenoma     and diverticulosis 7/15 Dr John AVINA-19 virus infection 09/2021    Degenerative arthritis of lumbar spine 01/2025    mri [facility] showed Multilevel degenerative disc and degenerative facet disease superimposed upon a developmentally small spinal canal. Areas of central canal stenosis are mild.   Multilevel foraminal narrowing is present and appears to be of mild-to-moderate severity.    Degenerative arthritis of spine 01/2016    on t and l spine films SARTHAK    Dyslipidemia     calculated 10 yr risk score was 5.9% (4/14); 5.4% (11/14); 4.3% (5/15); 6.7% (5/16); 7.3% (11/16)    ED (erectile dysfunction) 08/10/2017    ESRD (end stage renal disease) ([facility]) 2007    CKD (2007) Dr. Sena; proteinuria (2011); Dr Garcia (2021) bx FSGS; HD T/T/S (2024) on transplant list

## 2025-01-29 NOTE — PROGRESS NOTES
Teodoro Nava presents today for   Chief Complaint   Patient presents with    Discuss Referral     Physical therapy    Follow-up     Accompanied by sister/Jackelyn Mukherjee    \"Have you been to the ER, urgent care clinic since your last visit?  Hospitalized since your last visit?\"    YES - When: approximately 2  weeks ago.  Where and Why: [facility] ED, generalized weakness.    “Have you seen or consulted any other health care providers outside of [facility] since your last visit?”    NO

## 2025-02-05 ENCOUNTER — TELEPHONE (OUTPATIENT)
Facility: CLINIC | Age: 60
End: 2025-02-05

## 2025-02-05 NOTE — TELEPHONE ENCOUNTER
Patient needs a call back to discuss the safest way to come off his Gabapentin. This medication was prescribed by a different provider, however he's left messages with the other office and has yet to call a return call.

## 2025-02-06 NOTE — TELEPHONE ENCOUNTER
Called and spoke to patient, he states the Gabapentin was prescribed for the tingling in his feet and he has been taking the medication for about a month.    Please review and advise.

## 2025-02-06 NOTE — TELEPHONE ENCOUNTER
Called and spoke to patient, relayed message from Dr. Alcala; patient verbalized understanding.    No further action required.    no

## 2025-02-12 ENCOUNTER — TELEPHONE (OUTPATIENT)
Facility: CLINIC | Age: 60
End: 2025-02-12

## 2025-02-12 NOTE — TELEPHONE ENCOUNTER
Pt thinks that he's having a reaction to the drug gabapentin. Pt said that he's currently being winged off of this, which has been taken place for about a day or so and last night, he wasn't able to sleep, he can't concentrate, can't think, can barely talk, and his memory's bad. Pt would like advice on what to do and would like a follow up call. Please advise.

## 2025-02-26 ENCOUNTER — HOSPITAL ENCOUNTER (OUTPATIENT)
Facility: HOSPITAL | Age: 60
Setting detail: RECURRING SERIES
Discharge: HOME | End: 2025-03-01
Payer: MEDICARE

## 2025-02-26 PROCEDURE — 97161 PT EVAL LOW COMPLEX 20 MIN: CPT

## 2025-02-26 NOTE — PROGRESS NOTES
[facility] [address]  [address]  [place] [address]  [phone] fax    Plan of Care / Statement of Necessity for Physical Therapy Services     Patient Name: Teodoro Nava : 1965   Medical   Diagnosis: Other abnormalities of gait and mobility [R26.89] Treatment Diagnosis: R26.89  Abnormalities of gait and mobility      Onset Date: Referral date: 25 Payor :  Payor: MADDY REESE / Plan: ALVA [facility] FEDERAL / Product Type: *No Product type* /    Referral Source: Zi Alcala MD Start of Care (SOC): 2025   Prior Hospitalization: See medical history Provider #: [id number]   Prior Level of Function: Functionally independent   Comorbidities: Fitsula into left arm (10lbs with left arm restriction); ESRD, HTN, High cholesterol, Lumbar spine.         Subjective Info:     BARRY: States that most of his balance issues started around 6-8 weeks ago when he was prescribed Gabapentin. Recently, got off medication ~2 weeks.   Current Symptoms: Reports not having as much balance issues, decreased ability to walk prolonged distances secondary to fatigue. Weakness in bilateral LE resulting in impaired ability to negotiate stairs. Some difficulty with squats.   Current Current Pain: 0/10     Best Pain:  0/10     Worst Pain:  0/10  Progression since onset?: much better since getting off Gabapentin  Current Mobility: No AD  PLOF: Functionally independent  Home- Environment: 3 bed room, with 1 room in the garage; living with sister who is able to assist physically.   Past Med Hx/Surgical Hx: Fitsula into left arm (10lbs with left arm restriction); ESRD, HTN, High cholesterol, Lumbar spine.  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 male who presents to PT with c/o gait abnormalities which have resolved since

## 2025-02-26 NOTE — PROGRESS NOTES
Physical Therapy Discharge Instructions      [facility] [address]  [address]  [place] [address]  [phone] fax      Patient: Teodoro Nava  : 1965      Continue Home Exercise Program 5 times per day for 5 weeks, then decrease to 3 times per week      Continue with    [x] Ice  as needed 1-2 times per day     [x] Heat           Follow up with MD:     [] Upon completion of therapy     [x] As needed      Recommendations:     [x]   Return to activity with home program    []   Return to activity with the following modifications:       []Post Rehab Program    []Join Independent aquatic program     []Return to/join local gym        Additional Comments: Good luck with everything, & do your home exercises to keep up with the strength in your legs. If you ever need us again and update referral would be need from a physician.           Alexis Hicks, PT 2025 9:29 AM

## 2025-02-26 NOTE — PROGRESS NOTES
PHYSICAL / OCCUPATIONAL THERAPY - DAILY TREATMENT NOTE    Patient Name: Teodoro Nava    Date: 2025    : 1965  Insurance: Payor: MEDICARE / Plan: MEDICARE PART A AND B / Product Type: *No Product type* /      Patient  verified Yes     Visit #   Current / Total 1 1   Time   In / Out 850 930   Pain   In / Out 0 0   Subjective Functional Status/Changes: See POC     TREATMENT AREA =  Other abnormalities of gait and mobility [R26.89]      If an interpreting service is utilized for treatment of this patient, the contents of this document represent the material reviewed with the patient via the .     OBJECTIVE    40 min [x]Eval - untimed                             Therapeutic Procedures:    Tx Min Billable or 1:1 Min (if diff from Tx Min) Procedure, Rationale, Specifics          Details if applicable:              Details if applicable:            Details if applicable:            Details if applicable:            Details if applicable:       [facility] Totals Reminder: bill using total billable min of TIMED therapeutic procedures (example: do not include dry needle or estim unattended, both untimed codes, in totals to left)  8-22 min = 1 unit; 23-37 min = 2 units; 38-52 min = 3 units; 53-67 min = 4 units; 68-82 min = 5 units   Total Total     [x]  Patient Education billed concurrently with other procedures   [x] Review HEP    [] Progressed/Changed HEP, detail:    [] Other detail:       Objective Information/Functional Measures/Assessment    See POC    Patient will continue to benefit from skilled PT / OT services to modify and progress therapeutic interventions, analyze and address functional mobility deficits, analyze and address ROM deficits, analyze and address strength deficits, analyze and address soft tissue restrictions, analyze and cue for proper movement patterns, analyze and modify for postural abnormalities, analyze and address imbalance/dizziness, and instruct in home and community

## 2025-04-18 RX ORDER — TENAPANOR 31.9 MG/1
1 TABLET, FILM COATED ORAL 2 TIMES DAILY
COMMUNITY
Start: 2024-07-11 | End: 2025-04-23 | Stop reason: ALTCHOICE

## 2025-04-23 ENCOUNTER — OFFICE VISIT (OUTPATIENT)
Age: 60
End: 2025-04-23
Payer: MEDICARE

## 2025-04-23 VITALS
SYSTOLIC BLOOD PRESSURE: 126 MMHG | DIASTOLIC BLOOD PRESSURE: 70 MMHG | HEIGHT: 68 IN | WEIGHT: 214 LBS | OXYGEN SATURATION: 99 % | HEART RATE: 65 BPM | TEMPERATURE: 98 F | BODY MASS INDEX: 32.43 KG/M2

## 2025-04-23 DIAGNOSIS — G62.9 POLYNEUROPATHY: Primary | ICD-10-CM

## 2025-04-23 DIAGNOSIS — M47.816 LUMBAR SPONDYLOSIS: ICD-10-CM

## 2025-04-23 DIAGNOSIS — G21.19 DRUG-INDUCED PARKINSONISM: ICD-10-CM

## 2025-04-23 PROCEDURE — 3074F SYST BP LT 130 MM HG: CPT | Performed by: PSYCHIATRY & NEUROLOGY

## 2025-04-23 PROCEDURE — G8417 CALC BMI ABV UP PARAM F/U: HCPCS | Performed by: PSYCHIATRY & NEUROLOGY

## 2025-04-23 PROCEDURE — 1036F TOBACCO NON-USER: CPT | Performed by: PSYCHIATRY & NEUROLOGY

## 2025-04-23 PROCEDURE — G8427 DOCREV CUR MEDS BY ELIG CLIN: HCPCS | Performed by: PSYCHIATRY & NEUROLOGY

## 2025-04-23 PROCEDURE — 99204 OFFICE O/P NEW MOD 45 MIN: CPT | Performed by: PSYCHIATRY & NEUROLOGY

## 2025-04-23 PROCEDURE — G2211 COMPLEX E/M VISIT ADD ON: HCPCS | Performed by: PSYCHIATRY & NEUROLOGY

## 2025-04-23 PROCEDURE — 3017F COLORECTAL CA SCREEN DOC REV: CPT | Performed by: PSYCHIATRY & NEUROLOGY

## 2025-04-23 PROCEDURE — 3078F DIAST BP <80 MM HG: CPT | Performed by: PSYCHIATRY & NEUROLOGY

## 2025-04-23 NOTE — PROGRESS NOTES
SUBJECTIVE:   Teodoro Nava is a 59 y.o. male seen regarding the following:     Chief Complaint   Patient presents with    New Patient     Neuropathy        HPI:The patient has had numbness and tingling in the feet for several months. He was started on gabapentin but developed severe balance and gait difficulties. He was admitted to the hospital. Stroke evaluation was unrevealing. Gabapentin discontinued and balance improved. He was referred for physical therapy. He has ESRD on dialysis. The numbness and tingling is present. He doesn't have pain.  He does not have diabetes.The patient did have lower extremity arterial dopplers which showed some PVD and stenosis.he does have some low back pain. He has done chiropractic treatments which has helped.  Symptoms are manageable.  Patient with history of bipolar disorder under good control with oxcarbazepine/lamotrigine/zyprexa.  He has LALITO, was on CPAP, lost weight and felt symptom improved so he does not use the CPAP anymore.     Past Medical History, Past Surgical History, Family history, Social History, and Medications were all reviewed with the patient today and updated as necessary.     Current Outpatient Medications   Medication Sig Dispense Refill    atorvastatin (LIPITOR) 40 MG tablet TAKE 1 TABLET BY MOUTH EVERY DAY 90 tablet 3    blood glucose monitor strips Test 1 times a day & as needed for symptoms of irregular blood glucose. 100 strip 5    blood glucose monitor kit and supplies Use daily as directed and as needed for episodes of hypoglycemia 1 kit 0    Lancets MISC 1 each by Does not apply route daily Use daily and as needed for hypoglycemia 100 each 5    carvedilol (COREG) 12.5 MG tablet Take 1 tablet by mouth 2 times daily ceived the following from [facility]: Outside name: carvediloL (COREG) 6.25 mg tablet      Cholecalciferol 50 MCG (2000 UT) CAPS Take by mouth 2 tablet by mouth daily      lamoTRIgine (LAMICTAL) 100 MG tablet Take 2

## 2025-05-02 ENCOUNTER — ANESTHESIA EVENT (OUTPATIENT)
Dept: CARDIOTHORACIC SURGERY | Facility: HOSPITAL | Age: 60
End: 2025-05-02
Payer: MEDICARE

## 2025-05-05 ENCOUNTER — HOSPITAL ENCOUNTER (OUTPATIENT)
Facility: HOSPITAL | Age: 60
Setting detail: OUTPATIENT SURGERY
Discharge: HOME | End: 2025-05-05
Attending: SURGERY | Admitting: SURGERY
Payer: MEDICARE

## 2025-05-05 ENCOUNTER — APPOINTMENT (OUTPATIENT)
Facility: HOSPITAL | Age: 60
End: 2025-05-05
Attending: SURGERY
Payer: MEDICARE

## 2025-05-05 ENCOUNTER — ANESTHESIA (OUTPATIENT)
Dept: CARDIOTHORACIC SURGERY | Facility: HOSPITAL | Age: 60
End: 2025-05-05
Payer: MEDICARE

## 2025-05-05 VITALS
RESPIRATION RATE: 16 BRPM | DIASTOLIC BLOOD PRESSURE: 75 MMHG | WEIGHT: 231.6 LBS | HEIGHT: 69 IN | SYSTOLIC BLOOD PRESSURE: 135 MMHG | HEART RATE: 57 BPM | BODY MASS INDEX: 34.3 KG/M2 | OXYGEN SATURATION: 97 % | TEMPERATURE: 97 F

## 2025-05-05 DIAGNOSIS — Z99.2 ESRD ON HEMODIALYSIS (HCC): Primary | ICD-10-CM

## 2025-05-05 DIAGNOSIS — N18.6 ESRD ON HEMODIALYSIS (HCC): Primary | ICD-10-CM

## 2025-05-05 LAB
ANION GAP SERPL CALC-SCNC: 14 MMOL/L (ref 3–18)
BUN SERPL-MCNC: 45 MG/DL (ref 6–23)
BUN/CREAT SERPL: 6 (ref 12–20)
CALCIUM SERPL-MCNC: 9.3 MG/DL (ref 8.5–10.1)
CHLORIDE SERPL-SCNC: 90 MMOL/L (ref 98–107)
CO2 SERPL-SCNC: 28 MMOL/L (ref 21–32)
CREAT SERPL-MCNC: 7.59 MG/DL (ref 0.6–1.3)
GLUCOSE SERPL-MCNC: 98 MG/DL (ref 74–108)
POTASSIUM SERPL-SCNC: 5.6 MMOL/L (ref 3.5–5.5)
SODIUM SERPL-SCNC: 133 MMOL/L (ref 136–145)

## 2025-05-05 PROCEDURE — 7100000011 HC PHASE II RECOVERY - ADDTL 15 MIN: Performed by: SURGERY

## 2025-05-05 PROCEDURE — 80048 BASIC METABOLIC PNL TOTAL CA: CPT

## 2025-05-05 PROCEDURE — 6360000002 HC RX W HCPCS: Performed by: SURGERY

## 2025-05-05 PROCEDURE — 3700000000 HC ANESTHESIA ATTENDED CARE: Performed by: SURGERY

## 2025-05-05 PROCEDURE — C1768 GRAFT, VASCULAR: HCPCS | Performed by: SURGERY

## 2025-05-05 PROCEDURE — 3600000002 HC SURGERY LEVEL 2 BASE: Performed by: SURGERY

## 2025-05-05 PROCEDURE — 2580000003 HC RX 258: Performed by: NURSE ANESTHETIST, CERTIFIED REGISTERED

## 2025-05-05 PROCEDURE — 2500000003 HC RX 250 WO HCPCS: Performed by: NURSE ANESTHETIST, CERTIFIED REGISTERED

## 2025-05-05 PROCEDURE — 6360000002 HC RX W HCPCS: Performed by: NURSE ANESTHETIST, CERTIFIED REGISTERED

## 2025-05-05 PROCEDURE — 3600000012 HC SURGERY LEVEL 2 ADDTL 15MIN: Performed by: SURGERY

## 2025-05-05 PROCEDURE — 7100000010 HC PHASE II RECOVERY - FIRST 15 MIN: Performed by: SURGERY

## 2025-05-05 PROCEDURE — C1750 CATH, HEMODIALYSIS,LONG-TERM: HCPCS | Performed by: SURGERY

## 2025-05-05 PROCEDURE — 3700000001 HC ADD 15 MINUTES (ANESTHESIA): Performed by: SURGERY

## 2025-05-05 PROCEDURE — 71045 X-RAY EXAM CHEST 1 VIEW: CPT

## 2025-05-05 PROCEDURE — 2500000003 HC RX 250 WO HCPCS: Performed by: SURGERY

## 2025-05-05 PROCEDURE — C1894 INTRO/SHEATH, NON-LASER: HCPCS | Performed by: SURGERY

## 2025-05-05 PROCEDURE — 6370000000 HC RX 637 (ALT 250 FOR IP): Performed by: SURGERY

## 2025-05-05 PROCEDURE — 7100000001 HC PACU RECOVERY - ADDTL 15 MIN: Performed by: SURGERY

## 2025-05-05 PROCEDURE — 2709999900 HC NON-CHARGEABLE SUPPLY: Performed by: SURGERY

## 2025-05-05 PROCEDURE — 6370000000 HC RX 637 (ALT 250 FOR IP): Performed by: NURSE ANESTHETIST, CERTIFIED REGISTERED

## 2025-05-05 PROCEDURE — 7100000000 HC PACU RECOVERY - FIRST 15 MIN: Performed by: SURGERY

## 2025-05-05 DEVICE — GRAFT VASC L45CM DIA4-7MM PTFE CBAS HEP SURF STD WALLED: Type: IMPLANTABLE DEVICE | Site: ARM | Status: FUNCTIONAL

## 2025-05-05 RX ORDER — OXYCODONE HYDROCHLORIDE 5 MG/1
5 TABLET ORAL
Refills: 0 | Status: DISCONTINUED | OUTPATIENT
Start: 2025-05-05 | End: 2025-05-05 | Stop reason: HOSPADM

## 2025-05-05 RX ORDER — HEPARIN SODIUM 1000 [USP'U]/ML
INJECTION, SOLUTION INTRAVENOUS; SUBCUTANEOUS
Status: DISCONTINUED | OUTPATIENT
Start: 2025-05-05 | End: 2025-05-05 | Stop reason: SDUPTHER

## 2025-05-05 RX ORDER — PROPOFOL 10 MG/ML
INJECTION, EMULSION INTRAVENOUS
Status: DISCONTINUED | OUTPATIENT
Start: 2025-05-05 | End: 2025-05-05 | Stop reason: SDUPTHER

## 2025-05-05 RX ORDER — SODIUM CHLORIDE 9 MG/ML
INJECTION, SOLUTION INTRAVENOUS
Status: COMPLETED
Start: 2025-05-05 | End: 2025-05-05

## 2025-05-05 RX ORDER — SODIUM CHLORIDE 0.9 % (FLUSH) 0.9 %
5-40 SYRINGE (ML) INJECTION PRN
Status: DISCONTINUED | OUTPATIENT
Start: 2025-05-05 | End: 2025-05-05 | Stop reason: HOSPADM

## 2025-05-05 RX ORDER — SODIUM CHLORIDE 9 MG/ML
INJECTION, SOLUTION INTRAVENOUS PRN
Status: DISCONTINUED | OUTPATIENT
Start: 2025-05-05 | End: 2025-05-05 | Stop reason: HOSPADM

## 2025-05-05 RX ORDER — FENTANYL CITRATE 50 UG/ML
25 INJECTION, SOLUTION INTRAMUSCULAR; INTRAVENOUS EVERY 5 MIN PRN
Refills: 0 | Status: DISCONTINUED | OUTPATIENT
Start: 2025-05-05 | End: 2025-05-05 | Stop reason: HOSPADM

## 2025-05-05 RX ORDER — LIDOCAINE HYDROCHLORIDE 10 MG/ML
INJECTION, SOLUTION EPIDURAL; INFILTRATION; INTRACAUDAL; PERINEURAL PRN
Status: DISCONTINUED | OUTPATIENT
Start: 2025-05-05 | End: 2025-05-05 | Stop reason: ALTCHOICE

## 2025-05-05 RX ORDER — EPHEDRINE SULFATE/0.9% NACL/PF 25 MG/5 ML
SYRINGE (ML) INTRAVENOUS
Status: DISCONTINUED | OUTPATIENT
Start: 2025-05-05 | End: 2025-05-05 | Stop reason: SDUPTHER

## 2025-05-05 RX ORDER — PROMETHAZINE HYDROCHLORIDE 12.5 MG/1
12.5 TABLET ORAL ONCE
Status: DISCONTINUED | OUTPATIENT
Start: 2025-05-05 | End: 2025-05-05

## 2025-05-05 RX ORDER — MAGNESIUM HYDROXIDE 1200 MG/15ML
LIQUID ORAL CONTINUOUS PRN
Status: COMPLETED | OUTPATIENT
Start: 2025-05-05 | End: 2025-05-05

## 2025-05-05 RX ORDER — ONDANSETRON 2 MG/ML
4 INJECTION INTRAMUSCULAR; INTRAVENOUS
Status: DISCONTINUED | OUTPATIENT
Start: 2025-05-05 | End: 2025-05-05 | Stop reason: HOSPADM

## 2025-05-05 RX ORDER — ONDANSETRON 2 MG/ML
INJECTION INTRAMUSCULAR; INTRAVENOUS
Status: DISCONTINUED | OUTPATIENT
Start: 2025-05-05 | End: 2025-05-05 | Stop reason: SDUPTHER

## 2025-05-05 RX ORDER — SODIUM CHLORIDE 0.9 % (FLUSH) 0.9 %
5-40 SYRINGE (ML) INJECTION EVERY 12 HOURS SCHEDULED
Status: DISCONTINUED | OUTPATIENT
Start: 2025-05-05 | End: 2025-05-05 | Stop reason: HOSPADM

## 2025-05-05 RX ORDER — LIDOCAINE HYDROCHLORIDE 20 MG/ML
INJECTION, SOLUTION EPIDURAL; INFILTRATION; INTRACAUDAL; PERINEURAL
Status: DISCONTINUED | OUTPATIENT
Start: 2025-05-05 | End: 2025-05-05 | Stop reason: SDUPTHER

## 2025-05-05 RX ORDER — DEXAMETHASONE SODIUM PHOSPHATE 4 MG/ML
INJECTION, SOLUTION INTRA-ARTICULAR; INTRALESIONAL; INTRAMUSCULAR; INTRAVENOUS; SOFT TISSUE
Status: DISCONTINUED | OUTPATIENT
Start: 2025-05-05 | End: 2025-05-05 | Stop reason: SDUPTHER

## 2025-05-05 RX ORDER — HEPARIN SODIUM 5000 [USP'U]/ML
INJECTION, SOLUTION INTRAVENOUS; SUBCUTANEOUS PRN
Status: DISCONTINUED | OUTPATIENT
Start: 2025-05-05 | End: 2025-05-05 | Stop reason: ALTCHOICE

## 2025-05-05 RX ORDER — FAMOTIDINE 20 MG/1
20 TABLET, FILM COATED ORAL ONCE
Status: COMPLETED | OUTPATIENT
Start: 2025-05-05 | End: 2025-05-05

## 2025-05-05 RX ORDER — LIDOCAINE HYDROCHLORIDE 10 MG/ML
1 INJECTION, SOLUTION EPIDURAL; INFILTRATION; INTRACAUDAL; PERINEURAL
Status: DISCONTINUED | OUTPATIENT
Start: 2025-05-05 | End: 2025-05-05 | Stop reason: HOSPADM

## 2025-05-05 RX ORDER — HEPARIN SODIUM 1000 [USP'U]/ML
INJECTION, SOLUTION INTRAVENOUS; SUBCUTANEOUS
Status: DISCONTINUED
Start: 2025-05-05 | End: 2025-05-05 | Stop reason: HOSPADM

## 2025-05-05 RX ORDER — HEPARIN SODIUM 5000 [USP'U]/ML
INJECTION, SOLUTION INTRAVENOUS; SUBCUTANEOUS
Status: DISCONTINUED
Start: 2025-05-05 | End: 2025-05-05 | Stop reason: HOSPADM

## 2025-05-05 RX ORDER — NALOXONE HYDROCHLORIDE 0.4 MG/ML
INJECTION, SOLUTION INTRAMUSCULAR; INTRAVENOUS; SUBCUTANEOUS PRN
Status: DISCONTINUED | OUTPATIENT
Start: 2025-05-05 | End: 2025-05-05 | Stop reason: HOSPADM

## 2025-05-05 RX ORDER — HEPARIN SODIUM 200 [USP'U]/100ML
INJECTION, SOLUTION INTRAVENOUS CONTINUOUS PRN
Status: COMPLETED | OUTPATIENT
Start: 2025-05-05 | End: 2025-05-05

## 2025-05-05 RX ORDER — ACETAMINOPHEN 325 MG/1
650 TABLET ORAL
Status: DISCONTINUED | OUTPATIENT
Start: 2025-05-05 | End: 2025-05-05 | Stop reason: HOSPADM

## 2025-05-05 RX ORDER — IODIXANOL 320 MG/ML
INJECTION, SOLUTION INTRAVASCULAR
Status: DISCONTINUED
Start: 2025-05-05 | End: 2025-05-05 | Stop reason: HOSPADM

## 2025-05-05 RX ORDER — HYDROCODONE BITARTRATE AND ACETAMINOPHEN 5; 325 MG/1; MG/1
1 TABLET ORAL ONCE
Refills: 0 | Status: COMPLETED | OUTPATIENT
Start: 2025-05-05 | End: 2025-05-05

## 2025-05-05 RX ORDER — HYDROCODONE BITARTRATE AND ACETAMINOPHEN 5; 325 MG/1; MG/1
1 TABLET ORAL EVERY 4 HOURS PRN
Qty: 42 TABLET | Refills: 0 | Status: SHIPPED | OUTPATIENT
Start: 2025-05-05 | End: 2025-05-12

## 2025-05-05 RX ORDER — SEVELAMER CARBONATE 800 MG/1
1 TABLET, FILM COATED ORAL
COMMUNITY

## 2025-05-05 RX ORDER — FENTANYL CITRATE 50 UG/ML
INJECTION, SOLUTION INTRAMUSCULAR; INTRAVENOUS
Status: DISCONTINUED | OUTPATIENT
Start: 2025-05-05 | End: 2025-05-05 | Stop reason: SDUPTHER

## 2025-05-05 RX ORDER — PHENYLEPHRINE HCL IN 0.9% NACL 1 MG/10 ML
SYRINGE (ML) INTRAVENOUS
Status: DISCONTINUED | OUTPATIENT
Start: 2025-05-05 | End: 2025-05-05 | Stop reason: SDUPTHER

## 2025-05-05 RX ADMIN — HYDROCODONE BITARTRATE AND ACETAMINOPHEN 1 TABLET: 5; 325 TABLET ORAL at 16:02

## 2025-05-05 RX ADMIN — FENTANYL CITRATE 25 MCG: 50 INJECTION INTRAMUSCULAR; INTRAVENOUS at 12:30

## 2025-05-05 RX ADMIN — FENTANYL CITRATE 25 MCG: 50 INJECTION INTRAMUSCULAR; INTRAVENOUS at 12:03

## 2025-05-05 RX ADMIN — SODIUM CHLORIDE: 0.9 INJECTION, SOLUTION INTRAVENOUS at 09:29

## 2025-05-05 RX ADMIN — HEPARIN SODIUM 3500 UNITS: 1000 INJECTION, SOLUTION INTRAVENOUS; SUBCUTANEOUS at 12:46

## 2025-05-05 RX ADMIN — DEXAMETHASONE SODIUM PHOSPHATE 4 MG: 4 INJECTION, SOLUTION INTRAMUSCULAR; INTRAVENOUS at 12:30

## 2025-05-05 RX ADMIN — WATER 2000 MG: 1 INJECTION INTRAMUSCULAR; INTRAVENOUS; SUBCUTANEOUS at 12:05

## 2025-05-05 RX ADMIN — ONDANSETRON 4 MG: 2 INJECTION INTRAMUSCULAR; INTRAVENOUS at 12:30

## 2025-05-05 RX ADMIN — FENTANYL CITRATE 25 MCG: 50 INJECTION INTRAMUSCULAR; INTRAVENOUS at 13:15

## 2025-05-05 RX ADMIN — FENTANYL CITRATE 25 MCG: 50 INJECTION INTRAMUSCULAR; INTRAVENOUS at 13:27

## 2025-05-05 RX ADMIN — Medication 100 MCG: at 12:13

## 2025-05-05 RX ADMIN — FAMOTIDINE 20 MG: 20 TABLET, FILM COATED ORAL at 09:31

## 2025-05-05 RX ADMIN — EPHEDRINE SULFATE 5 MG: 5 INJECTION INTRAVENOUS at 12:14

## 2025-05-05 RX ADMIN — EPHEDRINE SULFATE 5 MG: 5 INJECTION INTRAVENOUS at 12:25

## 2025-05-05 RX ADMIN — PROPOFOL 200 MG: 10 INJECTION, EMULSION INTRAVENOUS at 11:49

## 2025-05-05 RX ADMIN — LIDOCAINE HYDROCHLORIDE 100 MG: 20 INJECTION, SOLUTION EPIDURAL; INFILTRATION; INTRACAUDAL; PERINEURAL at 11:49

## 2025-05-05 RX ADMIN — FENTANYL CITRATE 25 MCG: 0.05 INJECTION, SOLUTION INTRAMUSCULAR; INTRAVENOUS at 14:07

## 2025-05-05 RX ADMIN — Medication 100 MCG: at 12:07

## 2025-05-05 ASSESSMENT — PAIN DESCRIPTION - ORIENTATION
ORIENTATION: RIGHT;OUTER
ORIENTATION: RIGHT
ORIENTATION: RIGHT;OUTER

## 2025-05-05 ASSESSMENT — PAIN DESCRIPTION - LOCATION
LOCATION: JAW

## 2025-05-05 ASSESSMENT — PAIN SCALES - GENERAL
PAINLEVEL_OUTOF10: 4
PAINLEVEL_OUTOF10: 0
PAINLEVEL_OUTOF10: 6
PAINLEVEL_OUTOF10: 5

## 2025-05-05 ASSESSMENT — PAIN DESCRIPTION - DESCRIPTORS
DESCRIPTORS: SHARP
DESCRIPTORS: SORE
DESCRIPTORS: SHARP

## 2025-05-05 ASSESSMENT — PAIN DESCRIPTION - PAIN TYPE: TYPE: ACUTE PAIN

## 2025-05-05 ASSESSMENT — PAIN - FUNCTIONAL ASSESSMENT
PAIN_FUNCTIONAL_ASSESSMENT: 0-10
PAIN_FUNCTIONAL_ASSESSMENT: ACTIVITIES ARE NOT PREVENTED

## 2025-05-05 NOTE — ANESTHESIA POSTPROCEDURE EVALUATION
Department of Anesthesiology  Postprocedure Note    Patient: Teodoro Nava  MRN: [id number]  YOB: 1965  Date of evaluation: 5/5/2025    Procedure Summary       Date: 05/05/25 Room / Location: [facility] 02 / [facility] CARDIAC SURGERY    Anesthesia Start: 1146 Anesthesia Stop: 1354    Procedure: LIGATION OF LEFT ARM ARTERIOVENOUS FISTULA; CREATION OF LEFT ARM ARTERIOVENOUS GRAFT; PLACEMENT OF PERMANENT CATHETER; C-ARM (Left) Diagnosis:       ESRD (end stage renal disease) (HCC)      (ESRD (end stage renal disease) (HCC) [N18.6])    Surgeons: Alfonso Estevez MD Responsible Provider: Kenyon Batista Jr., MD    Anesthesia Type: General ASA Status: 3            Anesthesia Type: General    Purnima Phase I: Purnima Score: 10    Purnima Phase II:      Anesthesia Post Evaluation    Patient location during evaluation: PACU  Patient participation: complete - patient participated  Level of consciousness: awake and alert  Pain score: 3  Airway patency: patent  Nausea & Vomiting: no nausea and no vomiting  Cardiovascular status: hemodynamically stable  Respiratory status: acceptable  Hydration status: euvolemic  Multimodal analgesia pain management approach  Pain management: adequate    No notable events documented.

## 2025-05-05 NOTE — OP NOTE
Operative Note      Patient: Teodoro Nava  YOB: 1965  MRN: [id number]    Date of Procedure: 5/5/2025    Pre-Op Diagnosis Codes:      * ESRD (end stage renal disease) ([facility]) [N18.6]    Post-Op Diagnosis: Same       Procedure(s):  LIGATION OF LEFT ARM ARTERIOVENOUS FISTULA; CREATION OF LEFT ARM ARTERIOVENOUS GRAFT; PLACEMENT OF PERMANENT CATHETER; C-ARM    Surgeon(s):  Alfonso Estevez MD    Assistant:   Surgical Assistant: Amanda Kim    Anesthesia: Monitor Anesthesia Care    Estimated Blood Loss (mL): Minimal    Complications: None    Specimens:   * No specimens in log *    Implants:  Implant Name Type Inv. Item Serial No.  Lot No. LRB No. Used Action   GRAFT VASC L45CM DIA4-7MM PTFE CBAS HEP SURF STD WALLED - [id number] Vascular grafts GRAFT VASC L45CM DIA4-7MM PTFE CBAS HEP SURF STD WALLED [id number] [employer] N/A Left 1 Implanted         Drains: * No LDAs found *    Findings:  Infection Present At Time Of Surgery (PATOS) (choose all levels that have infection present):  No infection present  Other Findings: Aneurysmal preulcerative left arm fistula    Detailed Description of Procedure:   59-year-old male with this enlarging now aneurysmal fistula in the left arm is developing a preulcerative spot that is not fully open yet.  Concern is for rupture.  Plan today is to revise this to a graft.  He had general anesthesia appropriate antibiotics first his neck and chest were prepped draped you standard fashion I did ultrasound the right internal jugular vein it was compressible soft and patent.  I recorded the picture for the chart at the evaluated the left IJ is very small in size possibly occlusive.  Recorded this picture as well.  I punctured the right IJ under direct visualization of ultrasound placed a wire into the central position seen with fluoroscopy.  Made a small incision here and 1 below the clavicle with tunneled the catheter from below the

## 2025-05-05 NOTE — DISCHARGE INSTRUCTIONS
smoking/ No tobacco products/ Avoid exposure to second hand smoke  Surgeon General's Warning:  Quitting smoking now greatly reduces serious risk to your health.    Obesity, smoking, and sedentary lifestyle greatly increases your risk for illness    A healthy diet, regular physical exercise & weight monitoring are important for maintaining a healthy lifestyle    You may be retaining fluid if you have a history of heart failure or if you experience any of the following symptoms:  Weight gain of 3 pounds or more overnight or 5 pounds in a week, increased swelling in our hands or feet or shortness of breath while lying flat in bed.  Please call your doctor as soon as you notice any of these symptoms; do not wait until your next office visit.        The discharge information has been reviewed with the patient and sister.  The patient and sister verbalized understanding.  Discharge medications reviewed with the patient and sister and appropriate educational materials and side effects teaching were provided.  ___________________________________________________________________________________________________________________________________

## 2025-05-05 NOTE — PROGRESS NOTES
Dr. Santana came down to Phase II to assess the patient's swelling that he had near the right side of his face. On assessment, the patient was able to swallow, open and close his mouth, and turn his head. Dr. Santana cleared the patient to go home and instructed the patient to go to the ED if the swelling increases.

## 2025-05-05 NOTE — PERIOP NOTE
1350- Pt presented to PACU with swelling to right jaw/ear area, about the size of a large lemon.  Pt's HOB elevated to High Fowlers (80-90 degrees).  Dr. Estevez aware, will come to pt's bedside in PACU to assess patient.    1351- Dr. Batista from anesthesia at bedside to assess pt, per CRNA's request.  Per Dr. Batista, have Dr. Estevez assess pt before discharge.    1353- Dr. Estevez in PACU at pt's bedside, per Dr. Estevez continue to keep patient elevated and Ice the area.    1440- Swelling to right ear/jaw has improved with slight swelling remaining similar to left ear/jaw.  Pt reports having problems with this x 2 weeks.  Pt reports not being seen/treated for ear/jaw swelling/pain, pt denies reporting ear/jaw pain prior to today's procedure.  Pt inquired if he should be seen for his ear/jaw pain.  Pt informed to reach out to PCP or seek treatment from urgent care.  Informed patient I would let Dr. Estevez know as well.    1452- Dr. Estevez informed of above.  Per Dr. Estevez, please have Dr. Santana with ENT assess/evaluate patient prior to discharge.  Dr. Estevez aware pt is now in Phase 2 for discharge, Dr. Estevez aware wants Dr. Santana to see pt before they leave.    1451- Gayle RN in Phase 2 aware of pt needing to be assessed/evaluated by Dr. Santana prior to discharge.

## 2025-05-05 NOTE — H&P
Surgery History and Physical    Subjective:      Teodoro Nava is a 59 y.o.  male who presents with enlarging aneurysmal fistula left arm, refer to outpatient history and physical.  No changes since then.    Patient Active Problem List    Diagnosis Date Noted    ESRD on hemodialysis ([facility]) 05/15/2023    Influenza 01/15/2025    PAD (peripheral artery disease) 06/2024    Erectile dysfunction 08/10/2017    Overweight (BMI 25.0-29.9) 05/26/2016    IFG (impaired fasting glucose) 11/23/2015    Colon adenoma 11/17/2015    LALITO on CPAP 05/14/2015    Primary hypertension 05/07/2015    Dyslipidemia 12/16/2011     Past Medical History:   Diagnosis Date    Allergic rhinitis, seasonal     Anemia in chronic renal disease 2023    Dr MARCELLA Ortiz    Bipolar disorder ([facility])     Dr. Arceo since 1984    Chronic hyponatremia     Colon adenoma     and diverticulosis 7/15 Dr John AVINA-19 virus infection 09/2021    Degenerative arthritis of lumbar spine 01/2025    mri SOH showed Multilevel degenerative disc and degenerative facet disease superimposed upon a developmentally small spinal canal. Areas of central canal stenosis are mild.   Multilevel foraminal narrowing is present and appears to be of mild-to-moderate severity.    Degenerative arthritis of spine 01/2016    on t and l spine films SARTHAK    Dyslipidemia     calculated 10 yr risk score was 5.9% (4/14); 5.4% (11/14); 4.3% (5/15); 6.7% (5/16); 7.3% (11/16)    ED (erectile dysfunction) 08/10/2017    ESRD (end stage renal disease) ([facility]) 2007    CKD (2007) Dr. Sena; proteinuria (2011); Dr Garcia (2021) bx FSGS; HD T/T/S (2024) on transplant list    ESRD on hemodialysis ([facility]) 08/2023    Dr Garcia, Tuesday, Thursday and Saturday [facility]  [phone]    H/O cardiovascular stress test     ETT neg (2004); ETT neg (2009); NST (11/23) low risk, ef 64%, no tid    H/O echocardiogram     (11/23) ef 51%, LVH, RAYNE, nl valves, RVSP 20-25    Hypertension     IFG (impaired

## 2025-05-05 NOTE — PERIOP NOTE
Patient /Family /Designee has been informed that [facility] is not responsible for patient belongings per policy and the signed [facility] Patient Agreement document.  Personal items should be sent home or checked in with security.  Patient /Family /Designee selected the following action:                            [x]  Send personal items home with a family member or friend                                                 []  Check in personal items with security, excluding clothing                            []  Maintain personal items at the bedside, against recommendation                                 by [facility]                                   ** If patient /family /designee chooses to maintain personal items at the bedside,                                      Complete the patient belongings inventory in the EMR.

## 2025-05-06 ENCOUNTER — CARE COORDINATION (OUTPATIENT)
Dept: OTHER | Facility: CLINIC | Age: 60
End: 2025-05-06

## 2025-05-06 NOTE — CARE COORDINATION
Ambulatory Care Coordination Note     2025 1:56 PM     Patient Current Location:  [place]     This patient was received as a referral from Population health report .    ACM contacted the patient by telephone. Verified name and  with patient as identifiers. Provided introduction to self, and explanation of the ACM role.   Patient declined care management services at this time.          ACM: ARLETTE HARGROVE RN     Challenges to be reviewed by the provider   Additional needs identified to be addressed with provider No  none               Method of communication with provider: none.    Utilization: Initial Call - N/A    Care Summary Note: Initial outreach to patient to offer CM services due to recent out patient procedure for left AV graft. Patient stated that he is doing well and has not questions or concerns regarding procedure. Patient continues to have dialysis 3 times per week and attended today. Patient stated that his sister helps him if there is anything he needs. Patient declined further CM outreach at this time.        PCP/Specialist follow up:   Future Appointments         Provider Specialty Dept Phone    2025 8:00 AM [facility] LAB VISIT Internal Medicine [phone]    2025 8:00 AM Zi Alcala MD Internal Medicine [phone]    10/24/2025 8:00 AM Ariana Calvert MD Neurology [phone]            Follow Up:   No further Ambulatory Care Management follow-up scheduled at this time.  Patient  has Ambulatory Care Manager's contact information for any further questions, concerns or needs.       Arlette Hargrove -[phone]    Statement Selected

## 2025-05-27 ENCOUNTER — TRANSCRIBE ORDERS (OUTPATIENT)
Facility: HOSPITAL | Age: 60
End: 2025-05-27

## 2025-05-27 DIAGNOSIS — K11.1 HYPERTROPHY OF SALIVARY GLAND: Primary | ICD-10-CM

## 2025-05-30 ENCOUNTER — HOSPITAL ENCOUNTER (OUTPATIENT)
Facility: HOSPITAL | Age: 60
Discharge: HOME | End: 2025-05-30
Payer: MEDICARE

## 2025-05-30 DIAGNOSIS — K11.1 HYPERTROPHY OF SALIVARY GLAND: ICD-10-CM

## 2025-05-30 PROCEDURE — 76536 US EXAM OF HEAD AND NECK: CPT

## 2025-06-06 ENCOUNTER — HOSPITAL ENCOUNTER (OUTPATIENT)
Facility: HOSPITAL | Age: 60
Setting detail: SPECIMEN
Discharge: HOME | End: 2025-06-09
Payer: COMMERCIAL

## 2025-06-06 DIAGNOSIS — Z00.00 PHYSICAL EXAM: ICD-10-CM

## 2025-06-06 LAB
ALBUMIN SERPL-MCNC: 4 G/DL (ref 3.4–5)
ALBUMIN/GLOB SERPL: 1.6 (ref 0.8–1.7)
ALP SERPL-CCNC: 231 U/L (ref 45–117)
ALT SERPL-CCNC: 13 U/L (ref 10–50)
ANION GAP SERPL CALC-SCNC: 16 MMOL/L (ref 3–18)
AST SERPL-CCNC: 19 U/L (ref 10–38)
BASOPHILS # BLD: 0.03 K/UL (ref 0–0.1)
BASOPHILS NFR BLD: 0.6 % (ref 0–2)
BILIRUB SERPL-MCNC: 0.4 MG/DL (ref 0.2–1)
BUN SERPL-MCNC: 29 MG/DL (ref 6–23)
BUN/CREAT SERPL: 5 (ref 12–20)
CALCIUM SERPL-MCNC: 9.7 MG/DL (ref 8.5–10.1)
CHLORIDE SERPL-SCNC: 90 MMOL/L (ref 98–107)
CHOLEST SERPL-MCNC: 149 MG/DL
CO2 SERPL-SCNC: 27 MMOL/L (ref 21–32)
CREAT SERPL-MCNC: 6.14 MG/DL (ref 0.6–1.3)
DIFFERENTIAL METHOD BLD: ABNORMAL
EOSINOPHIL # BLD: 0.17 K/UL (ref 0–0.4)
EOSINOPHIL NFR BLD: 3.4 % (ref 0–5)
ERYTHROCYTE [DISTWIDTH] IN BLOOD BY AUTOMATED COUNT: 16.8 % (ref 11.6–14.5)
GLOBULIN SER CALC-MCNC: 2.5 G/DL (ref 2–4)
GLUCOSE SERPL-MCNC: 134 MG/DL (ref 74–108)
HBA1C MFR BLD: 4.9 % (ref 4.2–5.6)
HCT VFR BLD AUTO: 34.4 % (ref 36–48)
HDLC SERPL-MCNC: 45 MG/DL (ref 40–60)
HDLC SERPL: 3.3 (ref 0–5)
HGB BLD-MCNC: 11.1 G/DL (ref 13–16)
IMM GRANULOCYTES # BLD AUTO: 0.01 K/UL (ref 0–0.04)
IMM GRANULOCYTES NFR BLD AUTO: 0.2 % (ref 0–0.5)
LDLC SERPL CALC-MCNC: 69 MG/DL (ref 0–100)
LYMPHOCYTES # BLD: 0.84 K/UL (ref 0.9–3.6)
LYMPHOCYTES NFR BLD: 16.9 % (ref 21–52)
MCH RBC QN AUTO: 30.3 PG (ref 24–34)
MCHC RBC AUTO-ENTMCNC: 32.3 G/DL (ref 31–37)
MCV RBC AUTO: 94 FL (ref 78–100)
MONOCYTES # BLD: 0.44 K/UL (ref 0.05–1.2)
MONOCYTES NFR BLD: 8.9 % (ref 3–10)
NEUTS SEG # BLD: 3.48 K/UL (ref 1.8–8)
NEUTS SEG NFR BLD: 70 % (ref 40–73)
NRBC # BLD: 0 K/UL (ref 0–0.01)
NRBC BLD-RTO: 0 PER 100 WBC
PLATELET # BLD AUTO: 185 K/UL (ref 135–420)
PMV BLD AUTO: 10 FL (ref 9.2–11.8)
POTASSIUM SERPL-SCNC: 5.1 MMOL/L (ref 3.5–5.5)
PROT SERPL-MCNC: 6.4 G/DL (ref 6.4–8.2)
PSA SERPL-MCNC: 0.3 NG/ML (ref 1.4–4.4)
RBC # BLD AUTO: 3.66 M/UL (ref 4.35–5.65)
SODIUM SERPL-SCNC: 134 MMOL/L (ref 136–145)
TRIGL SERPL-MCNC: 177 MG/DL (ref 0–150)
VLDLC SERPL CALC-MCNC: 35 MG/DL
WBC # BLD AUTO: 5 K/UL (ref 4.6–13.2)

## 2025-06-06 PROCEDURE — 80061 LIPID PANEL: CPT

## 2025-06-06 PROCEDURE — 85025 COMPLETE CBC W/AUTO DIFF WBC: CPT

## 2025-06-06 PROCEDURE — 36415 COLL VENOUS BLD VENIPUNCTURE: CPT

## 2025-06-06 PROCEDURE — 83036 HEMOGLOBIN GLYCOSYLATED A1C: CPT

## 2025-06-06 PROCEDURE — 80053 COMPREHEN METABOLIC PANEL: CPT

## 2025-06-06 PROCEDURE — G0103 PSA SCREENING: HCPCS

## 2025-06-13 ENCOUNTER — OFFICE VISIT (OUTPATIENT)
Facility: CLINIC | Age: 60
End: 2025-06-13

## 2025-06-13 ENCOUNTER — TELEPHONE (OUTPATIENT)
Facility: CLINIC | Age: 60
End: 2025-06-13

## 2025-06-13 VITALS
BODY MASS INDEX: 32.28 KG/M2 | TEMPERATURE: 98.2 F | HEIGHT: 68 IN | HEART RATE: 55 BPM | OXYGEN SATURATION: 97 % | DIASTOLIC BLOOD PRESSURE: 75 MMHG | SYSTOLIC BLOOD PRESSURE: 124 MMHG | WEIGHT: 213 LBS | RESPIRATION RATE: 16 BRPM

## 2025-06-13 DIAGNOSIS — I10 PRIMARY HYPERTENSION: ICD-10-CM

## 2025-06-13 DIAGNOSIS — E66.3 OVERWEIGHT (BMI 25.0-29.9): ICD-10-CM

## 2025-06-13 DIAGNOSIS — R74.01 TRANSAMINASEMIA: ICD-10-CM

## 2025-06-13 DIAGNOSIS — N18.6 ESRD ON HEMODIALYSIS (HCC): ICD-10-CM

## 2025-06-13 DIAGNOSIS — R73.01 IFG (IMPAIRED FASTING GLUCOSE): ICD-10-CM

## 2025-06-13 DIAGNOSIS — E78.5 DYSLIPIDEMIA: ICD-10-CM

## 2025-06-13 DIAGNOSIS — Z71.89 ADVANCED CARE PLANNING/COUNSELING DISCUSSION: ICD-10-CM

## 2025-06-13 DIAGNOSIS — I73.9 PAD (PERIPHERAL ARTERY DISEASE): ICD-10-CM

## 2025-06-13 DIAGNOSIS — Z00.00 INITIAL MEDICARE ANNUAL WELLNESS VISIT: Primary | ICD-10-CM

## 2025-06-13 DIAGNOSIS — Z99.2 ESRD ON HEMODIALYSIS (HCC): ICD-10-CM

## 2025-06-13 DIAGNOSIS — G47.33 OSA ON CPAP: ICD-10-CM

## 2025-06-13 DIAGNOSIS — R74.8 ELEVATED ALKALINE PHOSPHATASE LEVEL: ICD-10-CM

## 2025-06-13 PROBLEM — J11.1 INFLUENZA: Status: RESOLVED | Noted: 2025-01-15 | Resolved: 2025-06-13

## 2025-06-13 RX ORDER — TENAPANOR 31.9 MG/1
30 TABLET, FILM COATED ORAL 2 TIMES DAILY
COMMUNITY
Start: 2025-05-28

## 2025-06-13 RX ORDER — CEPHALEXIN 500 MG/1
500 CAPSULE ORAL 2 TIMES DAILY
COMMUNITY
Start: 2025-06-06 | End: 2025-06-15

## 2025-06-13 ASSESSMENT — PATIENT HEALTH QUESTIONNAIRE - PHQ9
6. FEELING BAD ABOUT YOURSELF - OR THAT YOU ARE A FAILURE OR HAVE LET YOURSELF OR YOUR FAMILY DOWN: NOT AT ALL
7. TROUBLE CONCENTRATING ON THINGS, SUCH AS READING THE NEWSPAPER OR WATCHING TELEVISION: NOT AT ALL
SUM OF ALL RESPONSES TO PHQ QUESTIONS 1-9: 0
5. POOR APPETITE OR OVEREATING: NOT AT ALL
8. MOVING OR SPEAKING SO SLOWLY THAT OTHER PEOPLE COULD HAVE NOTICED. OR THE OPPOSITE, BEING SO FIGETY OR RESTLESS THAT YOU HAVE BEEN MOVING AROUND A LOT MORE THAN USUAL: NOT AT ALL
1. LITTLE INTEREST OR PLEASURE IN DOING THINGS: NOT AT ALL
SUM OF ALL RESPONSES TO PHQ QUESTIONS 1-9: 0
3. TROUBLE FALLING OR STAYING ASLEEP: NOT AT ALL
4. FEELING TIRED OR HAVING LITTLE ENERGY: NOT AT ALL
9. THOUGHTS THAT YOU WOULD BE BETTER OFF DEAD, OR OF HURTING YOURSELF: NOT AT ALL
SUM OF ALL RESPONSES TO PHQ QUESTIONS 1-9: 0
10. IF YOU CHECKED OFF ANY PROBLEMS, HOW DIFFICULT HAVE THESE PROBLEMS MADE IT FOR YOU TO DO YOUR WORK, TAKE CARE OF THINGS AT HOME, OR GET ALONG WITH OTHER PEOPLE: NOT DIFFICULT AT ALL
SUM OF ALL RESPONSES TO PHQ QUESTIONS 1-9: 0
2. FEELING DOWN, DEPRESSED OR HOPELESS: NOT AT ALL

## 2025-06-13 NOTE — PROGRESS NOTES
Medicare Annual Wellness Visit    Teodoro Nava is here for Annual Exam and Medicare AWV    Assessment & Plan   Elevated alkaline phosphatase level  -     Antinuclear AB Screen IFA; Future  -     Celiac Disease Panel; Future  -     Ceruloplasmin; Future  -     Mitochondrial antibodies, M2; Future  -     US ABDOMEN COMPLETE; Future  Transaminasemia  -     Antinuclear AB Screen IFA; Future  -     Celiac Disease Panel; Future  -     Ceruloplasmin; Future  -     Mitochondrial antibodies, M2; Future  -     US ABDOMEN COMPLETE; Future  PAD (peripheral artery disease)  Primary hypertension  LALITO on CPAP  IFG (impaired fasting glucose)  ESRD on hemodialysis (HCC)  Dyslipidemia  Overweight (BMI 25.0-29.9)  Advanced care planning/counseling discussion  Initial Medicare annual wellness visit       Return in 1 year (on 6/13/2026) for Medicare AWV.     Subjective       Patient's complete Health Risk Assessment and screening values have been reviewed and are found in Flowsheets. The following problems were reviewed today and where indicated follow up appointments were made and/or referrals ordered.    Positive Risk Factor Screenings with Interventions:                Abnormal BMI (obese):  Body mass index is 32.39 kg/m². (!) Abnormal  Interventions:  Patient declines any further evaluation or treatment                 Pt is not sexually active          Objective   Vitals:    06/13/25 0805   BP: 124/75   Pulse: 55   Resp: 16   Temp: 98.2 °F (36.8 °C)   TempSrc: Temporal   SpO2: 97%   Weight: 96.6 kg (213 lb)   Height: 1.727 m (5' 8\")      Body mass index is 32.39 kg/m².                    Allergies   Allergen Reactions    Gabapentin Other (See Comments)     Balance issues      Prior to Visit Medications    Medication Sig Taking? Authorizing Provider   XPHOZAH 30 MG TABS Take 30 mg by mouth 2 times daily Yes Provider, MD [place]   cephALEXin (KEFLEX) 500 MG capsule Take 1 capsule by mouth 2 times daily Yes Provider, 
Teodoro Nava presents today for   Chief Complaint   Patient presents with    Annual Exam       \"Have you been to the ER, urgent care clinic since your last visit?  Hospitalized since your last visit?\"    YES - When: approximately 4 months ago.  Where and Why: [facility]: fall.    “Have you seen or consulted any other health care providers outside of [facility] since your last visit?”    YES - When: approximately 3  weeks ago.  Where and Why: [facility]: new visit, enlarged parotid gland.           
hypertension    IFG (impaired fasting glucose)    Overweight (BMI 25.0-29.9)    ESRD on hemodialysis (HCC)    PAD (peripheral artery disease)         Assessment and plan:  1. BPD.  Continue current and f/u Dr. Shore  2. ESRD on HD, on transplant list.  Follow up Dr Luciano  3. Dyslipidemia.  At target on lipitor  4. HTN. Continue current   5. Obesity.  Continue lifestyle and dietary measures, portion control. Cut back on processed foods and he will inc walking/exercise  6. IFG.  Continue to follow, wt loss as above  7. LALITO on cpap.  F/U Dr Newman  8. Colon adenoma.  Fiber, colo 2027  9. AOCD.  Epo  10.  PAD.  Per Dr Estevez  11.  [id number] advocated finish out shingrix2  12.  Elevated alk phos.  Quick discussion; check labs below.  He had elastography 11/23 which came back F0-1        RTC 6/26    Above conditions discussed at length and patient vocalized understanding.  All questions answered to patient satisfaction       Diagnosis Orders   1. Elevated alkaline phosphatase level  Antinuclear AB Screen IFA    Celiac Disease Panel    Ceruloplasmin    Mitochondrial antibodies, M2    US ABDOMEN COMPLETE      2. Transaminasemia  Antinuclear AB Screen IFA    Celiac Disease Panel    Ceruloplasmin    Mitochondrial antibodies, M2    US ABDOMEN COMPLETE      3. PAD (peripheral artery disease)        4. Primary hypertension        5. LALITO on CPAP        6. IFG (impaired fasting glucose)        7. ESRD on hemodialysis (HCC)        8. Dyslipidemia        9. Overweight (BMI 25.0-29.9)        10. Advanced care planning/counseling discussion

## 2025-06-13 NOTE — TELEPHONE ENCOUNTER
----- Message from Dr. Zi Alcala MD sent at 6/13/2025 11:20 AM EDT -----  Pls call    Did he ever see Dr Alfonso Estevez/[facility] for the abn circulation study in the legs from 6/24?  If yes, pls get records

## 2025-06-13 NOTE — TELEPHONE ENCOUNTER
Called and spoke to patient who verified he did see Dr. Estevez and had an ultrasound and an EMG completed.    Request for office notes and imagining/testing that was completed sent to [facility] [place].     Awaiting return fax.

## 2025-06-13 NOTE — ACP (ADVANCE CARE PLANNING)
Advance Care Planning     Advance Care Planning (ACP) Physician/NP/PA Conversation    Date of Conversation: 6/13/2025  Conducted with: Patient with Decision Making Capacity    Healthcare Decision Maker:        Click here to complete Healthcare Decision Makers including selection of the Healthcare Decision Maker Relationship (ie \"Primary\")  Today we documented Decision Maker(s) consistent with Legal Next of Kin hierarchy.    Care Preferences:    Hospitalization:  \"If your health worsens and it becomes clear that your chance of recovery is unlikely, what would be your preference regarding hospitalization?\"  The patient would prefer hospitalization.    Ventilation:  \"If you were unable to breath on your own and your chance of recovery was unlikely, what would be your preference about the use of a ventilator (breathing machine) if it was available to you?\"  The patient would desire the use of a ventilator.    Resuscitation:  \"In the event your heart stopped as a result of an underlying serious health condition, would you want attempts made to restart your heart, or would you prefer a natural death?\"  Yes, attempt to resuscitate.    ventilation preferences, hospitalization preferences, and resuscitation preferences    Conversation Outcomes / Follow-Up Plan:  ACP in process - information provided, considering goals and options  Reviewed DNR/DNI and patient elects Full Code (Attempt Resuscitation)    Length of Voluntary ACP Conversation in minutes:  16 minutes    ARCENIO GRADY MD

## 2025-06-20 ENCOUNTER — HOSPITAL ENCOUNTER (OUTPATIENT)
Facility: HOSPITAL | Age: 60
Discharge: HOME | End: 2025-06-23
Payer: MEDICARE

## 2025-06-20 DIAGNOSIS — R74.01 TRANSAMINASEMIA: ICD-10-CM

## 2025-06-20 DIAGNOSIS — R74.8 ELEVATED ALKALINE PHOSPHATASE LEVEL: ICD-10-CM

## 2025-06-20 PROCEDURE — 76700 US EXAM ABDOM COMPLETE: CPT

## 2025-07-02 ENCOUNTER — OFFICE VISIT (OUTPATIENT)
Facility: CLINIC | Age: 60
End: 2025-07-02

## 2025-07-02 ENCOUNTER — HOSPITAL ENCOUNTER (OUTPATIENT)
Age: 60
Discharge: HOME | End: 2025-07-02
Payer: COMMERCIAL

## 2025-07-02 VITALS
RESPIRATION RATE: 16 BRPM | SYSTOLIC BLOOD PRESSURE: 119 MMHG | DIASTOLIC BLOOD PRESSURE: 68 MMHG | BODY MASS INDEX: 32.43 KG/M2 | HEIGHT: 68 IN | HEART RATE: 58 BPM | OXYGEN SATURATION: 98 % | TEMPERATURE: 98.2 F | WEIGHT: 214 LBS

## 2025-07-02 DIAGNOSIS — R74.8 ELEVATED ALKALINE PHOSPHATASE LEVEL: Primary | ICD-10-CM

## 2025-07-02 DIAGNOSIS — R74.01 TRANSAMINASEMIA: ICD-10-CM

## 2025-07-02 DIAGNOSIS — R74.8 ELEVATED ALKALINE PHOSPHATASE LEVEL: ICD-10-CM

## 2025-07-02 PROCEDURE — 82390 ASSAY OF CERULOPLASMIN: CPT

## 2025-07-02 PROCEDURE — 36415 COLL VENOUS BLD VENIPUNCTURE: CPT

## 2025-07-02 PROCEDURE — 86038 ANTINUCLEAR ANTIBODIES: CPT

## 2025-07-02 PROCEDURE — 86231 EMA EACH IG CLASS: CPT

## 2025-07-02 PROCEDURE — 86381 MITOCHONDRIAL ANTIBODY EACH: CPT

## 2025-07-02 PROCEDURE — 82784 ASSAY IGA/IGD/IGG/IGM EACH: CPT

## 2025-07-02 PROCEDURE — 86364 TISS TRNSGLTMNASE EA IG CLAS: CPT

## 2025-07-02 NOTE — PROGRESS NOTES
Teodoro Nava presents today for   Chief Complaint   Patient presents with    DISCUSS IMAGING/Ultrasound       \"Have you been to the ER, urgent care clinic since your last visit?  Hospitalized since your last visit?\"    NO    “Have you seen or consulted any other health care providers outside of [facility] since your last visit?”    YES - When: approximately 1 months ago.  Where and Why: [facility].

## 2025-07-02 NOTE — PROGRESS NOTES
59 y.o. male who presents for evaluation.    Here to follow-up on the results of the ultrasound that we just did.  Previously, he was noted to have elevated alk phos.  We have prior CT from 2024 which showed possible gallstones and liver cyst but otherwise negative, no mention of steatosis.  He also had US elastography 1/24 ordered by nephrology which came back at F0-1.  Remains completely asymptomatic with no postprandial symptoms of abdominal pain, nausea, vomiting, weight loss, jaundice.  He was supposed to get serologies but has not done so yet.    Past Medical History:   Diagnosis Date    Allergic rhinitis, seasonal     Anemia in chronic renal disease 2023    Dr MARCELLA Ortiz    Bipolar disorder ([facility])     Dr. Arceo since 1984    Cholelithiases 06/2025    on CT 12/23 and US 6/25    Chronic hyponatremia     Colon adenoma     and diverticulosis 7/15 Dr John AVINA-19 virus infection 09/2021    Degenerative arthritis of lumbar spine 01/2025    mri SOH showed Multilevel degenerative disc and degenerative facet disease superimposed upon a developmentally small spinal canal. Areas of central canal stenosis are mild.   Multilevel foraminal narrowing is present and appears to be of mild-to-moderate severity.    Degenerative arthritis of spine 01/2016    on t and l spine films SARTHAK    Dyslipidemia     calculated 10 yr risk score was 5.9% (4/14); 5.4% (11/14); 4.3% (5/15); 6.7% (5/16); 7.3% (11/16)    ED (erectile dysfunction) 08/10/2017    ESRD (end stage renal disease) ([facility]) 2007    CKD (2007) Dr. Sena; proteinuria (2011); Dr Garcia (2021) bx FSGS; HD T/T/S (2024) on transplant list    ESRD on hemodialysis ([facility]) 08/2023    Dr Garcia, Tuesday, Thursday and Saturday [facility]  [phone]    H/O cardiovascular stress test     ETT neg (2004); ETT neg (2009); NST (11/23) low risk, ef 64%, no tid    H/O echocardiogram     (11/23) ef 51%, LVH, RAYNE, nl valves, RVSP 20-25    Hypertension     IFG (impaired fasting glucose)

## 2025-07-05 LAB
ANA TITR SER IF: NEGATIVE
CERULOPLASMIN SERPL-MCNC: 20.8 MG/DL (ref 16–31)

## 2025-07-06 LAB
ENDOMYSIUM IGA SER QL: NORMAL
IGA SERPL-MCNC: 141 MG/DL (ref 90–386)
MITOCHONDRIA M2 IGG SER-ACNC: <20 UNITS (ref 0–20)
TTG IGA SER-ACNC: <2 U/ML (ref 0–3)

## 2025-07-08 LAB
ENDOMYSIUM IGA SER QL: NEGATIVE
IGA SERPL-MCNC: 141 MG/DL (ref 90–386)
TTG IGA SER-ACNC: <2 U/ML (ref 0–3)

## 2025-07-15 ENCOUNTER — HOSPITAL ENCOUNTER (OUTPATIENT)
Age: 60
Discharge: HOME | End: 2025-07-18
Payer: MEDICARE

## 2025-07-15 DIAGNOSIS — K11.1 HYPERTROPHY OF SALIVARY GLAND: ICD-10-CM

## 2025-07-15 PROCEDURE — 6360000004 HC RX CONTRAST MEDICATION: Performed by: OTOLARYNGOLOGY

## 2025-07-15 PROCEDURE — 70491 CT SOFT TISSUE NECK W/DYE: CPT

## 2025-07-15 RX ORDER — IOPAMIDOL 612 MG/ML
100 INJECTION, SOLUTION INTRAVASCULAR
Status: COMPLETED | OUTPATIENT
Start: 2025-07-15 | End: 2025-07-15

## 2025-07-15 RX ORDER — IOPAMIDOL 612 MG/ML
100 INJECTION, SOLUTION INTRAVASCULAR
Status: DISCONTINUED | OUTPATIENT
Start: 2025-07-15 | End: 2025-07-15

## 2025-07-15 RX ADMIN — IOPAMIDOL 100 ML: 612 INJECTION, SOLUTION INTRAVENOUS at 19:53

## 2025-07-23 RX ORDER — ATORVASTATIN CALCIUM 40 MG/1
40 TABLET, FILM COATED ORAL DAILY
Qty: 90 TABLET | Refills: 3 | Status: SHIPPED | OUTPATIENT
Start: 2025-07-23

## 2025-08-29 ENCOUNTER — TRANSCRIBE ORDERS (OUTPATIENT)
Facility: HOSPITAL | Age: 60
End: 2025-08-29

## 2025-08-29 DIAGNOSIS — D37.030 NEOPLASM OF UNCERTAIN BEHAVIOR OF PAROTID SALIVARY GLAND: Primary | ICD-10-CM

## (undated) DEVICE — CATHETER SUCT TR FL TIP 14FR W/ O CTRL

## (undated) DEVICE — GAUZE,SPONGE,4"X4",16PLY,STRL,LF,10/TRAY: Brand: MEDLINE

## (undated) DEVICE — KIT HAD L40CM CATH SYMMETRIC TIP 2 LUMN CATH SFTY SHTH TISS

## (undated) DEVICE — APPLIER CLP L9.38IN M LIG TI DISP STR RNG HNDL LIGACLP

## (undated) DEVICE — SUTURE PERMA-HAND SZ 3-0 L24IN NONABSORBABLE BLK W/O NDL SA74H

## (undated) DEVICE — SUTURE PROL 5 0 L36IN NONABSORBABLE C 1 DBL ARMED MFIL

## (undated) DEVICE — BLADE ES L2.75IN ELASTOMERIC COAT DURABLE BEND UPTO 90DEG

## (undated) DEVICE — Device

## (undated) DEVICE — ELECTRODE PT RET AD L9FT HI MOIST COND ADH HYDRGEL CORDED

## (undated) DEVICE — SYSTEM INF CTRL

## (undated) DEVICE — GLOVE SURG SZ 7.5 L11.73IN FNGR THK9.8MIL STRW LTX POLYMER

## (undated) DEVICE — INTRODUCER SHTH 4FR CANN L11CM DIL TIP 25MM RED TUNGSTEN

## (undated) DEVICE — GLOVE SURG SZ 7 L11.33IN FNGR THK9.8MIL STRW LTX POLYMER

## (undated) DEVICE — TRAP SPEC POLYP REM STRNR CLN DSGN MAGNIFYING WIND DISP

## (undated) DEVICE — SUTURE PERMAHAND SZ 2-0 L30IN NONABSORBABLE BLK SILK W/O A305H

## (undated) DEVICE — SYRINGE MED 10ML LUERLOCK TIP W/O SFTY DISP

## (undated) DEVICE — DECANTER BAG 9": Brand: MEDLINE INDUSTRIES, INC.

## (undated) DEVICE — SUTURE PROL SZ 5-0 L36IN NONABSORBABLE BLU L13MM C-1 3/8 8720H

## (undated) DEVICE — SUTURE MCRYL SZ 4-0 L18IN ABSRB UD L19MM PS-2 3/8 CIR PRIM Y496G

## (undated) DEVICE — SUTURE PERMAHAND SZ 0 L30IN NONABSORBABLE BLK SILK BRAID A306H

## (undated) DEVICE — INTENDED FOR TISSUE SEPARATION, AND OTHER PROCEDURES THAT REQUIRE A SHARP SURGICAL BLADE TO PUNCTURE OR CUT.: Brand: BARD-PARKER ® STAINLESS STEEL BLADES

## (undated) DEVICE — APPLICATOR MEDICATED 26 CC SOLUTION HI LT ORNG CHLORAPREP

## (undated) DEVICE — SUTURE PERMA-HAND SZ 2-0 L24IN NONABSORBABLE BLK W/O NDL SA75H

## (undated) DEVICE — BLANKET WRM W40.2XL55.9IN IORT LO BODY + MISTRAL AIR

## (undated) DEVICE — PROBE VASC 8MHZ WTRPRF

## (undated) DEVICE — SUTURE MONOCRYL SZ 4-0 L18IN ABSRB UD L19MM PS-2 3/8 CIR PRIM Y496G

## (undated) DEVICE — SUTURE NONABSORBABLE MONOFILAMENT 6-0 C-1 1X30 IN PROLENE 8706H

## (undated) DEVICE — YANKAUER,SMOOTH HANDLE,HIGH CAPACITY: Brand: MEDLINE INDUSTRIES, INC.

## (undated) DEVICE — GOWN PLASTIC FILM THMBHKS UNIV BLUE: Brand: CARDINAL HEALTH

## (undated) DEVICE — SUTURE MONOCRYL SZ 3-0 L27IN ABSRB UD L26MM SH 1/2 CIR Y416H

## (undated) DEVICE — STRIP SKIN CLSR W1XL5IN NYLON REINF CURAD STERIL

## (undated) DEVICE — ADHESIVE SKIN CLSR 0.7ML TOP DERMBND ADV

## (undated) DEVICE — SUTURE MCRYL SZ 2-0 L36IN ABSRB UD L36MM CT-1 1/2 CIR Y945H

## (undated) DEVICE — UNDERPAD INCONT W23XL36IN STD BLU POLYPR BK FLUF SFT

## (undated) DEVICE — 48" PROBE COVER W/GEL, ULTRASOUND, STERILE: Brand: SITE-RITE

## (undated) DEVICE — LINER SUCT CANSTR 3000CC PLAS SFT PRE ASSEMB W/OUT TBNG W/

## (undated) DEVICE — NEEDLE ANGIO 1 WALL ULT SMOOTH 19GAX7CM MERIT AVANCE

## (undated) DEVICE — SUTURE MCRYL SZ 3-0 L27IN ABSRB UD L26MM SH 1/2 CIR Y416H

## (undated) DEVICE — CANNULA ORIG TL CLR W FOAM CUSHIONS AND 14FT SUPL TB 3 CHN

## (undated) DEVICE — AEGIS 1" DISK 4MM HOLE, PEEL OPEN: Brand: MEDLINE

## (undated) DEVICE — COVER US PRB W15XL120CM W/ GEL RUBBERBAND TAPE STRP FLD GEN

## (undated) DEVICE — CLEAN UP KIT: Brand: MEDLINE INDUSTRIES, INC.

## (undated) DEVICE — SUTURE MONOCRYL + ABSORBABLE MONOFILAMENT 3-0 SH 27 IN UD  MCP316H

## (undated) DEVICE — SOLUTION IRRIG 1000ML H2O STRL BLT

## (undated) DEVICE — CANNULA NSL AD TBNG L14FT STD PVC O2 CRV CONN NONFLARED NSL

## (undated) DEVICE — DECANTER FLD 9IN ST BG FOR ASEP TRNSF OF FLD

## (undated) DEVICE — BLADE SURG NO 11 CONVENTIONAL STD UNPROTECTED W/O HNDL S

## (undated) DEVICE — EVACUATOR SMOKE L 10 FT SMOKE MANAGEMENT EXTENDED EDGE ELECTRODE STERILE DISP

## (undated) DEVICE — SUTURE GOR TX SZ 5-0 L30IN NONABSORBABLE L12MM TTC-12 3/8 6M10A

## (undated) DEVICE — SYRINGE MED 25GA 3ML L5/8IN SUBQ PLAS W/ DETACH NDL SFTY

## (undated) DEVICE — SUTURE PERMAHAND SZ 4-0 L12X30IN NONABSORBABLE BLK SILK A303H

## (undated) DEVICE — SNARE VASC L240CM LOOP W10MM SHTH DIA2.4MM RND STIFF CLD

## (undated) DEVICE — MEDI-VAC NON-CONDUCTIVE SUCTION TUBING: Brand: CARDINAL HEALTH

## (undated) DEVICE — SUTURE MONOCRYL SZ 5-0 L18IN ABSRB UD L19MM PS-2 3/8 CIR PRIM Y495G

## (undated) DEVICE — SYRINGE 20ML LL S/C 50

## (undated) DEVICE — ENDOSCOPY PUMP TUBING/ CAP SET: Brand: ERBE

## (undated) DEVICE — SUTURE MONOCRYL SZ 4-0 L18IN ABSRB UD P-3 L13MM 3/8 CIR PRIM Y494G

## (undated) DEVICE — SYRINGE MED 50ML LUERSLIP TIP